# Patient Record
Sex: FEMALE | Race: WHITE | NOT HISPANIC OR LATINO | Employment: FULL TIME | URBAN - METROPOLITAN AREA
[De-identification: names, ages, dates, MRNs, and addresses within clinical notes are randomized per-mention and may not be internally consistent; named-entity substitution may affect disease eponyms.]

---

## 2018-09-25 ENCOUNTER — APPOINTMENT (EMERGENCY)
Dept: CT IMAGING | Facility: HOSPITAL | Age: 43
DRG: 439 | End: 2018-09-25
Payer: COMMERCIAL

## 2018-09-25 ENCOUNTER — HOSPITAL ENCOUNTER (INPATIENT)
Facility: HOSPITAL | Age: 43
LOS: 4 days | Discharge: HOME | DRG: 439 | End: 2018-09-29
Attending: EMERGENCY MEDICINE | Admitting: INTERNAL MEDICINE
Payer: COMMERCIAL

## 2018-09-25 DIAGNOSIS — R10.31 RLQ ABDOMINAL PAIN: ICD-10-CM

## 2018-09-25 DIAGNOSIS — K43.9 VENTRAL HERNIA: ICD-10-CM

## 2018-09-25 DIAGNOSIS — K80.20 CHOLELITHIASIS: ICD-10-CM

## 2018-09-25 DIAGNOSIS — K85.90 ACUTE PANCREATITIS: Primary | ICD-10-CM

## 2018-09-25 LAB
ALBUMIN SERPL BCP-MCNC: 3.4 G/DL (ref 3.5–5)
ALP SERPL-CCNC: 140 U/L (ref 46–116)
ALT SERPL W P-5'-P-CCNC: 71 U/L (ref 12–78)
ANION GAP SERPL CALCULATED.3IONS-SCNC: 9 MMOL/L (ref 4–13)
AST SERPL W P-5'-P-CCNC: 47 U/L (ref 5–45)
BASOPHILS # BLD AUTO: 0.06 THOUSANDS/ΜL (ref 0–0.1)
BASOPHILS NFR BLD AUTO: 1 % (ref 0–1)
BILIRUB SERPL-MCNC: 2.7 MG/DL (ref 0.2–1)
BUN SERPL-MCNC: 9 MG/DL (ref 5–25)
CALCIUM SERPL-MCNC: 11.2 MG/DL (ref 8.3–10.1)
CHLORIDE SERPL-SCNC: 95 MMOL/L (ref 100–108)
CLARITY, POC: CLEAR
CO2 SERPL-SCNC: 32 MMOL/L (ref 21–32)
COLOR, POC: NORMAL
CREAT SERPL-MCNC: 0.91 MG/DL (ref 0.6–1.3)
EOSINOPHIL # BLD AUTO: 0.04 THOUSAND/ΜL (ref 0–0.61)
EOSINOPHIL NFR BLD AUTO: 0 % (ref 0–6)
ERYTHROCYTE [DISTWIDTH] IN BLOOD BY AUTOMATED COUNT: 12.9 % (ref 11.6–15.1)
EXT BILIRUBIN, UA: NEGATIVE
EXT BLOOD URINE: NEGATIVE
EXT GLUCOSE, UA: NEGATIVE
EXT KETONES: NORMAL
EXT NITRITE, UA: NEGATIVE
EXT PH, UA: 7
EXT PREG TEST URINE: NEGATIVE
EXT PROTEIN, UA: 1
EXT SPECIFIC GRAVITY, UA: 1.01
EXT UROBILINOGEN: 1
GFR SERPL CREATININE-BSD FRML MDRD: 78 ML/MIN/1.73SQ M
GLUCOSE SERPL-MCNC: 117 MG/DL (ref 65–140)
HCT VFR BLD AUTO: 49.6 % (ref 34.8–46.1)
HGB BLD-MCNC: 16.9 G/DL (ref 11.5–15.4)
IMM GRANULOCYTES # BLD AUTO: 0.05 THOUSAND/UL (ref 0–0.2)
IMM GRANULOCYTES NFR BLD AUTO: 0 % (ref 0–2)
LIPASE SERPL-CCNC: 2856 U/L (ref 73–393)
LYMPHOCYTES # BLD AUTO: 1.18 THOUSANDS/ΜL (ref 0.6–4.47)
LYMPHOCYTES NFR BLD AUTO: 10 % (ref 14–44)
MCH RBC QN AUTO: 36 PG (ref 26.8–34.3)
MCHC RBC AUTO-ENTMCNC: 34.1 G/DL (ref 31.4–37.4)
MCV RBC AUTO: 106 FL (ref 82–98)
MONOCYTES # BLD AUTO: 0.74 THOUSAND/ΜL (ref 0.17–1.22)
MONOCYTES NFR BLD AUTO: 6 % (ref 4–12)
NEUTROPHILS # BLD AUTO: 10.35 THOUSANDS/ΜL (ref 1.85–7.62)
NEUTS SEG NFR BLD AUTO: 83 % (ref 43–75)
NRBC BLD AUTO-RTO: 0 /100 WBCS
PLATELET # BLD AUTO: 180 THOUSANDS/UL (ref 149–390)
PMV BLD AUTO: 10.3 FL (ref 8.9–12.7)
POTASSIUM SERPL-SCNC: 3.7 MMOL/L (ref 3.5–5.3)
PROT SERPL-MCNC: 8 G/DL (ref 6.4–8.2)
RBC # BLD AUTO: 4.7 MILLION/UL (ref 3.81–5.12)
SODIUM SERPL-SCNC: 136 MMOL/L (ref 136–145)
WBC # BLD AUTO: 12.42 THOUSAND/UL (ref 4.31–10.16)
WBC # BLD EST: NEGATIVE 10*3/UL

## 2018-09-25 PROCEDURE — 83690 ASSAY OF LIPASE: CPT | Performed by: PHYSICIAN ASSISTANT

## 2018-09-25 PROCEDURE — 80053 COMPREHEN METABOLIC PANEL: CPT | Performed by: PHYSICIAN ASSISTANT

## 2018-09-25 PROCEDURE — 96361 HYDRATE IV INFUSION ADD-ON: CPT

## 2018-09-25 PROCEDURE — 74176 CT ABD & PELVIS W/O CONTRAST: CPT

## 2018-09-25 PROCEDURE — 99285 EMERGENCY DEPT VISIT HI MDM: CPT

## 2018-09-25 PROCEDURE — 96376 TX/PRO/DX INJ SAME DRUG ADON: CPT

## 2018-09-25 PROCEDURE — 96374 THER/PROPH/DIAG INJ IV PUSH: CPT

## 2018-09-25 PROCEDURE — 81002 URINALYSIS NONAUTO W/O SCOPE: CPT | Performed by: PHYSICIAN ASSISTANT

## 2018-09-25 PROCEDURE — 85025 COMPLETE CBC W/AUTO DIFF WBC: CPT | Performed by: PHYSICIAN ASSISTANT

## 2018-09-25 PROCEDURE — 99223 1ST HOSP IP/OBS HIGH 75: CPT | Performed by: PHYSICIAN ASSISTANT

## 2018-09-25 PROCEDURE — 36415 COLL VENOUS BLD VENIPUNCTURE: CPT | Performed by: PHYSICIAN ASSISTANT

## 2018-09-25 PROCEDURE — 81025 URINE PREGNANCY TEST: CPT | Performed by: PHYSICIAN ASSISTANT

## 2018-09-25 RX ORDER — MORPHINE SULFATE 4 MG/ML
4 INJECTION, SOLUTION INTRAMUSCULAR; INTRAVENOUS ONCE
Status: COMPLETED | OUTPATIENT
Start: 2018-09-25 | End: 2018-09-25

## 2018-09-25 RX ORDER — MORPHINE SULFATE 2 MG/ML
2 INJECTION, SOLUTION INTRAMUSCULAR; INTRAVENOUS ONCE
Status: COMPLETED | OUTPATIENT
Start: 2018-09-25 | End: 2018-09-25

## 2018-09-25 RX ORDER — SODIUM CHLORIDE, SODIUM LACTATE, POTASSIUM CHLORIDE, CALCIUM CHLORIDE 600; 310; 30; 20 MG/100ML; MG/100ML; MG/100ML; MG/100ML
100 INJECTION, SOLUTION INTRAVENOUS CONTINUOUS
Status: DISCONTINUED | OUTPATIENT
Start: 2018-09-25 | End: 2018-09-25

## 2018-09-25 RX ORDER — SODIUM CHLORIDE, SODIUM LACTATE, POTASSIUM CHLORIDE, CALCIUM CHLORIDE 600; 310; 30; 20 MG/100ML; MG/100ML; MG/100ML; MG/100ML
100 INJECTION, SOLUTION INTRAVENOUS CONTINUOUS
Status: DISCONTINUED | OUTPATIENT
Start: 2018-09-26 | End: 2018-09-28

## 2018-09-25 RX ADMIN — SODIUM CHLORIDE, SODIUM LACTATE, POTASSIUM CHLORIDE, AND CALCIUM CHLORIDE 100 ML/HR: .6; .31; .03; .02 INJECTION, SOLUTION INTRAVENOUS at 23:37

## 2018-09-25 RX ADMIN — MORPHINE SULFATE 2 MG: 2 INJECTION, SOLUTION INTRAMUSCULAR; INTRAVENOUS at 22:22

## 2018-09-25 RX ADMIN — SODIUM CHLORIDE 1000 ML: 0.9 INJECTION, SOLUTION INTRAVENOUS at 19:07

## 2018-09-25 RX ADMIN — MORPHINE SULFATE 4 MG: 4 INJECTION, SOLUTION INTRAMUSCULAR; INTRAVENOUS at 20:29

## 2018-09-25 RX ADMIN — HYDROMORPHONE HYDROCHLORIDE 1 MG: 1 INJECTION, SOLUTION INTRAMUSCULAR; INTRAVENOUS; SUBCUTANEOUS at 23:12

## 2018-09-25 NOTE — ED PROVIDER NOTES
History  Chief Complaint   Patient presents with    Pelvic Pain     pt presents to ED c/o pelvic pain sincesunday  It started in her back and now is in her pelvic area  States it feels the same has when she had cysts rupture in the past  Rates pain 8/10     Zaira Oliveira is a 22-year-old F with history of ovarian cyst rupture in '11 and gastric bypass surgery in '02 presenting to ED with pelvic pain x2 days  Patient states that two nights ago she was awoken from sleep with sharp, stabbing lower abdominal pain  Patient states the pain has continued and worsened, describing the pain as 8/10  Patient states she feels nauseas during pain episodes, but denies vomiting  Patient states she had one episode of non-bloody diarrhea  She states she has decreased appetite and unable to tolerate po due to pain, but has been tolerating liquids  LMP was 2 weeks ago, however prior to that she had not bled in over 1 year  Patient drinks 2 drinks per week but denies tobacco or illicit drug use          History provided by:  Patient   used: No    Abdominal Pain   Pain location:  Suprapubic  Pain quality: aching, sharp and throbbing    Pain radiates to:  Suprapubic region  Pain severity:  Severe  Onset quality:  Sudden  Duration:  2 days  Timing:  Intermittent  Progression:  Worsening  Chronicity:  New  Context: awakening from sleep    Context: not alcohol use, not diet changes and not eating    Relieved by:  Nothing  Worsened by:  Nothing  Ineffective treatments:  None tried  Associated symptoms: chills, diarrhea (1 episode non-bloody) and nausea    Associated symptoms: no chest pain, no constipation, no cough, no dysuria, no hematemesis, no hematuria, no shortness of breath and no vomiting    Risk factors: no alcohol abuse, not elderly, not pregnant and no recent hospitalization        None       Past Medical History:   Diagnosis Date    Asthma        Past Surgical History:   Procedure Laterality Date    GASTRIC BYPASS         History reviewed  No pertinent family history  I have reviewed and agree with the history as documented  Social History   Substance Use Topics    Smoking status: Former Smoker    Smokeless tobacco: Never Used    Alcohol use Yes        Review of Systems   Constitutional: Positive for appetite change, chills and diaphoresis (night sweats with pain)  Respiratory: Negative for cough, chest tightness and shortness of breath  Cardiovascular: Negative for chest pain  Gastrointestinal: Positive for diarrhea (1 episode non-bloody) and nausea  Negative for constipation, hematemesis and vomiting  Genitourinary: Positive for pelvic pain  Negative for dysuria, flank pain and hematuria  Musculoskeletal: Negative for myalgias  Skin: Negative for pallor and rash  Neurological: Negative for dizziness, syncope, light-headedness and headaches  Psychiatric/Behavioral: Negative for confusion  All other systems reviewed and are negative  Physical Exam  Physical Exam   Constitutional: She appears well-developed and well-nourished  She is cooperative  She does not appear ill  No distress  HENT:   Right Ear: External ear normal    Left Ear: External ear normal    Nose: Nose normal    Mouth/Throat: Mucous membranes are normal    Eyes: Conjunctivae, EOM and lids are normal    Neck: Full passive range of motion without pain  Cardiovascular: Normal rate, regular rhythm, S1 normal, S2 normal and normal heart sounds  No murmur heard  Pulmonary/Chest: Effort normal and breath sounds normal  No respiratory distress  She has no wheezes  She has no rhonchi  She has no rales  Abdominal: Soft  Normal appearance and bowel sounds are normal  There is tenderness in the right upper quadrant and right lower quadrant  There is no rigidity, no rebound, no guarding and negative Cee's sign  Neurological: She is alert  She has normal strength  She is not disoriented   No cranial nerve deficit or sensory deficit  Skin: Skin is warm and dry  She is not diaphoretic  Nursing note and vitals reviewed  Vital Signs  ED Triage Vitals   Temperature Pulse Respirations Blood Pressure SpO2   09/25/18 1804 09/25/18 1741 09/25/18 1741 09/25/18 1900 09/25/18 1741   98 3 °F (36 8 °C) (!) 120 19 163/86 96 %      Temp Source Heart Rate Source Patient Position - Orthostatic VS BP Location FiO2 (%)   09/25/18 1804 09/25/18 1741 -- 09/25/18 1741 --   Temporal Monitor  Right arm       Pain Score       09/25/18 1741       8           Vitals:    09/25/18 1741 09/25/18 1900   BP:  163/86   Pulse: (!) 120 105       Visual Acuity      ED Medications  Medications   sodium chloride 0 9 % bolus 1,000 mL (1,000 mL Intravenous New Bag 9/25/18 1907)   morphine (PF) 4 mg/mL injection 4 mg (4 mg Intravenous Given 9/25/18 2029)       Diagnostic Studies  Results Reviewed     Procedure Component Value Units Date/Time    Lipase [[id number]]  (Abnormal) Collected:  09/25/18 1840    Lab Status:  Final result Specimen:  Blood from Arm, Left Updated:  09/25/18 1937     Lipase 2,856 (H) u/L     Comprehensive metabolic panel [[id number]]  (Abnormal) Collected:  09/25/18 1840    Lab Status:  Final result Specimen:  Blood from Arm, Left Updated:  09/25/18 1935     Sodium 136 mmol/L      Potassium 3 7 mmol/L      Chloride 95 (L) mmol/L      CO2 32 mmol/L      ANION GAP 9 mmol/L      BUN 9 mg/dL      Creatinine 0 91 mg/dL      Glucose 117 mg/dL      Calcium 11 2 (H) mg/dL      AST 47 (H) U/L      ALT 71 U/L      Alkaline Phosphatase 140 (H) U/L      Total Protein 8 0 g/dL      Albumin 3 4 (L) g/dL      Total Bilirubin 2 70 (H) mg/dL      eGFR 78 ml/min/1 73sq m     Narrative:         National Kidney Disease Education Program recommendations are as follows:  GFR calculation is accurate only with a steady state creatinine  Chronic Kidney disease less than 60 ml/min/1 73 sq  meters  Kidney failure less than 15 ml/min/1 73 sq  meters      CBC and differential [[id number]]  (Abnormal) Collected:  09/25/18 1840    Lab Status:  Final result Specimen:  Blood from Arm, Left Updated:  09/25/18 1855     WBC 12 42 (H) Thousand/uL      RBC 4 70 Million/uL      Hemoglobin 16 9 (H) g/dL      Hematocrit 49 6 (H) %       (H) fL      MCH 36 0 (H) pg      MCHC 34 1 g/dL      RDW 12 9 %      MPV 10 3 fL      Platelets 813 Thousands/uL      nRBC 0 /100 WBCs      Neutrophils Relative 83 (H) %      Immat GRANS % 0 %      Lymphocytes Relative 10 (L) %      Monocytes Relative 6 %      Eosinophils Relative 0 %      Basophils Relative 1 %      Neutrophils Absolute 10 35 (H) Thousands/µL      Immature Grans Absolute 0 05 Thousand/uL      Lymphocytes Absolute 1 18 Thousands/µL      Monocytes Absolute 0 74 Thousand/µL      Eosinophils Absolute 0 04 Thousand/µL      Basophils Absolute 0 06 Thousands/µL     POCT pregnancy, urine [[id number]]     Lab Status:  No result     POCT urinalysis dipstick [[id number]]     Lab Status:  No result Specimen:  Urine                  CT abdomen pelvis wo contrast    (Results Pending)              Procedures  Procedures       Phone Contacts  ED Phone Contact    ED Course  ED Course as of Sep 25 2146   Tue Sep 25, 2018   2034 Multiple unsuccessful attempts made to obtain IV access in [facility], will cancel IV contrast and order CT with oral contrast      2145 Care transferred to Dr Evelyn Avendaño  [facility]  CritCare Time    Disposition  Final diagnoses:   RLQ abdominal pain     Time reflects when diagnosis was documented in both MDM as applicable and the Disposition within this note     Time User Action Codes Description Comment    9/25/2018  9:04 PM Jeni Donis Add [R10 31] RLQ abdominal pain       ED Disposition     None      Follow-up Information    None         Patient's Medications    No medications on file     No discharge procedures on file      ED Provider  Electronically Signed by           Benton Youssef PA-C  09/25/18 0408

## 2018-09-26 ENCOUNTER — HOSPITAL ENCOUNTER (OUTPATIENT)
Dept: MRI IMAGING | Facility: HOSPITAL | Age: 43
Discharge: HOME | DRG: 439 | End: 2018-09-26
Payer: COMMERCIAL

## 2018-09-26 ENCOUNTER — APPOINTMENT (INPATIENT)
Dept: ULTRASOUND IMAGING | Facility: HOSPITAL | Age: 43
DRG: 439 | End: 2018-09-26
Payer: COMMERCIAL

## 2018-09-26 ENCOUNTER — APPOINTMENT (INPATIENT)
Dept: MRI IMAGING | Facility: HOSPITAL | Age: 43
DRG: 439 | End: 2018-09-26
Payer: COMMERCIAL

## 2018-09-26 PROBLEM — K43.9 VENTRAL HERNIA WITHOUT OBSTRUCTION OR GANGRENE: Status: ACTIVE | Noted: 2018-09-26

## 2018-09-26 LAB
ALBUMIN SERPL BCP-MCNC: 2.8 G/DL (ref 3.5–5)
ALP SERPL-CCNC: 121 U/L (ref 46–116)
ALT SERPL W P-5'-P-CCNC: 53 U/L (ref 12–78)
ANION GAP SERPL CALCULATED.3IONS-SCNC: 11 MMOL/L (ref 4–13)
AST SERPL W P-5'-P-CCNC: 55 U/L (ref 5–45)
BASOPHILS # BLD AUTO: 0.03 THOUSANDS/ΜL (ref 0–0.1)
BASOPHILS NFR BLD AUTO: 0 % (ref 0–1)
BILIRUB DIRECT SERPL-MCNC: 1.32 MG/DL (ref 0–0.2)
BILIRUB SERPL-MCNC: 2.6 MG/DL (ref 0.2–1)
BUN SERPL-MCNC: 10 MG/DL (ref 5–25)
CALCIUM SERPL-MCNC: 9.4 MG/DL (ref 8.3–10.1)
CHLORIDE SERPL-SCNC: 94 MMOL/L (ref 100–108)
CO2 SERPL-SCNC: 28 MMOL/L (ref 21–32)
CREAT SERPL-MCNC: 0.55 MG/DL (ref 0.6–1.3)
EOSINOPHIL # BLD AUTO: 0.05 THOUSAND/ΜL (ref 0–0.61)
EOSINOPHIL NFR BLD AUTO: 0 % (ref 0–6)
ERYTHROCYTE [DISTWIDTH] IN BLOOD BY AUTOMATED COUNT: 13.2 % (ref 11.6–15.1)
GFR SERPL CREATININE-BSD FRML MDRD: 115 ML/MIN/1.73SQ M
GLUCOSE SERPL-MCNC: 93 MG/DL (ref 65–140)
HCT VFR BLD AUTO: 44 % (ref 34.8–46.1)
HGB BLD-MCNC: 15 G/DL (ref 11.5–15.4)
IMM GRANULOCYTES # BLD AUTO: 0.08 THOUSAND/UL (ref 0–0.2)
IMM GRANULOCYTES NFR BLD AUTO: 1 % (ref 0–2)
LIPASE SERPL-CCNC: 1162 U/L (ref 73–393)
LYMPHOCYTES # BLD AUTO: 0.75 THOUSANDS/ΜL (ref 0.6–4.47)
LYMPHOCYTES NFR BLD AUTO: 7 % (ref 14–44)
MCH RBC QN AUTO: 36.6 PG (ref 26.8–34.3)
MCHC RBC AUTO-ENTMCNC: 34.1 G/DL (ref 31.4–37.4)
MCV RBC AUTO: 107 FL (ref 82–98)
MONOCYTES # BLD AUTO: 0.62 THOUSAND/ΜL (ref 0.17–1.22)
MONOCYTES NFR BLD AUTO: 5 % (ref 4–12)
NEUTROPHILS # BLD AUTO: 10 THOUSANDS/ΜL (ref 1.85–7.62)
NEUTS SEG NFR BLD AUTO: 87 % (ref 43–75)
NRBC BLD AUTO-RTO: 0 /100 WBCS
PLATELET # BLD AUTO: 163 THOUSANDS/UL (ref 149–390)
PMV BLD AUTO: 11.3 FL (ref 8.9–12.7)
POTASSIUM SERPL-SCNC: 4.7 MMOL/L (ref 3.5–5.3)
PROT SERPL-MCNC: 7.3 G/DL (ref 6.4–8.2)
RBC # BLD AUTO: 4.1 MILLION/UL (ref 3.81–5.12)
SODIUM SERPL-SCNC: 133 MMOL/L (ref 136–145)
WBC # BLD AUTO: 11.53 THOUSAND/UL (ref 4.31–10.16)

## 2018-09-26 PROCEDURE — 82248 BILIRUBIN DIRECT: CPT | Performed by: PHYSICIAN ASSISTANT

## 2018-09-26 PROCEDURE — 99252 IP/OBS CONSLTJ NEW/EST SF 35: CPT | Performed by: SURGERY

## 2018-09-26 PROCEDURE — 99233 SBSQ HOSP IP/OBS HIGH 50: CPT | Performed by: INTERNAL MEDICINE

## 2018-09-26 PROCEDURE — 80053 COMPREHEN METABOLIC PANEL: CPT | Performed by: PHYSICIAN ASSISTANT

## 2018-09-26 PROCEDURE — 85025 COMPLETE CBC W/AUTO DIFF WBC: CPT | Performed by: PHYSICIAN ASSISTANT

## 2018-09-26 PROCEDURE — 74181 MRI ABDOMEN W/O CONTRAST: CPT

## 2018-09-26 PROCEDURE — 83690 ASSAY OF LIPASE: CPT | Performed by: PHYSICIAN ASSISTANT

## 2018-09-26 PROCEDURE — 76705 ECHO EXAM OF ABDOMEN: CPT

## 2018-09-26 RX ORDER — ONDANSETRON 2 MG/ML
4 INJECTION INTRAMUSCULAR; INTRAVENOUS EVERY 6 HOURS PRN
Status: DISCONTINUED | OUTPATIENT
Start: 2018-09-26 | End: 2018-09-29 | Stop reason: HOSPADM

## 2018-09-26 RX ADMIN — HYDROMORPHONE HYDROCHLORIDE 1 MG: 1 INJECTION, SOLUTION INTRAMUSCULAR; INTRAVENOUS; SUBCUTANEOUS at 12:13

## 2018-09-26 RX ADMIN — HYDROMORPHONE HYDROCHLORIDE 1 MG: 1 INJECTION, SOLUTION INTRAMUSCULAR; INTRAVENOUS; SUBCUTANEOUS at 20:10

## 2018-09-26 RX ADMIN — ONDANSETRON 4 MG: 2 INJECTION, SOLUTION INTRAMUSCULAR; INTRAVENOUS at 15:58

## 2018-09-26 RX ADMIN — HYDROMORPHONE HYDROCHLORIDE 1 MG: 1 INJECTION, SOLUTION INTRAMUSCULAR; INTRAVENOUS; SUBCUTANEOUS at 06:17

## 2018-09-26 RX ADMIN — HYDROMORPHONE HYDROCHLORIDE 1 MG: 1 INJECTION, SOLUTION INTRAMUSCULAR; INTRAVENOUS; SUBCUTANEOUS at 15:59

## 2018-09-26 RX ADMIN — HYDROMORPHONE HYDROCHLORIDE 1 MG: 1 INJECTION, SOLUTION INTRAMUSCULAR; INTRAVENOUS; SUBCUTANEOUS at 02:17

## 2018-09-26 RX ADMIN — SODIUM CHLORIDE, SODIUM LACTATE, POTASSIUM CHLORIDE, AND CALCIUM CHLORIDE 200 ML/HR: .6; .31; .03; .02 INJECTION, SOLUTION INTRAVENOUS at 20:41

## 2018-09-26 RX ADMIN — SODIUM CHLORIDE, SODIUM LACTATE, POTASSIUM CHLORIDE, AND CALCIUM CHLORIDE 200 ML/HR: .6; .31; .03; .02 INJECTION, SOLUTION INTRAVENOUS at 00:03

## 2018-09-26 RX ADMIN — ONDANSETRON 4 MG: 2 INJECTION, SOLUTION INTRAMUSCULAR; INTRAVENOUS at 20:46

## 2018-09-26 RX ADMIN — SODIUM CHLORIDE, SODIUM LACTATE, POTASSIUM CHLORIDE, AND CALCIUM CHLORIDE 200 ML/HR: .6; .31; .03; .02 INJECTION, SOLUTION INTRAVENOUS at 12:15

## 2018-09-26 RX ADMIN — ENOXAPARIN SODIUM 40 MG: 40 INJECTION SUBCUTANEOUS at 08:19

## 2018-09-26 NOTE — PROGRESS NOTES
Progress Note - Lopez Bauer 37 y o  female MRN: [id number]    Unit/Bed#: [address] [place] [address] [id number] Encounter: [id number]      Assessment:    Principal Problem:    Acute pancreatitis  Active Problems:    Cholelithiasis    Ventral hernia without obstruction or gangrene  Resolved Problems:    * No resolved hospital problems  *      Plan:  Continue IV fluids will to allow some sips of water  Patient is for MRCP  Need to rule out common duct stone  Continue DVT prophylaxis  Surgical consult for ventral hernia    Subjective:   Patient has less abdominal pain  This began over the last few days  No alcohol intake  Objective:     Vitals: Blood pressure 132/96, pulse 92, temperature 98 5 °F (36 9 °C), temperature source Oral, resp  rate 18, height 5' 5" (1 651 m), weight 85 9 kg (189 lb 6 oz), last menstrual period 09/25/2018, SpO2 96 %  ,Body mass index is 31 51 kg/m²  Intake/Output Summary (Last 24 hours) at 09/26/18 0845  Last data filed at 09/26/18 0816   Gross per 24 hour   Intake             1845 ml   Output                0 ml   Net             1845 ml       Physical Exam:    Alert and awake in no acute distress  Lungs clear to auscultation bilaterally  Heart regular rate and rythm, normal heart sounds  Abdomen soft, active bowel sounds, slight periumbilical tenderness ventral hernia present  Extremities: no cyanosis, clubbing or edema        Invasive Devices     Peripheral Intravenous Line            Peripheral IV 09/25/18 Left Hand less than 1 day                            Lab, Imaging and other studies: I have personally reviewed pertinent reports         Results from last 7 days  Lab Units 09/26/18  0557 09/25/18  1840   WBC Thousand/uL 11 53* 12 42*   HEMOGLOBIN g/dL 15 0 16 9*   HEMATOCRIT % 44 0 49 6*   PLATELETS Thousands/uL 163 180   NEUTROS PCT % 87* 83*   LYMPHS PCT % 7* 10*   MONOS PCT % 5 6   EOS PCT % 0 0       Results from last 7 days  Lab Units 09/26/18  0557 09/25/18  1840   SODIUM mmol/L 133* 136   POTASSIUM mmol/L 4 7 3 7   CHLORIDE mmol/L 94* 95*   CO2 mmol/L 28 32   BUN mg/dL 10 9   CREATININE mg/dL 0 55* 0 91   CALCIUM mg/dL 9 4 11 2*   ALK PHOS U/L 121* 140*   ALT U/L 53 71   AST U/L 55* 47*     No results found for: TROPONINI, CKMB, CKTOTAL      No results found for: Kamilla Marley, SPUTUMCULTUR    Imaging:  No results found for this or any previous visit  No results found for this or any previous visit  PATIENT CENTERED ROUNDS: I have performed rounds with the nursing staff  This note has been constructed using a voice recognition system      Champ Castro MD

## 2018-09-26 NOTE — CASE MANAGEMENT
Initial Clinical Review  Admission: Date/Time/Statement: 9/25/18 @ 2305   Orders Placed This Encounter   Procedures    Inpatient Admission (expected length of stay for this patient is greater than two midnights)     Standing Status:   Standing     Number of Occurrences:   1     Order Specific Question:   Admitting Physician     Answer:   Jada Cruz [[id number]]     Order Specific Question:   Level of Care     Answer:   Med Surg [[id number]]     Order Specific Question:   Estimated length of stay     Answer:   More than 2 Midnights     Order Specific Question:   Certification     Answer:   I certify that inpatient services are medically necessary for this patient for a duration of greater than two midnights  See H&P and MD Progress Notes for additional information about the patient's course of treatment  ED: Date/Time/Mode of Arrival:   ED Arrival Information     Expected Arrival Acuity Means of Arrival Escorted By Service Admission Type    - 9/25/2018 17:32 Urgent Walk-In Family Member General Medicine Urgent    Arrival Complaint    ABDOMINAL PAIN      Chief Complaint:   Chief Complaint   Patient presents with    Pelvic Pain     pt presents to ED c/o pelvic pain sincesunday  It started in her back and now is in her pelvic area  States it feels the same has when she had cysts rupture in the past  Rates pain 8/10   History of Illness:   41-year-old F with history of ovarian cyst rupture in '11 and gastric bypass surgery in '02 presenting to ED with pelvic pain x2 days  Patient states that two nights ago she was awoken from sleep with sharp, stabbing lower abdominal pain  Patient states the pain has continued and worsened, describing the pain as 8/10  Patient states she feels nauseas during pain episodes, but denies vomiting  Patient states she had one episode of non-bloody diarrhea  She states she has decreased appetite and unable to tolerate po due to pain, but has been tolerating liquids   LMP was 2 weeks ago, however prior to that she had not bled in over 1 year  Patient drinks 2 drinks per week but denies tobacco or illicit drug use  ED Vital Signs:   ED Triage Vitals   Temperature Pulse Respirations Blood Pressure SpO2   09/25/18 1804 09/25/18 1741 09/25/18 1741 09/25/18 1900 09/25/18 1741   98 3 °F (36 8 °C) (!) 120 19 163/86 96 %      Temp Source Heart Rate Source Patient Position - Orthostatic VS BP Location FiO2 (%)   09/25/18 1804 09/25/18 1741 09/25/18 2359 09/25/18 1741 --   Temporal Monitor Lying Right arm       Pain Score       09/25/18 1741       8        Wt Readings from Last 1 Encounters:   09/26/18 85 9 kg (189 lb 6 oz)   Vital Signs (abnormal):   , 103  /92, 139/101, 172/84  Abnormal Labs/Diagnostic Test Results:   WBC 12 42 HGB 16 9 CL 95 CA 11 2 AST 47 ALK PHOS 140 ALB 3 4 TBILI 2 70 LIPASE 2856  CT A/P=1  Findings consistent with acute pancreatitis  No evidence of free intraperitoneal air or collection  2   Cholelithiasis without evidence of cholecystitis or biliary obstruction  3   Steatosis and hepatomegaly  4   Midline ventral abdominal hernia containing omental fat measuring 9 cm  No bowel obstruction, colitis or diverticulitis    Normal appendix  ED Treatment:   Medication Administration from 09/25/2018 1732 to 09/25/2018 2345       Date/Time Order Dose Route Action Action by Comments     09/25/2018 2337 sodium chloride 0 9 % bolus 1,000 mL 0 mL Intravenous Stopped Cody Hopper RN      09/25/2018 1907 sodium chloride 0 9 % bolus 1,000 mL 1,000 mL Intravenous Artemioet 37 Yoly Urrutia RN      09/25/2018 2029 morphine (PF) 4 mg/mL injection 4 mg 4 mg Intravenous Given Yoly Urrutia RN      09/25/2018 2222 morphine injection 2 mg 2 mg Intravenous Given Yoly Urrutia RN      09/25/2018 2337 lactated ringers infusion 100 mL/hr Intravenous New [address] Cody Hopper RN      09/25/2018 2312 HYDROmorphone (DILAUDID) injection 1 mg 1 mg Intravenous Given Cody Hopper RN       Past Medical/Surgical History: Active Ambulatory Problems     Diagnosis Date Noted    No Active Ambulatory Problems     Resolved Ambulatory Problems     Diagnosis Date Noted    No Resolved Ambulatory Problems     Past Medical History:   Diagnosis Date    Asthma    Admitting Diagnosis: Acute pancreatitis [K85 90]  Cholelithiasis [K80 20]  Abdominal pain [R10 9]  Ventral hernia [K43 9]  RLQ abdominal pain [R10 31]  Age/Sex: 37 y o  female  Assessment/Plan:   38 YO FEMALE TO ER FROM HOME C/O ABD PAIN WITH ASSOCIATED NAUSEA, LACK OF APPETITE  ADMISSION LABS FOUND LIPASE ELEVATED, CT +ACUTE PANCREATITIS  ADMITTED TO INPATIENT STATUS, NPO, STARTED ON IVF WITH GI & SURGERY CONSULTED  Admission Orders:  MED SIRG  NPO  CONSULT GI  CONSULT SURGERY  VENODYNES  Scheduled Meds:   Current Facility-Administered Medications:  enoxaparin 40 mg Subcutaneous Daily DEZ Rowan-GAB    HYDROmorphone 1 mg Intravenous Q3H PRN DEZ Rowan-GAB    iohexol 50 mL Oral Once in imaging Kamilla Garg MD    ondansetron 4 mg Intravenous Q6H PRN Mera Thomas PA-C    Continuous Infusions:   lactated ringers 200 mL/hr Last Rate: 200 mL/hr (09/26/18 0003)   PRN Meds: HYDROmorphone    iohexol    ondansetron  Thank you,  [address] [place] [address] Utilization Review Department  Phone: [phone]; Fax [phone]  ATTENTION: Please call with any questions or concerns to [phone]  and carefully follow the prompts so that you are directed to the right person  Send all requests for admission clinical reviews, approved or denied determinations and any other requests to fax [phone]   All voicemails are confidential

## 2018-09-26 NOTE — SOCIAL WORK
Met with Pt  Pt's  at bedside  Pt presents AA&Ox3  Discussed role of   Pt lives with  in [facility], no jo-ann  Pt is independent with adls and ambulation  No DME  Pt goes to [facility] [employer] in [facility]  Pt and Pt's  drives and goes grocery shopping  No needs anticipated  Will follow

## 2018-09-26 NOTE — CONSULTS
Consultation - General Surgery   Epi Alfaro 37 y o  female MRN: [id number]  Unit/Bed#: [address] [place] [address] 203-01 Encounter: [id number]    Assessment/Plan     Acute pancreatitis secondary to gallstones versus ETOH  -lipase trending down since admission  -continue bowel rest, pain control, IV fluids    Elevated bilirubin and mildly elevated transaminases POA  -repeat labs unchanged this morning  -patient will need MRCP  Apparently patient was unable to fit and our scanner  She will be transferred to [facility] for MRCP later today  Await results  -if direct bilirubin is not elevated and MRCP without choledocholithiasis or ductal dilatation then episode of pancreatitis likely related to ETOH  If MRCP is positive for choledocholithiasis patient will need ERCP  This will likely need to be done in the operating room due to history of gastric bypass  If duct appears dilated then patient likely passed a gallstone and will consider cholecystectomy at some point  -for now continue monitoring pain, pancreatic enzymes, bilirubin, LFTs  Await MRCP results  We will follow along  Morbid obesity status post gastric bypass 2002  -patient with initial weight loss of 200 lb but has gained most of her weight back   - patient should follow up with bariatrics after discharge for weight management program versus surgical revision    Alcohol abuse- patient admits to history of heavy alcohol use on weekends with intermittent drinking during the week  -monitor for withdrawal symptoms        History of Present Illness     HPI:  Epi Alfaro is a 37 y o  female with a history of gastric bypass surgery who presents with abdominal pain x 3 days  It began as diffuse back pain on Sunday which progressed to right sided abdominal pain on Monday  Pain progressed causing patient to leave work yesterday and present to the ED  Patient stated sitting up makes the pain a 10/10  She has not had an appetite or bowel movement since Sunday   Patient experiences nausea with the intermittent abdominal pain  She describes it as a strong cramping sensation that originally felt like the same pain she had when she had an ovarian cyst in the past  She does not have any history of pancreatic or gallbladder disease  She denies prior history of right-sided pain  She denies postprandial pain or pain after eating fatty meals  She drinks heavily on the weekends and reports drinking intermittently throughout the week as well  She reports having experienced chills Sunday through Tuesday but now reports being warm and sweaty, which she stated is her baseline  She has had no known fevers  She was found to have acute pancreatitis on CT scan on admission  She did have elevated bilirubin and mildly elevated transaminases on admission  We are consulted for opinion related to possible gallstone pancreatitis and need for cholecystectomy      Inpatient consult to Acute Care Surgery  Consult performed by: TIFFANY WOOD  Consult ordered by: Jake Almaguer          Review of Systems     Historical Information   Past Medical History:   Diagnosis Date    Asthma      Past Surgical History:   Procedure Laterality Date    GASTRIC BYPASS       Social History   History   Alcohol Use    Yes     History   Drug Use No     History   Smoking Status    Former Smoker   Smokeless Tobacco    Never Used     Family History: non-contributory    Meds/Allergies   current meds:   Current Facility-Administered Medications   Medication Dose Route Frequency    enoxaparin (LOVENOX) subcutaneous injection 40 mg  40 mg Subcutaneous Daily    HYDROmorphone (DILAUDID) injection 1 mg  1 mg Intravenous Q3H PRN    iohexol (OMNIPAQUE) 240 MG/ML solution 50 mL  50 mL Oral Once in imaging    lactated ringers infusion  200 mL/hr Intravenous Continuous    ondansetron (ZOFRAN) injection 4 mg  4 mg Intravenous Q6H PRN    and PTA meds:   None     Allergies   Allergen Reactions    Augmentin [Amoxicillin-Pot Clavulanate]     Ciprofloxacin     Penicillins        Objective   First Vitals:   Blood Pressure: 163/86 (09/25/18 1900)  Pulse: (!) 120 (09/25/18 1741)  Temperature: 98 3 °F (36 8 °C) (09/25/18 1804)  Temp Source: Temporal (09/25/18 1804)  Respirations: 19 (09/25/18 1741)  Height: 5' 5" (165 1 cm) (09/25/18 1741)  Weight - Scale: (!) 145 kg (320 lb) (09/25/18 1741)  SpO2: 96 % (09/25/18 1741)    Current Vitals:   Blood Pressure: 132/96 (09/26/18 0700)  Pulse: 92 (09/26/18 0700)  Temperature: 98 5 °F (36 9 °C) (09/26/18 0700)  Temp Source: Oral (09/26/18 0700)  Respirations: 18 (09/26/18 0700)  Height: 5' 5" (165 1 cm) (09/25/18 2359)  Weight - Scale: 85 9 kg (189 lb 6 oz) (09/26/18 0700)  SpO2: 96 % (09/26/18 0700)      Intake/Output Summary (Last 24 hours) at 09/26/18 1137  Last data filed at 09/26/18 0816   Gross per 24 hour   Intake             1845 ml   Output                0 ml   Net             1845 ml       Invasive Devices     Peripheral Intravenous Line            Peripheral IV 09/25/18 Left Hand less than 1 day                Physical Exam   General:   morbidly obese woman  no acute distress, non-toxic appearance   Eyes:   conjunctiva normal, anicteric  EOM's in tact  Neck: supple  trachea midline  No JVD  Respiratory:  Lungs clear to auscultation  No respiratory distress  Cardiovascular:  Regular rate and rhythm  No murmur  GI:   obese, soft, nondistended, normal bowel sounds, mild tenderness in right upper and lower quadrants, no guarding or rebound, positive midline incisional hernia without tenderness  Skin:  warm and dry  Large vertical scar over central abdomen  No jaundice  Neurologic:  Alert & oriented x 3   No focal deficits  Psychiatric:  Speech and behavior appropriate     Lab Results:   CBC:   Lab Results   Component Value Date    WBC 11 53 (H) 09/26/2018    HGB 15 0 09/26/2018    HCT 44 0 09/26/2018     (H) 09/26/2018     09/26/2018    MCH 36 6 (H) 09/26/2018    MCHC 34 1 09/26/2018    RDW 13 2 09/26/2018    MPV 11 3 09/26/2018    NRBC 0 09/26/2018   , CMP:   Lab Results   Component Value Date     (L) 09/26/2018    K 4 7 09/26/2018    CL 94 (L) 09/26/2018    CO2 28 09/26/2018    BUN 10 09/26/2018    CREATININE 0 55 (L) 09/26/2018    CALCIUM 9 4 09/26/2018    AST 55 (H) 09/26/2018    ALT 53 09/26/2018    ALKPHOS 121 (H) 09/26/2018    EGFR 115 09/26/2018   , Amylase: No results found for: AMYLASE, Lipase:   Lab Results   Component Value Date    LIPASE 1,162 (H) 09/26/2018     Imaging: I have personally reviewed pertinent reports  EKG, Pathology, and Other Studies: I have personally reviewed pertinent reports        Nahomi Chavez PA-C

## 2018-09-26 NOTE — ASSESSMENT & PLAN NOTE
· CT:  Cholelithiasis without evidence of cholecystitis or biliary obstruction  · GI consult  ·  mL/hour  · Dilaudid 1 mg IV q 4h p r n   Breakthrough pain  · U/S gallbladder in AM

## 2018-09-26 NOTE — CONSULTS
Rosita Leyva  [id number]    37 y o   female      ASSESSMENT  1  Gallstone pancreatitis, acute  Uncomplicated  This represents her 1st episode  Exclude choledocholithiasis as LFTs mildly elevated, mixed injury liver pattern  PLAN  1  Agree with aggressive IV fluid  2  IV analgesia  3  Bowel rest  4  MRCP to be performed today  ERCP indicated if choledocholithiasis noted on MRCP  If MRCP negative for common bile duct stone proceed with laparoscopic cholecystectomy when pancreatitis improves  5  Consult surgery    Chief Complaint   Patient presents with    Pelvic Pain     pt presents to ED c/o pelvic pain sincesunday  It started in her back and now is in her pelvic area  States it feels the same has when she had cysts rupture in the past  Rates pain 8/10       HPI  55-year-old female with obesity status post Adilson-en-Y gastric bypass in 2002, and a history of ovarian cysts that have ruptured in the past, presents with abdominal pain  The pain actually began in her lower back and awoke her from sleep early Monday morning  It then seem to radiate to her lower abdomen and the pain was reminiscent of a ruptured ovarian cyst she had a while back when she presented to [facility]   The pain then radiated to her upper abdomen and now remains in the right side of her abdomen  Associated with nausea, sweats and chills  Denies that food was actually precipitating but has been experiencing lack of appetite due to the pain  Urine darker  She has not had a bowel movement for few days  Denies heavy alcohol use or tobacco tobacco   Denies any chest pain, shortness of breath, melena or rectal bleeding  She did lose approximately 200 lb since undergoing a Adilson-en-Y gastric bypass surgery in 2002 but then regained almost all the weight back except about 50 lbs  She thinks she may have lost 10 lbs over the past few months      Past Medical History:   Diagnosis Date    Asthma        Past Surgical History: Procedure Laterality Date    GASTRIC BYPASS         No prescriptions prior to admission  Allergies   Allergen Reactions    Augmentin [Amoxicillin-Pot Clavulanate]     Ciprofloxacin     Penicillins        Social History   Substance Use Topics    Smoking status: Former Smoker    Smokeless tobacco: Never Used    Alcohol use Yes       History reviewed  No pertinent family history      Review of Systems  General ROS: +weight loss, fever, night sweats  Psychological ROS: negative for depression or anxiety  Ophthalmic ROS: negative for any eye issues  ENT ROS: no scleral icterus  Hematological and Lymphatic: no issues with bleeding or adenopathy  Respiratory ROS: no chest pain, shortness of breath or cough  Cardiovascular ROS: no issues with chest pain, heart palpitations  Gastrointestinal ROS: +nausea, vomiting, diarrhea +abdominal pain, positive constipation  Genito-Urinary ROS: no issues with urinary burning, urinary frequency or hematuria  Neurological ROS:  no asterixis  Dermatological ROS: no skin rashes or lesions        /96 (BP Location: Right arm)   Pulse 92   Temp 98 5 °F (36 9 °C) (Oral)   Resp 18   Ht 5' 5" (1 651 m)   Wt 85 9 kg (189 lb 6 oz)   LMP 09/25/2018   SpO2 96%   BMI 31 51 kg/m²       Physical Exam     Constitutional:  Well developed, well nourished, no acute distress, non-toxic appearance   Eyes:   conjunctiva normal   HENT:  Atraumatic  Respiratory:  No respiratory distress  Cardiovascular:  Normal rate  GI:  Soft, nondistended, normal bowel sounds, mild tenderness in right upper quadrant and right lower quadrant, positive ventral hernia, obese  Musculoskeletal:  Back- mild right CVA  tenderness  Integument:  Well hydrated, multiple tattoos  Neurologic:  Alert & oriented x 3   Psychiatric:  Speech and behavior appropriate               Lab Results   Component Value Date    CALCIUM 9 4 09/26/2018     (L) 09/26/2018    K 4 7 09/26/2018    CO2 28 09/26/2018    CL 94 (L) 09/26/2018    BUN 10 09/26/2018    CREATININE 0 55 (L) 09/26/2018     Lab Results   Component Value Date    WBC 11 53 (H) 09/26/2018    HGB 15 0 09/26/2018    HCT 44 0 09/26/2018     (H) 09/26/2018     09/26/2018     Lab Results   Component Value Date    ALT 53 09/26/2018    AST 55 (H) 09/26/2018    ALKPHOS 121 (H) 09/26/2018     No results found for: AMYLASE  Lab Results   Component Value Date    LIPASE 1,162 (H) 09/26/2018     No results found for: IRON, TIBC, FERRITIN  No results found for: INR        Ct Abdomen Pelvis Wo Contrast    Result Date: 9/25/2018  Narrative: CT ABDOMEN AND PELVIS WITHOUT IV CONTRAST INDICATION:   Right lower quadrant pain  Elevated lipase  COMPARISON:  None  TECHNIQUE:  CT examination of the abdomen and pelvis was performed without intravenous contrast   Axial, sagittal, and coronal 2D reformatted images were created from the source data and submitted for interpretation  Radiation dose length product (DLP) for this visit:  2882  45 mGy-cm   This examination, like all CT scans performed in the [facility], was performed utilizing techniques to minimize radiation dose exposure, including the use of iterative reconstruction and automated exposure control  Enteric contrast was administered  FINDINGS: ABDOMEN LOWER CHEST:  Minimal subsegmental atelectasis at the right lung base  LIVER/BILIARY TREE:  Decreased attenuation in the liver suggesting steatosis  Hepatomegaly  No biliary obstruction  GALLBLADDER:  Punctate gallstones in the dependent portion of the gallbladder fundus  No evidence of acute cholecystitis  SPLEEN:  Unremarkable  PANCREAS: Fast stranding surrounding the head and uncinate process of the pancreas  No pancreatic duct dilatation ADRENAL GLANDS:  Unremarkable  KIDNEYS/URETERS:  No nephrolithiasis or obstructive uropathy  STOMACH AND BOWEL:  Midline ventral abdominal hernia containing omental fat measuring 5 4 x 9 x 8 3 cm    No evidence of fat necrosis  Postsurgical change from gastric bypass  No evidence of bowel obstruction, colitis or diverticulitis  No evidence of gastritis or enteritis APPENDIX:  A normal appendix was visualized  ABDOMINOPELVIC CAVITY:  Minimal fat stranding in the dependent portion of the midabdomen  No fluid or collection  No free intraperitoneal air  No lymphadenopathy  VESSELS:  No abdominal aortic aneurysm  PELVIS REPRODUCTIVE ORGANS:  No adnexal mass or pathologic cyst  URINARY BLADDER:  Unremarkable  ABDOMINAL WALL/INGUINAL REGIONS:  Unremarkable  OSSEOUS STRUCTURES:  No acute fracture or destructive osseous lesion  Impression: 1  Findings consistent with acute pancreatitis  No evidence of free intraperitoneal air or collection  2   Cholelithiasis without evidence of cholecystitis or biliary obstruction  3   Steatosis and hepatomegaly  4   Midline ventral abdominal hernia containing omental fat measuring 9 cm  No bowel obstruction, colitis or diverticulitis  Normal appendix  The study was marked in EPIC for significant notification  Workstation performed: [id number]     Counseling / Coordination of Care  Total floor / unit time spent today 25 minutes  Greater than 50% of total time was spent with the patient and / or family counseling and / or coordination of care  A description of the counseling / coordination of care: 15      Precious [place]

## 2018-09-26 NOTE — H&P
[address] Internal Medicine    H&P- Rosita Leyva 1975, 37 y o  female MRN: [id number]    Unit/Bed#: [address] [place] [address] Encounter: [id number]    Primary Care Provider: Flora Delaney MD   Date and time admitted to hospital: 9/25/2018  5:36 PM        * Acute pancreatitis   Assessment & Plan    · CT: Findings consistent with acute pancreatitis  No evidence of free intraperitoneal air or collection  · GI consult  · NPO  · Dilaudid 1 mg IV q 4h p r n  Breakthrough pain  ·  mL/hour  · Lipase in AM        Cholelithiasis   Assessment & Plan    · CT:  Cholelithiasis without evidence of cholecystitis or biliary obstruction  · GI consult  ·  mL/hour  · Dilaudid 1 mg IV q 4h p r n  Breakthrough pain  · U/S gallbladder in AM          VTE Prophylaxis: Enoxaparin (Lovenox)  / sequential compression device   Code Status: Full  POLST: There is no POLST form on file for this patient (pre-hospital)  Discussion with family: Family at bedside    Anticipated Length of Stay:  Patient will be admitted on an Inpatient basis with an anticipated length of stay of  Greater than 2 midnights  Justification for Hospital Stay:  Acute pancreatitis    Chief Complaint:   Pelvic pain    History of Present Illness:    Rosita Leyva is a 37 y o  female with a PMH significant for gastric bypass who presents with pelvic pain times 2 days  Patient states that pain began about 2 days ago when a sharp pain woke her up from her sleep  Patient states the pain was located in the lower abdomen at that time  She says that over the past 2 days the pain has worsened rated 8/10  She also complains of nausea during these episodes  Patient also complains of lack of appetite due to the pain  Patient denies heavy alcohol use  CT in the ED showed acute pancreatitis as well as cholelithiasis  She denies chest pain, palpitations, shortness of breath, trouble breathing, numbness or tingling hands or feet      Review of Systems:    Review of Systems   Constitutional: Positive for appetite change  Negative for chills, diaphoresis, fatigue and fever  Eyes: Negative for photophobia and visual disturbance  Respiratory: Negative for choking, shortness of breath and wheezing  Cardiovascular: Negative for chest pain, palpitations and leg swelling  Gastrointestinal: Positive for abdominal pain and nausea  Negative for abdominal distention, constipation, diarrhea and vomiting  Endocrine: Negative for polydipsia, polyphagia and polyuria  Genitourinary: Negative for dysuria, frequency, hematuria and urgency  Musculoskeletal: Positive for back pain  Negative for neck pain and neck stiffness  Skin: Negative for pallor, rash and wound  Neurological: Negative for dizziness, tremors, syncope, weakness, light-headedness, numbness and headaches  All other systems reviewed and are negative  Past Medical and Surgical History:     Past Medical History:   Diagnosis Date    Asthma        Past Surgical History:   Procedure Laterality Date    GASTRIC BYPASS         Meds/Allergies:    Prior to Admission medications    Not on File     I have reviewed home medications with patient personally  Allergies: Allergies   Allergen Reactions    Augmentin [Amoxicillin-Pot Clavulanate]     Ciprofloxacin     Penicillins        Social History:     Marital Status: /Civil Union   Occupation:  Noncontributory  Patient Pre-hospital Living Situation:  Family  Patient Pre-hospital Level of Mobility:  Full  Patient Pre-hospital Diet Restrictions:  None  Substance Use History:   History   Alcohol Use    Yes     History   Smoking Status    Former Smoker   Smokeless Tobacco    Never Used     History   Drug Use No       Family History:    History reviewed  No pertinent family history      Physical Exam:     Vitals:   Blood Pressure: (!) 172/84 (09/25/18 2359)  Pulse: 103 (09/25/18 2359)  Temperature: 98 4 °F (36 9 °C) (09/25/18 2359)  Temp Source: Oral (09/25/18 2359)  Respirations: 20 (09/25/18 2359)  Height: 5' 5" (165 1 cm) (09/25/18 2359)  Weight - Scale: 85 7 kg (188 lb 15 oz) (09/25/18 2359)  SpO2: 91 % (09/25/18 2359)    Physical Exam   Constitutional: She is oriented to person, place, and time  Vital signs are normal  She appears well-developed and well-nourished  Non-toxic appearance  No distress  HENT:   Head: Normocephalic and atraumatic  Mouth/Throat: Oropharynx is clear and moist and mucous membranes are normal  Normal dentition  No oropharyngeal exudate  Eyes: Conjunctivae are normal  Pupils are equal, round, and reactive to light  Right eye exhibits no discharge  Left eye exhibits no discharge  No scleral icterus  Neck: No JVD present  No tracheal deviation and no erythema present  Cardiovascular: Normal rate, regular rhythm, normal heart sounds, intact distal pulses and normal pulses  Exam reveals no gallop and no friction rub  No murmur heard  Pulmonary/Chest: Effort normal and breath sounds normal  No accessory muscle usage or stridor  No respiratory distress  She has no decreased breath sounds  She has no wheezes  She has no rales  Abdominal: Soft  Bowel sounds are normal  She exhibits no distension and no mass  There is no hepatosplenomegaly  There is generalized tenderness  There is no rigidity, no rebound, no guarding, no CVA tenderness, no tenderness at McBurney's point and negative Cee's sign  Musculoskeletal: She exhibits no edema, tenderness or deformity  Neurological: She is alert and oriented to person, place, and time  GCS eye subscore is 4  GCS verbal subscore is 5  GCS motor subscore is 6  Skin: Skin is warm and dry  No rash noted  She is not diaphoretic  No erythema  No pallor  Psychiatric: She has a normal mood and affect  Her behavior is normal    Nursing note and vitals reviewed  Additional Data:     Lab Results: I have personally reviewed pertinent reports          Results from last 7 days  Lab Units 09/25/18  1840   WBC Thousand/uL 12 42*   HEMOGLOBIN g/dL 16 9*   HEMATOCRIT % 49 6*   PLATELETS Thousands/uL 180   NEUTROS PCT % 83*   LYMPHS PCT % 10*   MONOS PCT % 6   EOS PCT % 0       Results from last 7 days  Lab Units 09/25/18  1840   SODIUM mmol/L 136   POTASSIUM mmol/L 3 7   CHLORIDE mmol/L 95*   CO2 mmol/L 32   BUN mg/dL 9   CREATININE mg/dL 0 91   CALCIUM mg/dL 11 2*   ALK PHOS U/L 140*   ALT U/L 71   AST U/L 47*                   Imaging: I have personally reviewed pertinent reports  CT abdomen pelvis wo contrast   Final Result by Precious Thomson MD (09/25 2255)         1  Findings consistent with acute pancreatitis  No evidence of free intraperitoneal air or collection  2   Cholelithiasis without evidence of cholecystitis or biliary obstruction  3   Steatosis and hepatomegaly  4   Midline ventral abdominal hernia containing omental fat measuring 9 cm  No bowel obstruction, colitis or diverticulitis  Normal appendix  The study was marked in EPIC for significant notification  Workstation performed: [id number]         US gallbladder    (Results Pending)       EKG, Pathology, and Other Studies Reviewed on Admission: None    Allscripts / Epic Records Reviewed: Yes     ** Please Note: This note has been constructed using a voice recognition system   **

## 2018-09-26 NOTE — ASSESSMENT & PLAN NOTE
· CT: Findings consistent with acute pancreatitis  No evidence of free intraperitoneal air or collection  · GI consult  · NPO  · Dilaudid 1 mg IV q 4h p r n   Breakthrough pain  ·  mL/hour  · Lipase in AM

## 2018-09-27 PROBLEM — E66.01 MORBID OBESITY (HCC): Status: ACTIVE | Noted: 2018-09-27

## 2018-09-27 LAB
ALBUMIN SERPL BCP-MCNC: 2.5 G/DL (ref 3.5–5)
ALP SERPL-CCNC: 106 U/L (ref 46–116)
ALT SERPL W P-5'-P-CCNC: 40 U/L (ref 12–78)
AMYLASE SERPL-CCNC: 33 IU/L (ref 25–115)
ANION GAP SERPL CALCULATED.3IONS-SCNC: 7 MMOL/L (ref 4–13)
AST SERPL W P-5'-P-CCNC: 48 U/L (ref 5–45)
BACTERIA UR QL AUTO: ABNORMAL /HPF
BASOPHILS # BLD AUTO: 0.01 THOUSANDS/ΜL (ref 0–0.1)
BASOPHILS NFR BLD AUTO: 0 % (ref 0–1)
BILIRUB DIRECT SERPL-MCNC: 0.46 MG/DL (ref 0–0.2)
BILIRUB SERPL-MCNC: 2.9 MG/DL (ref 0.2–1)
BILIRUB UR QL STRIP: ABNORMAL
BUN SERPL-MCNC: 11 MG/DL (ref 5–25)
CALCIUM SERPL-MCNC: 8.9 MG/DL (ref 8.3–10.1)
CHLORIDE SERPL-SCNC: 97 MMOL/L (ref 100–108)
CHOLEST SERPL-MCNC: 141 MG/DL (ref 50–200)
CLARITY UR: ABNORMAL
CO2 SERPL-SCNC: 31 MMOL/L (ref 21–32)
COLOR UR: ABNORMAL
CREAT SERPL-MCNC: 0.57 MG/DL (ref 0.6–1.3)
EOSINOPHIL # BLD AUTO: 0.26 THOUSAND/ΜL (ref 0–0.61)
EOSINOPHIL NFR BLD AUTO: 2 % (ref 0–6)
ERYTHROCYTE [DISTWIDTH] IN BLOOD BY AUTOMATED COUNT: 13.4 % (ref 11.6–15.1)
GFR SERPL CREATININE-BSD FRML MDRD: 114 ML/MIN/1.73SQ M
GLUCOSE SERPL-MCNC: 81 MG/DL (ref 65–140)
GLUCOSE UR STRIP-MCNC: NEGATIVE MG/DL
HCT VFR BLD AUTO: 42.4 % (ref 34.8–46.1)
HDLC SERPL-MCNC: 21 MG/DL (ref 40–60)
HGB BLD-MCNC: 14.2 G/DL (ref 11.5–15.4)
HGB UR QL STRIP.AUTO: NEGATIVE
IMM GRANULOCYTES # BLD AUTO: 0.02 THOUSAND/UL (ref 0–0.2)
IMM GRANULOCYTES NFR BLD AUTO: 0 % (ref 0–2)
KETONES UR STRIP-MCNC: ABNORMAL MG/DL
LDLC SERPL CALC-MCNC: 100 MG/DL (ref 0–100)
LEUKOCYTE ESTERASE UR QL STRIP: ABNORMAL
LIPASE SERPL-CCNC: 270 U/L (ref 73–393)
LYMPHOCYTES # BLD AUTO: 1.1 THOUSANDS/ΜL (ref 0.6–4.47)
LYMPHOCYTES NFR BLD AUTO: 9 % (ref 14–44)
MCH RBC QN AUTO: 36.7 PG (ref 26.8–34.3)
MCHC RBC AUTO-ENTMCNC: 33.5 G/DL (ref 31.4–37.4)
MCV RBC AUTO: 110 FL (ref 82–98)
MONOCYTES # BLD AUTO: 0.76 THOUSAND/ΜL (ref 0.17–1.22)
MONOCYTES NFR BLD AUTO: 7 % (ref 4–12)
MUCOUS THREADS UR QL AUTO: ABNORMAL
NEUTROPHILS # BLD AUTO: 9.56 THOUSANDS/ΜL (ref 1.85–7.62)
NEUTS SEG NFR BLD AUTO: 82 % (ref 43–75)
NITRITE UR QL STRIP: POSITIVE
NON-SQ EPI CELLS URNS QL MICRO: ABNORMAL /HPF
NONHDLC SERPL-MCNC: 120 MG/DL
PH UR STRIP.AUTO: 6 [PH] (ref 4.5–8)
PLATELET # BLD AUTO: 156 THOUSANDS/UL (ref 149–390)
PMV BLD AUTO: 11.9 FL (ref 8.9–12.7)
POTASSIUM SERPL-SCNC: 4.5 MMOL/L (ref 3.5–5.3)
PROT SERPL-MCNC: 7.1 G/DL (ref 6.4–8.2)
PROT UR STRIP-MCNC: ABNORMAL MG/DL
RBC # BLD AUTO: 3.87 MILLION/UL (ref 3.81–5.12)
RBC #/AREA URNS AUTO: ABNORMAL /HPF
SODIUM SERPL-SCNC: 135 MMOL/L (ref 136–145)
SP GR UR STRIP.AUTO: 1.02 (ref 1–1.03)
TRIGL SERPL-MCNC: 100 MG/DL
UROBILINOGEN UR QL STRIP.AUTO: 4 E.U./DL
WBC # BLD AUTO: 11.71 THOUSAND/UL (ref 4.31–10.16)
WBC #/AREA URNS AUTO: ABNORMAL /HPF

## 2018-09-27 PROCEDURE — 82248 BILIRUBIN DIRECT: CPT | Performed by: SURGERY

## 2018-09-27 PROCEDURE — 83690 ASSAY OF LIPASE: CPT | Performed by: NURSE PRACTITIONER

## 2018-09-27 PROCEDURE — 82150 ASSAY OF AMYLASE: CPT | Performed by: NURSE PRACTITIONER

## 2018-09-27 PROCEDURE — 81001 URINALYSIS AUTO W/SCOPE: CPT | Performed by: INTERNAL MEDICINE

## 2018-09-27 PROCEDURE — 99232 SBSQ HOSP IP/OBS MODERATE 35: CPT | Performed by: SURGERY

## 2018-09-27 PROCEDURE — 80053 COMPREHEN METABOLIC PANEL: CPT | Performed by: NURSE PRACTITIONER

## 2018-09-27 PROCEDURE — 85025 COMPLETE CBC W/AUTO DIFF WBC: CPT | Performed by: NURSE PRACTITIONER

## 2018-09-27 PROCEDURE — 80061 LIPID PANEL: CPT | Performed by: NURSE PRACTITIONER

## 2018-09-27 PROCEDURE — 99232 SBSQ HOSP IP/OBS MODERATE 35: CPT | Performed by: INTERNAL MEDICINE

## 2018-09-27 RX ORDER — KETOROLAC TROMETHAMINE 30 MG/ML
15 INJECTION, SOLUTION INTRAMUSCULAR; INTRAVENOUS EVERY 6 HOURS PRN
Status: DISCONTINUED | OUTPATIENT
Start: 2018-09-27 | End: 2018-09-29 | Stop reason: HOSPADM

## 2018-09-27 RX ADMIN — ONDANSETRON 4 MG: 2 INJECTION, SOLUTION INTRAMUSCULAR; INTRAVENOUS at 14:47

## 2018-09-27 RX ADMIN — SODIUM CHLORIDE, SODIUM LACTATE, POTASSIUM CHLORIDE, AND CALCIUM CHLORIDE 100 ML/HR: .6; .31; .03; .02 INJECTION, SOLUTION INTRAVENOUS at 21:08

## 2018-09-27 RX ADMIN — ENOXAPARIN SODIUM 40 MG: 40 INJECTION SUBCUTANEOUS at 08:31

## 2018-09-27 RX ADMIN — HYDROMORPHONE HYDROCHLORIDE 1 MG: 1 INJECTION, SOLUTION INTRAMUSCULAR; INTRAVENOUS; SUBCUTANEOUS at 06:11

## 2018-09-27 RX ADMIN — ONDANSETRON 4 MG: 2 INJECTION, SOLUTION INTRAMUSCULAR; INTRAVENOUS at 06:11

## 2018-09-27 RX ADMIN — KETOROLAC TROMETHAMINE 15 MG: 30 INJECTION, SOLUTION INTRAMUSCULAR; INTRAVENOUS at 08:31

## 2018-09-27 RX ADMIN — HYDROMORPHONE HYDROCHLORIDE 1 MG: 1 INJECTION, SOLUTION INTRAMUSCULAR; INTRAVENOUS; SUBCUTANEOUS at 14:47

## 2018-09-27 RX ADMIN — HYDROMORPHONE HYDROCHLORIDE 1 MG: 1 INJECTION, SOLUTION INTRAMUSCULAR; INTRAVENOUS; SUBCUTANEOUS at 00:41

## 2018-09-27 RX ADMIN — SODIUM CHLORIDE, SODIUM LACTATE, POTASSIUM CHLORIDE, AND CALCIUM CHLORIDE 200 ML/HR: .6; .31; .03; .02 INJECTION, SOLUTION INTRAVENOUS at 08:59

## 2018-09-27 NOTE — ASSESSMENT & PLAN NOTE
Patient's abdominal pain is improving is intermittent  Lipase is normal this morning  MRCP showed no biliary duct dilatation but revealed cholelithiasis without cholecystitis  Surgery is on case  Will try to place patient on clear liquid diet  Will decrease IV fluid to 100 cc an hour    Will follow LFTs

## 2018-09-27 NOTE — PROGRESS NOTES
Progress Note - Cecily Randhawa 1975, 37 y o  female MRN: [id number]    Unit/Bed#: [address] [place] [address] Encounter: [id number]    Primary Care Provider: Gil Rosado MD   Date and time admitted to hospital: 2018  5:36 PM        * Acute pancreatitis   Assessment & Plan    Patient's abdominal pain is improving is intermittent  Lipase is normal this morning  MRCP showed no biliary duct dilatation but revealed cholelithiasis without cholecystitis  Surgery is on case  Will try to place patient on clear liquid diet  Will decrease IV fluid to 100 cc an hour  Will follow LFTs     Cholelithiasis   Assessment & Plan    MRCP showed no cholecystitis     Morbid obesity ([address] )   Assessment & Plan    BMI is 54 22, will need outpatient management         VTE Prophylaxis: in place    Patient Centered Rounds: I rounded with patient's nurse    Current Length of Stay: 2 day(s)    Current Patient Status: Inpatient    Certification Statement: Pt requires additional inpatient hospital stay due to: see assessment and plan        Subjective:   Patient complains of intermittent sharp epigastric abdominal pain which is relieved with IV narcotics  Denies any nausea, chest pain, pleurisy, shortness of breath, dysuria, frequency, urgency  Yesterday she had an episode of mohini looking urine  Urinalysis shows no hematuria and it has plenty of epithelial cells suggesting contamination  I doubt UTI  Patient denies having her period    All other ROS are negative    Objective:     Vitals:   Temp (24hrs), Av 9 °F (37 2 °C), Min:98 °F (36 7 °C), Max:100 5 °F (38 1 °C)    HR:  [] 94  Resp:  [18-20] 18  BP: (145-172)/(78-91) 145/78  SpO2:  [91 %-94 %] 94 %  Body mass index is 54 22 kg/m²  Input and Output Summary (last 24 hours):        Intake/Output Summary (Last 24 hours) at 18 1010  Last data filed at 18 2101   Gross per 24 hour   Intake          3456 67 ml   Output                0 ml   Net          3456 67 ml       Physical Exam:     Physical Exam   Constitutional: She is oriented to person, place, and time  She appears well-developed  No distress  HENT:   Head: Normocephalic  Mouth/Throat: Oropharynx is clear and moist    Eyes: Conjunctivae are normal    Neck: Neck supple  Cardiovascular: Normal rate and regular rhythm  Pulmonary/Chest: Effort normal  No respiratory distress  She has no wheezes  She has no rales  Abdominal: Soft  Bowel sounds are normal  She exhibits no distension  There is tenderness (Patient has mild epigastric tenderness)  Musculoskeletal: She exhibits no tenderness  Neurological: She is alert and oriented to person, place, and time  No cranial nerve deficit  Skin: Skin is warm and dry  Psychiatric: She has a normal mood and affect  Vitals reviewed  I personally reviewed labs and imaging reports for today  Last 24 Hours Medication List:     Current Facility-Administered Medications:  enoxaparin 40 mg Subcutaneous Daily Stefani Lane PA-C    HYDROmorphone 1 mg Intravenous Q3H PRN Stefani Lane PA-C    iohexol 50 mL Oral Once in imaging Jay Wilson MD    ketorolac 15 mg Intravenous Q6H PRN GyWILLY Zuniga    lactated ringers 100 mL/hr Intravenous Continuous Seth Conrad MD Last Rate: 200 mL/hr (09/27/18 0859)   ondansetron 4 mg Intravenous Q6H PRN Stefani Lane PA-C           Today, Patient Was Seen By: Seth Conrad MD    ** Please Note: Dictation voice to text software may have been used in the creation of this document   **

## 2018-09-27 NOTE — PROGRESS NOTES
Progress Note - General Surgery   Cele Frazier 37 y o  female MRN: [id number]  Unit/Bed#: [address] [place] [address] [id number] Encounter: [id number]    Assessment/Plan:  1  Gall stone pancreatitis  -likely passed stone prior to MRCP  -Possible combination of ETOH as cause  -Improving LFTS however increased Tbili today  -Repeat labs tomorrow  -aggressive IVF hydration per GI  -On clear liquid diet  -OP cholecystectomy    2  Ventral incisional hernia  -without incarceration  -she understands repair will be delayed until she loses weight    3  Morbid obesity (BMI>50)  -with hx open gastric sleeve      Subjective/Objective   Chief Complaint: improving abdominal pain    Subjective: Reports her abdominal pain is improving but continues to breakthrough the pain medications  Some nausea with a headache this am but it has since resolved  Tolerating clear liquids without further pain or nausea  Objective:     Blood pressure 145/78, pulse 94, temperature 98 5 °F (36 9 °C), temperature source Oral, resp  rate 18, height 5' 5" (1 651 m), weight (!) 148 kg (325 lb 13 4 oz), last menstrual period 09/25/2018, SpO2 94 %  ,Body mass index is 54 22 kg/m²        Intake/Output Summary (Last 24 hours) at 09/27/18 1152  Last data filed at 09/26/18 2101   Gross per 24 hour   Intake          3456 67 ml   Output                0 ml   Net          3456 67 ml       Invasive Devices     Peripheral Intravenous Line            Peripheral IV 09/27/18 Left Hand less than 1 day                Physical Exam: /78 (BP Location: Right arm)   Pulse 94   Temp 98 5 °F (36 9 °C) (Oral)   Resp 18   Ht 5' 5" (1 651 m)   Wt (!) 148 kg (325 lb 13 4 oz)   LMP 09/25/2018   SpO2 94%   BMI 54 22 kg/m²     General Appearance:    Alert, cooperative, no distress   Eyes:    PERRL, EOM's intact,   Lungs:     Clear to auscultation bilaterally, respirations unlabored    Heart:    Regular rate and rhythm, S1 and S2 normal, no murmur, rub   or gallop   Abdomen:     Soft, mild tenderness to palp at RUQ and epigastric, bowel sounds active all four quadrants,    Extremities:   Extremities normal, no cyanosis or edema   Skin:   Skin color, texture, turgor normal, no rashes or lesions       Lab, Imaging and other studies:  CBC:   Lab Results   Component Value Date    WBC 11 71 (H) 09/27/2018    HGB 14 2 09/27/2018    HCT 42 4 09/27/2018     (H) 09/27/2018     09/27/2018    MCH 36 7 (H) 09/27/2018    MCHC 33 5 09/27/2018    RDW 13 4 09/27/2018    MPV 11 9 09/27/2018   , CMP:   Lab Results   Component Value Date     (L) 09/27/2018    K 4 5 09/27/2018    CL 97 (L) 09/27/2018    CO2 31 09/27/2018    BUN 11 09/27/2018    CREATININE 0 57 (L) 09/27/2018    CALCIUM 8 9 09/27/2018    AST 48 (H) 09/27/2018    ALT 40 09/27/2018    ALKPHOS 106 09/27/2018    EGFR 114 09/27/2018   , Amylase:   Lab Results   Component Value Date    AMYLASE 33 09/27/2018   , Lipase:   Lab Results   Component Value Date    LIPASE 270 09/27/2018     VTE Pharmacologic Prophylaxis: Enoxaparin (Lovenox)  VTE Mechanical Prophylaxis: sequential compression device

## 2018-09-27 NOTE — PROGRESS NOTES
Cele Frazier  [id number]    37 y o   female      ASSESSMENT  1  Gallstone pancreatitis, acute  Abdominal pain slightly improved  Possibly related to alcohol or combination of both  Uncomplicated  This represents her 1st episode  Pancreatic enzymes normalized  LFTs improved with the exception of total bilirubin slightly elevated at 2 90  No evidence of choledocholithiasis noted on MRCP  2  Ventral hernia - per surgery, she will be followed as an as an outpatient and will need to lose weight prior to repair    PLAN  1  Continue aggressive IV fluids  2  Advance diet to clear liquid  3  Follow labs  4  Per surgery plan for outpatient laparoscopic cholecystectomy      Chief Complaint   Patient presents with    Pelvic Pain     pt presents to ED c/o pelvic pain sincesunday  It started in her back and now is in her pelvic area  States it feels the same has when she had cysts rupture in the past  Rates pain 8/10       SUBJECTIVE/HPI   Patient reports pain early this morning  Low-grade temp of a 100 5° last evening  Complain of headache  Denies nausea or vomiting      /78 (BP Location: Right arm)   Pulse 94   Temp 98 5 °F (36 9 °C) (Oral)   Resp 18   Ht 5' 5" (1 651 m)   Wt (!) 148 kg (325 lb 13 4 oz)   LMP 09/25/2018   SpO2 94%   BMI 54 22 kg/m²       PHYSICALEXAM  Constitutional:  Morbidly obese,  no acute distress, non-toxic appearance   Eyes:   conjunctiva normal   HENT:  Atraumatic  Respiratory:  No respiratory distress  Cardiovascular:  Normal rate  GI:  Soft, nondistended, normal bowel sounds, obese, mild tenderness right upper and right lower quadrant, no rebound or guarding  Musculoskeletal:  No edema  Integument:  Well hydrated, positive tattoos  Neurologic:  Alert & oriented x 3  Psychiatric:  Speech and behavior appropriate       Lab Results   Component Value Date    CALCIUM 8 9 09/27/2018     (L) 09/27/2018    K 4 5 09/27/2018    CO2 31 09/27/2018    CL 97 (L) 09/27/2018    BUN 11 09/27/2018    CREATININE 0 57 (L) 09/27/2018     Lab Results   Component Value Date    WBC 11 71 (H) 09/27/2018    HGB 14 2 09/27/2018    HCT 42 4 09/27/2018     (H) 09/27/2018     09/27/2018     Lab Results   Component Value Date    ALT 40 09/27/2018    AST 48 (H) 09/27/2018    ALKPHOS 106 09/27/2018     Lab Results   Component Value Date    AMYLASE 33 09/27/2018     Lab Results   Component Value Date    LIPASE 270 09/27/2018     No results found for: IRON, TIBC, FERRITIN  No results found for: INR    Counseling / Coordination of Care  Total floor / unit time spent today 25 minutes  Greater than 50% of total time was spent with the patient and / or family counseling and / or coordination of care  A description of the counseling / coordination of care: Rodrick Hayden

## 2018-09-28 LAB
ALBUMIN SERPL BCP-MCNC: 2.4 G/DL (ref 3.5–5)
ALP SERPL-CCNC: 124 U/L (ref 46–116)
ALT SERPL W P-5'-P-CCNC: 35 U/L (ref 12–78)
ANION GAP SERPL CALCULATED.3IONS-SCNC: 14 MMOL/L (ref 4–13)
AST SERPL W P-5'-P-CCNC: 29 U/L (ref 5–45)
ATRIAL RATE: 80 BPM
BILIRUB DIRECT SERPL-MCNC: 2 MG/DL (ref 0–0.2)
BILIRUB SERPL-MCNC: 2.9 MG/DL (ref 0.2–1)
BUN SERPL-MCNC: 11 MG/DL (ref 5–25)
CALCIUM SERPL-MCNC: 8.5 MG/DL (ref 8.3–10.1)
CHLORIDE SERPL-SCNC: 98 MMOL/L (ref 100–108)
CO2 SERPL-SCNC: 27 MMOL/L (ref 21–32)
CREAT SERPL-MCNC: 0.63 MG/DL (ref 0.6–1.3)
GFR SERPL CREATININE-BSD FRML MDRD: 110 ML/MIN/1.73SQ M
GLUCOSE SERPL-MCNC: 72 MG/DL (ref 65–140)
P AXIS: 47 DEGREES
POTASSIUM SERPL-SCNC: 2.9 MMOL/L (ref 3.5–5.3)
PR INTERVAL: 196 MS
PROT SERPL-MCNC: 6.5 G/DL (ref 6.4–8.2)
QRS AXIS: -2 DEGREES
QRSD INTERVAL: 102 MS
QT INTERVAL: 384 MS
QTC INTERVAL: 442 MS
SODIUM SERPL-SCNC: 139 MMOL/L (ref 136–145)
T WAVE AXIS: 9 DEGREES
VENTRICULAR RATE: 80 BPM

## 2018-09-28 PROCEDURE — 82248 BILIRUBIN DIRECT: CPT | Performed by: PHYSICIAN ASSISTANT

## 2018-09-28 PROCEDURE — 93010 ELECTROCARDIOGRAM REPORT: CPT | Performed by: INTERNAL MEDICINE

## 2018-09-28 PROCEDURE — 99232 SBSQ HOSP IP/OBS MODERATE 35: CPT | Performed by: INTERNAL MEDICINE

## 2018-09-28 PROCEDURE — 93005 ELECTROCARDIOGRAM TRACING: CPT

## 2018-09-28 PROCEDURE — 80053 COMPREHEN METABOLIC PANEL: CPT | Performed by: INTERNAL MEDICINE

## 2018-09-28 PROCEDURE — 99232 SBSQ HOSP IP/OBS MODERATE 35: CPT | Performed by: PHYSICIAN ASSISTANT

## 2018-09-28 RX ORDER — POTASSIUM CHLORIDE 20 MEQ/1
20 TABLET, EXTENDED RELEASE ORAL 2 TIMES DAILY
Status: COMPLETED | OUTPATIENT
Start: 2018-09-28 | End: 2018-09-28

## 2018-09-28 RX ORDER — HYDROCODONE BITARTRATE AND ACETAMINOPHEN 5; 325 MG/1; MG/1
1 TABLET ORAL EVERY 6 HOURS PRN
Status: DISCONTINUED | OUTPATIENT
Start: 2018-09-28 | End: 2018-09-29 | Stop reason: HOSPADM

## 2018-09-28 RX ORDER — CALCIUM CARBONATE 200(500)MG
500 TABLET,CHEWABLE ORAL 3 TIMES DAILY PRN
Status: DISCONTINUED | OUTPATIENT
Start: 2018-09-28 | End: 2018-09-29 | Stop reason: HOSPADM

## 2018-09-28 RX ADMIN — CALCIUM CARBONATE (ANTACID) CHEW TAB 500 MG 500 MG: 500 CHEW TAB at 18:47

## 2018-09-28 RX ADMIN — KETOROLAC TROMETHAMINE 15 MG: 30 INJECTION, SOLUTION INTRAMUSCULAR; INTRAVENOUS at 01:29

## 2018-09-28 RX ADMIN — HYDROCODONE BITARTRATE AND ACETAMINOPHEN 1 TABLET: 5; 325 TABLET ORAL at 23:40

## 2018-09-28 RX ADMIN — ENOXAPARIN SODIUM 40 MG: 40 INJECTION SUBCUTANEOUS at 09:09

## 2018-09-28 RX ADMIN — POTASSIUM CHLORIDE 20 MEQ: 1500 TABLET, EXTENDED RELEASE ORAL at 17:13

## 2018-09-28 RX ADMIN — SODIUM CHLORIDE, SODIUM LACTATE, POTASSIUM CHLORIDE, AND CALCIUM CHLORIDE 100 ML/HR: .6; .31; .03; .02 INJECTION, SOLUTION INTRAVENOUS at 06:55

## 2018-09-28 RX ADMIN — HYDROCODONE BITARTRATE AND ACETAMINOPHEN 1 TABLET: 5; 325 TABLET ORAL at 17:13

## 2018-09-28 RX ADMIN — HYDROCODONE BITARTRATE AND ACETAMINOPHEN 1 TABLET: 5; 325 TABLET ORAL at 10:23

## 2018-09-28 RX ADMIN — POTASSIUM CHLORIDE 20 MEQ: 1500 TABLET, EXTENDED RELEASE ORAL at 09:10

## 2018-09-28 NOTE — PROGRESS NOTES
Progress Note - Su Siddiqui 37 y o  female MRN: [id number]    Unit/Bed#: [address] [place] [address] Encounter: [id number]      Assessment:    Principal Problem:    Acute pancreatitis  Active Problems:    Cholelithiasis    Ventral hernia without obstruction or gangrene    Morbid obesity ([address] )  Resolved Problems:    * No resolved hospital problems  *    Hypokalemia  Plan:  DC IV fluids  Advance to surgical soft diet  If patient is stable discharge tomorrow with outpatient cholecystectomy  Subjective: Tolerating liquids  Minimal abdominal pain  Objective:     Vitals: Blood pressure 161/84, pulse 90, temperature 97 8 °F (36 6 °C), temperature source Oral, resp  rate 18, height 5' 5" (1 651 m), weight (!) 148 kg (325 lb 11 7 oz), last menstrual period 09/25/2018, SpO2 94 %  ,Body mass index is 54 2 kg/m²  Intake/Output Summary (Last 24 hours) at 09/28/18 0847  Last data filed at 09/28/18 0301   Gross per 24 hour   Intake             2860 ml   Output                0 ml   Net             2860 ml       Physical Exam:    Lungs clear to auscultation bilaterally  Heart regular rate and rythm, normal heart sounds  Abdomen soft, active bowel sounds, no tenderness presentExtremities: no cyanosis, clubbing or edema        Invasive Devices     Peripheral Intravenous Line            Peripheral IV 09/27/18 Left Hand less than 1 day                            Lab, Imaging and other studies: I have personally reviewed pertinent reports         Results from last 7 days  Lab Units 09/27/18  0523 09/26/18  0557 09/25/18  1840   WBC Thousand/uL 11 71* 11 53* 12 42*   HEMOGLOBIN g/dL 14 2 15 0 16 9*   HEMATOCRIT % 42 4 44 0 49 6*   PLATELETS Thousands/uL 156 163 180   NEUTROS PCT % 82* 87* 83*   LYMPHS PCT % 9* 7* 10*   MONOS PCT % 7 5 6   EOS PCT % 2 0 0       Results from last 7 days  Lab Units 09/28/18  0512 09/27/18  0522 09/26/18  0557   SODIUM mmol/L 139 135* 133*   POTASSIUM mmol/L 2 9* 4 5 4 7   CHLORIDE mmol/L 98* 97* 94* CO2 mmol/L 27 31 28   BUN mg/dL 11 11 10   CREATININE mg/dL 0 63 0 57* 0 55*   CALCIUM mg/dL 8 5 8 9 9 4   ALK PHOS U/L 124* 106 121*   ALT U/L 35 40 53   AST U/L 29 48* 55*     No results found for: TROPONINI, CKMB, CKTOTAL      No results found for: Ferriday Jaziel, SPUTUMCULTUR    Imaging:  No results found for this or any previous visit  No results found for this or any previous visit  PATIENT CENTERED ROUNDS: I have performed rounds with the nursing staff  This note has been constructed using a voice recognition system      Rosangela Landaverde MD

## 2018-09-28 NOTE — PROGRESS NOTES
Progress Note - General Surgery   Rand Pena 37 y o  female MRN: [id number]  Unit/Bed#: [address] [facility] 205-01 Encounter: [id number]  Assessment/Plan:  1  Gall stone pancreatitis-improving  -likely passed stone prior to MRCP done 9/26 which showed no findings of choledocholithiasis  -Possible combination of ETOH as cause  - abdominal pain improving  Pancreatic enzymes normalized  Improving LFTS,  however increased Tbili and Dbili  -Repeat labs tomorrow- if Tbili and Dbili continue to trend upward would consider ERCP in the operating room due to her history of gastric bypass surgery, can be done in combination with cholecystectomy  -okay for diet as tolerated  -encourage oral pain control    2  Hypokalemia- replace and monitor     3  Ventral incisional hernia  -without incarceration  -she understands repair will be delayed until she loses weight     4  Morbid obesity (BMI>50)  -with hx open gastric bypass in 2002  Initially with significant weight loss but admits to gaining almost all of her weight back  -patient should follow up with bariatric surgery after discharge     5  ETOH abuse  -patient admits to binge drinking on weekends and it intermittent alcohol use during the week     Subjective/Objective   Chief Complaint:   Abdominal pain    Subjective:   Admits to improving abdominal pain  Tolerating clear liquids  No nausea or vomiting  Denies any fevers or chills  Objective:     Blood pressure 161/84, pulse 90, temperature 97 8 °F (36 6 °C), temperature source Oral, resp  rate 18, height 5' 5" (1 651 m), weight (!) 148 kg (325 lb 11 7 oz), last menstrual period 09/25/2018, SpO2 94 %  ,Body mass index is 54 2 kg/m²        Intake/Output Summary (Last 24 hours) at 09/28/18 1222  Last data filed at 09/28/18 0301   Gross per 24 hour   Intake             2860 ml   Output                0 ml   Net             2860 ml       Invasive Devices     Peripheral Intravenous Line            Peripheral IV 09/27/18 Left Hand 1 day                Physical Exam   General:   morbidly obese woman  no acute distress, non-toxic appearance   Eyes:   conjunctiva normal, anicteric  EOM's in tact  Neck: supple  trachea midline  No JVD  Respiratory:  Lungs clear to auscultation  No respiratory distress  Cardiovascular:  Regular rate and rhythm  No murmur  GI:   obese, soft, nondistended, normal bowel sounds, tenderness in right upper and lower quadrants, no guarding or rebound, positive midline incisional hernia without tenderness  Skin:  warm and dry  Large vertical scar over central abdomen  No jaundice  Lab, Imaging and other studies:  I have personally reviewed pertinent lab results    , CBC: No results found for: WBC, HGB, HCT, MCV, PLT, ADJUSTEDWBC, MCH, MCHC, RDW, MPV, NRBC, CMP:   Lab Results   Component Value Date     09/28/2018    K 2 9 (L) 09/28/2018    CL 98 (L) 09/28/2018    CO2 27 09/28/2018    BUN 11 09/28/2018    CREATININE 0 63 09/28/2018    CALCIUM 8 5 09/28/2018    AST 29 09/28/2018    ALT 35 09/28/2018    ALKPHOS 124 (H) 09/28/2018    EGFR 110 09/28/2018     Bilirubin, Direct 0 00 - 0 20 mg/dL 2 00   H        VTE Pharmacologic Prophylaxis:   Lovenox  VTE Mechanical Prophylaxis: sequential compression device     Louise Johnny Arevalo PA-C

## 2018-09-28 NOTE — PROGRESS NOTES
Radha Borges  [id number]    37 y o   female      ASSESSMENT  1  Gallstone pancreatitis, acute  Abdominal pain slightly improved  Alcohol possibly contributing as she admits to binge drinking on the weekends, or combination of both   Uncomplicated   This represents her 1st episode  Pancreatic enzymes normalized  LFTs improved with the exception of total bilirubin slightly elevated at 2 90  No evidence of choledocholithiasis noted on MRCP      2  Ventral hernia - per surgery, she will be followed as an as an outpatient and will need to lose weight prior to repair       PLAN  1  Continue aggressive IV fluids  2  Clear liquids and advance diet to full liquids as tolerated  3  Follow labs and clinical course  4  Surgical plans for an outpatient laparoscopic cholecystectomy  5  Attempt to wean narcotics    Chief Complaint   Patient presents with    Pelvic Pain     pt presents to ED c/o pelvic pain sincesunday  It started in her back and now is in her pelvic area  States it feels the same has when she had cysts rupture in the past  Rates pain 8/10       SUBJECTIVE/HPI   Patient with ongoing abdominal pain but is slightly less than yesterday  Still receiving IV narcotics basically around the clock      /84 (BP Location: Right arm)   Pulse 90   Temp 97 8 °F (36 6 °C) (Oral)   Resp 18   Ht 5' 5" (1 651 m)   Wt (!) 148 kg (325 lb 11 7 oz)   LMP 09/25/2018   SpO2 94%   BMI 54 20 kg/m²       PHYSICALEXAM  Constitutional:  Well developed, well nourished, no acute distress, non-toxic appearance   Eyes:  conjunctiva normal   HENT:  Atraumatic  Respiratory:  No respiratory distress  Cardiovascular:  Normal rate  GI:  Soft, nondistended, normal bowel sounds, mild right upper and right lower quadrant abdominal tenderness, no rebound or guarding  Musculoskeletal:  No edema   Neurologic:  Alert & oriented x 3  Psychiatric:  Speech and behavior appropriate       Lab Results   Component Value Date    CALCIUM 8 5 09/28/2018     09/28/2018    K 2 9 (L) 09/28/2018    CO2 27 09/28/2018    CL 98 (L) 09/28/2018    BUN 11 09/28/2018    CREATININE 0 63 09/28/2018     Lab Results   Component Value Date    WBC 11 71 (H) 09/27/2018    HGB 14 2 09/27/2018    HCT 42 4 09/27/2018     (H) 09/27/2018     09/27/2018     Lab Results   Component Value Date    ALT 35 09/28/2018    AST 29 09/28/2018    ALKPHOS 124 (H) 09/28/2018     Lab Results   Component Value Date    AMYLASE 33 09/27/2018     Lab Results   Component Value Date    LIPASE 270 09/27/2018     No results found for: IRON, TIBC, FERRITIN  No results found for: INR    Counseling / Coordination of Care  Total floor / unit time spent today 25 minutes  Greater than 50% of total time was spent with the patient and / or family counseling and / or coordination of care  A description of the counseling / coordination of care: Rodrick Carranza

## 2018-09-29 VITALS
DIASTOLIC BLOOD PRESSURE: 91 MMHG | TEMPERATURE: 98.4 F | OXYGEN SATURATION: 95 % | HEART RATE: 84 BPM | SYSTOLIC BLOOD PRESSURE: 156 MMHG | RESPIRATION RATE: 18 BRPM | BODY MASS INDEX: 48.82 KG/M2 | HEIGHT: 65 IN | WEIGHT: 293 LBS

## 2018-09-29 LAB
ALBUMIN SERPL BCP-MCNC: 2.6 G/DL (ref 3.5–5)
ALP SERPL-CCNC: 157 U/L (ref 46–116)
ALT SERPL W P-5'-P-CCNC: 45 U/L (ref 12–78)
ANION GAP SERPL CALCULATED.3IONS-SCNC: 10 MMOL/L (ref 4–13)
AST SERPL W P-5'-P-CCNC: 45 U/L (ref 5–45)
BILIRUB DIRECT SERPL-MCNC: 1.75 MG/DL (ref 0–0.2)
BILIRUB SERPL-MCNC: 2.4 MG/DL (ref 0.2–1)
BUN SERPL-MCNC: 9 MG/DL (ref 5–25)
CALCIUM SERPL-MCNC: 8.5 MG/DL (ref 8.3–10.1)
CHLORIDE SERPL-SCNC: 98 MMOL/L (ref 100–108)
CO2 SERPL-SCNC: 29 MMOL/L (ref 21–32)
CREAT SERPL-MCNC: 0.64 MG/DL (ref 0.6–1.3)
GFR SERPL CREATININE-BSD FRML MDRD: 110 ML/MIN/1.73SQ M
GLUCOSE SERPL-MCNC: 89 MG/DL (ref 65–140)
LIPASE SERPL-CCNC: 588 U/L (ref 73–393)
POTASSIUM SERPL-SCNC: 3 MMOL/L (ref 3.5–5.3)
PROT SERPL-MCNC: 6.8 G/DL (ref 6.4–8.2)
SODIUM SERPL-SCNC: 137 MMOL/L (ref 136–145)

## 2018-09-29 PROCEDURE — 80048 BASIC METABOLIC PNL TOTAL CA: CPT | Performed by: INTERNAL MEDICINE

## 2018-09-29 PROCEDURE — 83690 ASSAY OF LIPASE: CPT | Performed by: INTERNAL MEDICINE

## 2018-09-29 PROCEDURE — 99232 SBSQ HOSP IP/OBS MODERATE 35: CPT | Performed by: SURGERY

## 2018-09-29 PROCEDURE — 80076 HEPATIC FUNCTION PANEL: CPT | Performed by: PHYSICIAN ASSISTANT

## 2018-09-29 PROCEDURE — 99238 HOSP IP/OBS DSCHRG MGMT 30/<: CPT | Performed by: INTERNAL MEDICINE

## 2018-09-29 RX ORDER — PANTOPRAZOLE SODIUM 40 MG/1
40 TABLET, DELAYED RELEASE ORAL DAILY
Qty: 14 TABLET | Refills: 0 | Status: SHIPPED | OUTPATIENT
Start: 2018-09-29 | End: 2019-01-30

## 2018-09-29 RX ORDER — LORAZEPAM 1 MG/1
1 TABLET ORAL ONCE
Status: COMPLETED | OUTPATIENT
Start: 2018-09-29 | End: 2018-09-29

## 2018-09-29 RX ORDER — POTASSIUM CHLORIDE 20 MEQ/1
40 TABLET, EXTENDED RELEASE ORAL 2 TIMES DAILY
Status: DISCONTINUED | OUTPATIENT
Start: 2018-09-29 | End: 2018-09-29 | Stop reason: HOSPADM

## 2018-09-29 RX ORDER — HYDROCODONE BITARTRATE AND ACETAMINOPHEN 5; 325 MG/1; MG/1
1 TABLET ORAL EVERY 6 HOURS PRN
Qty: 18 TABLET | Refills: 0 | Status: SHIPPED | OUTPATIENT
Start: 2018-09-29 | End: 2018-10-06

## 2018-09-29 RX ADMIN — ENOXAPARIN SODIUM 40 MG: 40 INJECTION SUBCUTANEOUS at 09:52

## 2018-09-29 RX ADMIN — LORAZEPAM 1 MG: 1 TABLET ORAL at 12:33

## 2018-09-29 RX ADMIN — POTASSIUM CHLORIDE 40 MEQ: 1500 TABLET, EXTENDED RELEASE ORAL at 11:46

## 2018-09-29 NOTE — PROGRESS NOTES
[facility]  [id number]    37 y o   female      ASSESSMENT  69-year-old female with gallstone pancreatitis, acute   Abdominal pain improved  Alcohol possibly contributing as she admits to binge drinking on the weekends, or combination of both   Uncomplicated   This represents her 1st episode  Pancreatic enzymes normalized except lipase still in the 500 range     LFTs improved  No evidence of choledocholithiasis noted on MRCP      PLAN  Ambulate  I think she can be discharged if she is tolerating p o  Without pain      SUBJECTIVE/HPI tolerating chicken noodle soup without pain    /91 (BP Location: Right arm)   Pulse 84   Temp 98 4 °F (36 9 °C) (Oral)   Resp 18   Ht 5' 5" (1 651 m)   Wt (!) 148 kg (326 lb 4 5 oz)   LMP 09/25/2018   SpO2 95%   BMI 54 30 kg/m²       Physical Exam:  Comfortable awake and alert abdomen soft nontender  Lab Results   Component Value Date    CALCIUM 8 5 09/29/2018     09/29/2018    K 3 0 (L) 09/29/2018    CO2 29 09/29/2018    CL 98 (L) 09/29/2018    BUN 9 09/29/2018    CREATININE 0 64 09/29/2018     Lab Results   Component Value Date    WBC 11 71 (H) 09/27/2018    HGB 14 2 09/27/2018    HCT 42 4 09/27/2018     (H) 09/27/2018     09/27/2018     Lab Results   Component Value Date    ALT 45 09/29/2018    AST 45 09/29/2018    ALKPHOS 157 (H) 09/29/2018     No components found for: [id number]  Lab Results   Component Value Date    LIPASE 588 (H) 09/29/2018     No results found for: IRON, TIBC, FERRITIN  No results found for: INR    Total floor / unit time spent today 20 minutes  Greater than 50% of total time was spent with the patient and / or family counseling and / or coordination of care   A description of the counseling / coordination of care:  20    Nathaniel Magaña MD

## 2018-09-29 NOTE — DISCHARGE SUMMARY
Discharge Summary  Benjaman Kanner 37 y o  female MRN: [id number]  Unit/Bed#: [address]01 Encounter: [id number]    Admission Date:   Admission Orders     Ordered        09/25/18 2305  Inpatient Admission (expected length of stay for this patient is greater than two midnights)  Once               Discharge Date: 09/29/18    Admitting Diagnosis: Acute pancreatitis [K85 90]  Cholelithiasis [K80 20]  Abdominal pain [R10 9]  Ventral hernia [K43 9]  RLQ abdominal pain [R10 31]    HPI:  See history and physical    Procedures Performed: No orders of the defined types were placed in this encounter  Hospital Course:  Patient admitted with acute pancreatitis  The patient has a history of previous bariatric surgery  Patient did admit that she has had some problems with binge drinking particularly on the weekends  It was felt in retrospect this is probably the etiology of her pancreatitis  The patient did have gallstones  An MRCP did not show any stones within the duct  Was not felt she needs urgent cholecystectomy    Patient was able to tolerate a surgical soft diet and was felt to be suitable for discharge  Should be followed by her primary care physician  Again admission regarding alcohol intake as being forever contraindicated in her situation was communicated both her and her  at her bedside  Significant Findings, Care, Treatment and Services Provided:  None    Discharge Diagnosis: Principal Problem:    Acute pancreatitis  Active Problems:    Cholelithiasis    Ventral hernia without obstruction or gangrene    Morbid obesity ([address] )  Resolved Problems:    * No resolved hospital problems  *        Condition at Discharge: stable     Discharge instructions/Information to patient and family:   See after visit summary for information provided to patient and family  Provisions for Follow-Up Care:  See after visit summary for information related to follow-up care and any pertinent home health orders  Disposition: Home    Discharge Medications:  See after visit summary for reconciled discharge medications provided to patient and family  This note has been constructed using a voice recognition system         Les Nick MD

## 2018-09-29 NOTE — NURSING NOTE
Pt telly both brkft and lunch no pain or nausea     BP elevated pt upset because of not sleeping well last night  Wei Brandt Upset over roommate  Crying    Dr Reddy Goods aware and gv one time dose of ativan with pt feeling bettr and bp down    Stable for discharge

## 2018-09-29 NOTE — PROGRESS NOTES
Progress Note - General Surgery   [facility] 37 y o  female MRN: [id number]  Unit/Bed#: [address] [place] [address]01 Encounter: [id number]    Assessment:  Hyperbilirubinemia   Pancreatitis    Plan:  Hyperbilirubinemia - still elevated, mgt per GI  Pancreatitis - lipase elevated, somewhat more discomfort, consider additional monitoring with repeat labs in AM  Still ? Etiology ETOH vs gallstone    Subjective/Objective   Chief Complaint: somewhat more discomfort    Subjective: states she had some more pain overnight after starting reg diet    Objective:     Blood pressure (!) 191/95, pulse 89, temperature 98 4 °F (36 9 °C), temperature source Oral, resp  rate 18, height 5' 5" (1 651 m), weight (!) 148 kg (326 lb 4 5 oz), last menstrual period 09/25/2018, SpO2 95 %  ,Body mass index is 54 3 kg/m²  No intake or output data in the 24 hours ending 09/29/18 1252    Invasive Devices     Peripheral Intravenous Line            Peripheral IV 09/27/18 Left Hand 2 days                Physical Exam: NAD AAOX3  Soft +BS NDmild tenderness in lower abd, non reducible hernia  Lab, Imaging and other studies:I have personally reviewed pertinent lab results      VTE Pharmacologic Prophylaxis: Heparin  VTE Mechanical Prophylaxis: sequential compression device

## 2019-01-30 ENCOUNTER — APPOINTMENT (EMERGENCY)
Dept: CT IMAGING | Facility: HOSPITAL | Age: 44
DRG: 411 | End: 2019-01-30
Payer: COMMERCIAL

## 2019-01-30 ENCOUNTER — HOSPITAL ENCOUNTER (INPATIENT)
Facility: HOSPITAL | Age: 44
LOS: 9 days | Discharge: HOME | DRG: 411 | End: 2019-02-08
Attending: EMERGENCY MEDICINE | Admitting: INTERNAL MEDICINE
Payer: COMMERCIAL

## 2019-01-30 DIAGNOSIS — F10.231 ALCOHOL WITHDRAWAL DELIRIUM, ACUTE, HYPERACTIVE (HCC): ICD-10-CM

## 2019-01-30 DIAGNOSIS — K85.90 ACUTE PANCREATITIS: Primary | ICD-10-CM

## 2019-01-30 DIAGNOSIS — K80.20 CHOLELITHIASIS: ICD-10-CM

## 2019-01-30 DIAGNOSIS — K85.20 ALCOHOL-INDUCED ACUTE PANCREATITIS WITHOUT INFECTION OR NECROSIS: ICD-10-CM

## 2019-01-30 DIAGNOSIS — K81.9 CHOLECYSTITIS: ICD-10-CM

## 2019-01-30 DIAGNOSIS — F10.10 ETOH ABUSE: ICD-10-CM

## 2019-01-30 DIAGNOSIS — F10.10 ALCOHOL ABUSE: ICD-10-CM

## 2019-01-30 DIAGNOSIS — Z87.19 H/O CHOLELITHIASIS: ICD-10-CM

## 2019-01-30 PROBLEM — N39.0 UTI (URINARY TRACT INFECTION): Status: ACTIVE | Noted: 2019-01-30

## 2019-01-30 LAB
ALBUMIN SERPL BCP-MCNC: 3.9 G/DL (ref 3.5–5)
ALP SERPL-CCNC: 106 U/L (ref 46–116)
ALT SERPL W P-5'-P-CCNC: 103 U/L (ref 12–78)
ANION GAP SERPL CALCULATED.3IONS-SCNC: 14 MMOL/L (ref 4–13)
AST SERPL W P-5'-P-CCNC: 117 U/L (ref 5–45)
BACTERIA UR QL AUTO: ABNORMAL /HPF
BASOPHILS # BLD AUTO: 0.04 THOUSANDS/ΜL (ref 0–0.1)
BASOPHILS NFR BLD AUTO: 0 % (ref 0–1)
BILIRUB SERPL-MCNC: 2.6 MG/DL (ref 0.2–1)
BILIRUB UR QL STRIP: ABNORMAL
BUN SERPL-MCNC: 10 MG/DL (ref 5–25)
CALCIUM SERPL-MCNC: 10.1 MG/DL (ref 8.3–10.1)
CHLORIDE SERPL-SCNC: 94 MMOL/L (ref 100–108)
CLARITY UR: CLEAR
CLARITY, POC: CLEAR
CO2 SERPL-SCNC: 29 MMOL/L (ref 21–32)
COLOR UR: ABNORMAL
COLOR, POC: NORMAL
CREAT SERPL-MCNC: 0.94 MG/DL (ref 0.6–1.3)
EOSINOPHIL # BLD AUTO: 0.04 THOUSAND/ΜL (ref 0–0.61)
EOSINOPHIL NFR BLD AUTO: 0 % (ref 0–6)
ERYTHROCYTE [DISTWIDTH] IN BLOOD BY AUTOMATED COUNT: 11.8 % (ref 11.6–15.1)
ETHANOL SERPL-MCNC: <3 MG/DL (ref 0–3)
EXT BILIRUBIN, UA: NORMAL
EXT BLOOD URINE: NORMAL
EXT GLUCOSE, UA: NORMAL
EXT KETONES: 15
EXT NITRITE, UA: NORMAL
EXT PH, UA: 6.5
EXT PREG TEST URINE: NORMAL
EXT PROTEIN, UA: 30
EXT SPECIFIC GRAVITY, UA: 1.02
EXT UROBILINOGEN: 0.2
GFR SERPL CREATININE-BSD FRML MDRD: 75 ML/MIN/1.73SQ M
GLUCOSE SERPL-MCNC: 139 MG/DL (ref 65–140)
GLUCOSE UR STRIP-MCNC: NEGATIVE MG/DL
HCT VFR BLD AUTO: 49.5 % (ref 34.8–46.1)
HGB BLD-MCNC: 17.1 G/DL (ref 11.5–15.4)
HGB UR QL STRIP.AUTO: ABNORMAL
HYALINE CASTS #/AREA URNS LPF: ABNORMAL /LPF
IMM GRANULOCYTES # BLD AUTO: 0.06 THOUSAND/UL (ref 0–0.2)
IMM GRANULOCYTES NFR BLD AUTO: 0 % (ref 0–2)
KETONES UR STRIP-MCNC: ABNORMAL MG/DL
LEUKOCYTE ESTERASE UR QL STRIP: ABNORMAL
LIPASE SERPL-CCNC: 2839 U/L (ref 73–393)
LYMPHOCYTES # BLD AUTO: 0.91 THOUSANDS/ΜL (ref 0.6–4.47)
LYMPHOCYTES NFR BLD AUTO: 6 % (ref 14–44)
MCH RBC QN AUTO: 35.3 PG (ref 26.8–34.3)
MCHC RBC AUTO-ENTMCNC: 34.5 G/DL (ref 31.4–37.4)
MCV RBC AUTO: 102 FL (ref 82–98)
MONOCYTES # BLD AUTO: 0.98 THOUSAND/ΜL (ref 0.17–1.22)
MONOCYTES NFR BLD AUTO: 7 % (ref 4–12)
NEUTROPHILS # BLD AUTO: 13.1 THOUSANDS/ΜL (ref 1.85–7.62)
NEUTS SEG NFR BLD AUTO: 87 % (ref 43–75)
NITRITE UR QL STRIP: POSITIVE
NON-SQ EPI CELLS URNS QL MICRO: ABNORMAL /HPF
NRBC BLD AUTO-RTO: 0 /100 WBCS
PH UR STRIP.AUTO: 6.5 [PH] (ref 4.5–8)
PLATELET # BLD AUTO: 204 THOUSANDS/UL (ref 149–390)
PMV BLD AUTO: 11.1 FL (ref 8.9–12.7)
POTASSIUM SERPL-SCNC: 4.7 MMOL/L (ref 3.5–5.3)
PROT SERPL-MCNC: 8.6 G/DL (ref 6.4–8.2)
PROT UR STRIP-MCNC: ABNORMAL MG/DL
RBC # BLD AUTO: 4.85 MILLION/UL (ref 3.81–5.12)
RBC #/AREA URNS AUTO: ABNORMAL /HPF
SODIUM SERPL-SCNC: 137 MMOL/L (ref 136–145)
SP GR UR STRIP.AUTO: 1.02 (ref 1–1.03)
UROBILINOGEN UR QL STRIP.AUTO: 1 E.U./DL
WBC # BLD AUTO: 15.13 THOUSAND/UL (ref 4.31–10.16)
WBC # BLD EST: NORMAL 10*3/UL
WBC #/AREA URNS AUTO: ABNORMAL /HPF

## 2019-01-30 PROCEDURE — 80053 COMPREHEN METABOLIC PANEL: CPT | Performed by: EMERGENCY MEDICINE

## 2019-01-30 PROCEDURE — 99223 1ST HOSP IP/OBS HIGH 75: CPT | Performed by: INTERNAL MEDICINE

## 2019-01-30 PROCEDURE — 94762 N-INVAS EAR/PLS OXIMTRY CONT: CPT

## 2019-01-30 PROCEDURE — 96361 HYDRATE IV INFUSION ADD-ON: CPT

## 2019-01-30 PROCEDURE — 81025 URINE PREGNANCY TEST: CPT | Performed by: EMERGENCY MEDICINE

## 2019-01-30 PROCEDURE — 99285 EMERGENCY DEPT VISIT HI MDM: CPT

## 2019-01-30 PROCEDURE — 36415 COLL VENOUS BLD VENIPUNCTURE: CPT | Performed by: EMERGENCY MEDICINE

## 2019-01-30 PROCEDURE — 85025 COMPLETE CBC W/AUTO DIFF WBC: CPT | Performed by: EMERGENCY MEDICINE

## 2019-01-30 PROCEDURE — 80320 DRUG SCREEN QUANTALCOHOLS: CPT | Performed by: EMERGENCY MEDICINE

## 2019-01-30 PROCEDURE — 83690 ASSAY OF LIPASE: CPT | Performed by: EMERGENCY MEDICINE

## 2019-01-30 PROCEDURE — 96374 THER/PROPH/DIAG INJ IV PUSH: CPT

## 2019-01-30 PROCEDURE — 81001 URINALYSIS AUTO W/SCOPE: CPT | Performed by: EMERGENCY MEDICINE

## 2019-01-30 PROCEDURE — 96375 TX/PRO/DX INJ NEW DRUG ADDON: CPT

## 2019-01-30 PROCEDURE — 74177 CT ABD & PELVIS W/CONTRAST: CPT

## 2019-01-30 RX ORDER — SODIUM CHLORIDE 9 MG/ML
1000 INJECTION, SOLUTION INTRAVENOUS ONCE
Status: COMPLETED | OUTPATIENT
Start: 2019-01-30 | End: 2019-01-30

## 2019-01-30 RX ORDER — ONDANSETRON 2 MG/ML
4 INJECTION INTRAMUSCULAR; INTRAVENOUS ONCE
Status: COMPLETED | OUTPATIENT
Start: 2019-01-30 | End: 2019-01-30

## 2019-01-30 RX ORDER — HYDROMORPHONE HCL/PF 1 MG/ML
1 SYRINGE (ML) INJECTION
Status: DISCONTINUED | OUTPATIENT
Start: 2019-01-30 | End: 2019-01-31

## 2019-01-30 RX ORDER — MORPHINE SULFATE 4 MG/ML
4 INJECTION, SOLUTION INTRAMUSCULAR; INTRAVENOUS ONCE
Status: COMPLETED | OUTPATIENT
Start: 2019-01-30 | End: 2019-01-30

## 2019-01-30 RX ORDER — KETOROLAC TROMETHAMINE 30 MG/ML
30 INJECTION, SOLUTION INTRAMUSCULAR; INTRAVENOUS ONCE
Status: COMPLETED | OUTPATIENT
Start: 2019-01-30 | End: 2019-01-30

## 2019-01-30 RX ORDER — HYDROMORPHONE HCL/PF 1 MG/ML
1 SYRINGE (ML) INJECTION ONCE
Status: COMPLETED | OUTPATIENT
Start: 2019-01-30 | End: 2019-01-30

## 2019-01-30 RX ORDER — AZTREONAM 1 G/1
1000 INJECTION, POWDER, LYOPHILIZED, FOR SOLUTION INTRAMUSCULAR; INTRAVENOUS EVERY 8 HOURS
Status: DISCONTINUED | OUTPATIENT
Start: 2019-01-30 | End: 2019-01-30

## 2019-01-30 RX ORDER — HEPARIN SODIUM 5000 [USP'U]/ML
5000 INJECTION, SOLUTION INTRAVENOUS; SUBCUTANEOUS ONCE
Status: COMPLETED | OUTPATIENT
Start: 2019-01-30 | End: 2019-01-30

## 2019-01-30 RX ORDER — ONDANSETRON 2 MG/ML
4 INJECTION INTRAMUSCULAR; INTRAVENOUS EVERY 4 HOURS PRN
Status: DISCONTINUED | OUTPATIENT
Start: 2019-01-30 | End: 2019-02-08 | Stop reason: HOSPADM

## 2019-01-30 RX ORDER — SODIUM CHLORIDE 9 MG/ML
75 INJECTION, SOLUTION INTRAVENOUS CONTINUOUS
Status: DISCONTINUED | OUTPATIENT
Start: 2019-01-30 | End: 2019-02-02

## 2019-01-30 RX ADMIN — IOHEXOL 100 ML: 350 INJECTION, SOLUTION INTRAVENOUS at 09:50

## 2019-01-30 RX ADMIN — SODIUM CHLORIDE 250 ML/HR: 0.9 INJECTION, SOLUTION INTRAVENOUS at 13:34

## 2019-01-30 RX ADMIN — Medication 1000 MG: at 21:31

## 2019-01-30 RX ADMIN — MORPHINE SULFATE 4 MG: 4 INJECTION INTRAVENOUS at 10:35

## 2019-01-30 RX ADMIN — HYDROMORPHONE HYDROCHLORIDE 1 MG: 1 INJECTION, SOLUTION INTRAMUSCULAR; INTRAVENOUS; SUBCUTANEOUS at 15:42

## 2019-01-30 RX ADMIN — SODIUM CHLORIDE 1000 ML/HR: 0.9 INJECTION, SOLUTION INTRAVENOUS at 09:16

## 2019-01-30 RX ADMIN — HYDROMORPHONE HYDROCHLORIDE 1 MG: 1 INJECTION, SOLUTION INTRAMUSCULAR; INTRAVENOUS; SUBCUTANEOUS at 21:30

## 2019-01-30 RX ADMIN — HYDROMORPHONE HYDROCHLORIDE 1 MG: 1 INJECTION, SOLUTION INTRAMUSCULAR; INTRAVENOUS; SUBCUTANEOUS at 18:51

## 2019-01-30 RX ADMIN — Medication 1000 MG: at 13:26

## 2019-01-30 RX ADMIN — ONDANSETRON 4 MG: 2 INJECTION INTRAMUSCULAR; INTRAVENOUS at 09:16

## 2019-01-30 RX ADMIN — HEPARIN SODIUM 5000 UNITS: 5000 INJECTION INTRAVENOUS; SUBCUTANEOUS at 13:30

## 2019-01-30 RX ADMIN — FOLIC ACID: 5 INJECTION, SOLUTION INTRAMUSCULAR; INTRAVENOUS; SUBCUTANEOUS at 15:42

## 2019-01-30 RX ADMIN — KETOROLAC TROMETHAMINE 30 MG: 30 INJECTION, SOLUTION INTRAMUSCULAR; INTRAVENOUS at 09:18

## 2019-01-30 RX ADMIN — HYDROMORPHONE HYDROCHLORIDE 1 MG: 1 INJECTION, SOLUTION INTRAMUSCULAR; INTRAVENOUS; SUBCUTANEOUS at 12:24

## 2019-01-30 NOTE — ED PROVIDER NOTES
History  Chief Complaint   Patient presents with    Abdominal Pain     Patient presents to the ER stating she started with RUQ abdominal pain yesterday, has history of gall stones     Patient complains of right upper abd sharp pain radiate to center and down with associated nausea, dry heaves, dysuria  Decreased appetite as well  Symptoms have been gradual worsening over last 2 days  Hx of gallstones and pancreatitis diagnosed September 2018  Admits to subjective fever chills  Drinks alcohol daily  None       Past Medical History:   Diagnosis Date    Asthma     Pancreatitis        Past Surgical History:   Procedure Laterality Date    GASTRIC BYPASS         History reviewed  No pertinent family history  I have reviewed and agree with the history as documented  Social History   Substance Use Topics    Smoking status: Never Smoker    Smokeless tobacco: Never Used    Alcohol use 3 0 oz/week     5 Shots of liquor per week        Review of Systems   All other systems reviewed and are negative  Physical Exam  Physical Exam   Constitutional: She appears well-developed and well-nourished  HENT:   Mouth/Throat: Oropharynx is clear and moist    Eyes: Pupils are equal, round, and reactive to light  Conjunctivae and EOM are normal    Neck: Normal range of motion  Neck supple  No spinous process tenderness present  Cardiovascular: Normal rate, regular rhythm, normal heart sounds and intact distal pulses  Pulmonary/Chest: Effort normal and breath sounds normal  No respiratory distress  She has no wheezes  Abdominal: Soft  Bowel sounds are normal  She exhibits no distension  There is tenderness in the right upper quadrant, epigastric area and left upper quadrant  There is no rebound and no guarding  Musculoskeletal: Normal range of motion  Neurological: She is alert  She has normal strength  No sensory deficit  GCS eye subscore is 4  GCS verbal subscore is 5  GCS motor subscore is 6  Skin: Skin is warm and dry  No rash noted  Psychiatric: She has a normal mood and affect  Nursing note and vitals reviewed        Vital Signs  ED Triage Vitals   Temperature Pulse Respirations Blood Pressure SpO2   01/30/19 1027 01/30/19 0930 01/30/19 0930 01/30/19 0845 01/30/19 0930   97 6 °F (36 4 °C) 72 20 (!) 143/102 92 %      Temp Source Heart Rate Source Patient Position - Orthostatic VS BP Location FiO2 (%)   01/30/19 1027 01/30/19 0930 01/30/19 0845 01/30/19 0845 --   Tympanic Monitor Lying Right arm       Pain Score       01/30/19 0832       9           Vitals:    01/30/19 1100 01/30/19 1135 01/30/19 1516 01/30/19 1900   BP: 162/89 145/81 141/83 131/74   Pulse: 79 88 (!) 109 101   Patient Position - Orthostatic VS: Lying Lying Lying Lying       Visual Acuity      ED Medications  Medications   HYDROmorphone (DILAUDID) injection 1 mg (1 mg Intravenous Given 1/30/19 1851)   sodium chloride 0 9 % infusion (250 mL/hr Intravenous New Bag 1/30/19 1334)   ondansetron (ZOFRAN) injection 4 mg (not administered)   aztreonam (AZACTAM) 1,000 mg in sodium chloride 0 9 % 50 mL IVPB (1,000 mg Intravenous New Bag 1/30/19 1326)   thiamine (VITAMIN B1) 562 mg, folic acid 1 mg in sodium chloride 0 9 % 50 mL IVPB ( Intravenous New Bag 1/30/19 1542)   ondansetron (ZOFRAN) injection 4 mg (4 mg Intravenous Given 1/30/19 0916)   sodium chloride 0 9 % infusion (0 mL/hr Intravenous Stopped 1/30/19 1117)   ketorolac (TORADOL) injection 30 mg (30 mg Intravenous Given 1/30/19 0918)   iohexol (OMNIPAQUE) 350 MG/ML injection (MULTI-DOSE) 100 mL (100 mL Intravenous Given 1/30/19 0950)   morphine (PF) 4 mg/mL injection 4 mg (4 mg Intravenous Given 1/30/19 1035)   HYDROmorphone (DILAUDID) injection 1 mg (1 mg Intravenous Given 1/30/19 1224)   heparin (porcine) subcutaneous injection 5,000 Units (5,000 Units Subcutaneous Given 1/30/19 1330)       Diagnostic Studies  Results Reviewed     Procedure Component Value Units Date/Time Ethanol [[id number]]  (Normal) Collected:  01/30/19 1059    Lab Status:  Final result Specimen:  Blood from Arm, Left Updated:  01/30/19 1132     Ethanol Lvl <3 mg/dL     Urine Microscopic [[id number]]  (Abnormal) Collected:  01/30/19 0940    Lab Status:  Final result Specimen:  Urine from Urine, Clean Catch Updated:  01/30/19 1010     RBC, UA 1-2 (A) /hpf      WBC, UA 2-4 (A) /hpf      Epithelial Cells Moderate (A) /hpf      Bacteria, UA Occasional /hpf      Hyaline Casts, UA 20-30 (A) /lpf     UA w Reflex to Microscopic w Reflex to Culture [[id number]]  (Abnormal) Collected:  01/30/19 0940    Lab Status:  Final result Specimen:  Urine from Urine, Clean Catch Updated:  01/30/19 0949     Color, UA Orange     Clarity, UA Clear     Specific Gravity, UA 1 025     pH, UA 6 5     Leukocytes, UA Trace (A)     Nitrite, UA Positive (A)     Protein, UA 30 (1+) (A) mg/dl      Glucose, UA Negative mg/dl      Ketones, UA 15 (1+) (A) mg/dl      Urobilinogen, UA 1 0 E U /dl      Bilirubin, UA Interference- unable to analyze (A)     Blood, UA Small (A)    Lipase [[id number]]  (Abnormal) Collected:  01/30/19 0908    Lab Status:  Final result Specimen:  Blood from Arm, Left Updated:  01/30/19 0939     Lipase 2,839 (H) u/L     Comprehensive metabolic panel [[id number]]  (Abnormal) Collected:  01/30/19 0908    Lab Status:  Final result Specimen:  Blood from Arm, Left Updated:  01/30/19 0934     Sodium 137 mmol/L      Potassium 4 7 mmol/L      Chloride 94 (L) mmol/L      CO2 29 mmol/L      ANION GAP 14 (H) mmol/L      BUN 10 mg/dL      Creatinine 0 94 mg/dL      Glucose 139 mg/dL      Calcium 10 1 mg/dL       (H) U/L       (H) U/L      Alkaline Phosphatase 106 U/L      Total Protein 8 6 (H) g/dL      Albumin 3 9 g/dL      Total Bilirubin 2 60 (H) mg/dL      eGFR 75 ml/min/1 73sq m     Narrative:         National Kidney Disease Education Program recommendations are as follows:  GFR calculation is accurate only with a steady state creatinine  Chronic Kidney disease less than 60 ml/min/1 73 sq  meters  Kidney failure less than 15 ml/min/1 73 sq  meters  POCT urinalysis dipstick [[id number]]  (Normal) Resulted:  01/30/19 0925    Lab Status:  Final result Specimen:  Urine from Urine, Other Updated:  01/30/19 0931     Color, UA FRANCIS     Clarity, UA CLEAR     Glucose, UA (Ref: Negative) NEG     Bilirubin, UA (Ref: Negative) SMALL     Ketones, UA (Ref: Negative) 15     Spec Grav, UA (Ref:1 003-1 030) 1 025     Blood, UA (Ref: Negative) MOD     pH, UA (Ref: 4 5-8 0) 6 5     Protein, UA (Ref: Negative) 30     Urobilinogen, UA (Ref: 0 2- 1 0) 0 2      Leukocytes, UA (Ref: Negative) TRACE     Nitrite, UA (Ref: Negative) NEG    POCT pregnancy, urine [[id number]]  (Normal) Resulted:  01/30/19 0930    Lab Status:  Final result Updated:  01/30/19 0930     EXT PREG TEST UR (Ref: Negative) NEG    CBC and differential [[id number]]  (Abnormal) Collected:  01/30/19 0908    Lab Status:  Final result Specimen:  Blood from Arm, Left Updated:  01/30/19 0915     WBC 15 13 (H) Thousand/uL      RBC 4 85 Million/uL      Hemoglobin 17 1 (H) g/dL      Hematocrit 49 5 (H) %       (H) fL      MCH 35 3 (H) pg      MCHC 34 5 g/dL      RDW 11 8 %      MPV 11 1 fL      Platelets 406 Thousands/uL      nRBC 0 /100 WBCs      Neutrophils Relative 87 (H) %      Immat GRANS % 0 %      Lymphocytes Relative 6 (L) %      Monocytes Relative 7 %      Eosinophils Relative 0 %      Basophils Relative 0 %      Neutrophils Absolute 13 10 (H) Thousands/µL      Immature Grans Absolute 0 06 Thousand/uL      Lymphocytes Absolute 0 91 Thousands/µL      Monocytes Absolute 0 98 Thousand/µL      Eosinophils Absolute 0 04 Thousand/µL      Basophils Absolute 0 04 Thousands/µL                  CT abdomen pelvis with contrast   Final Result by Sherrell Murdock MD (01/30 1022)      1  Acute pancreatitis  No organized pancreatic or peripancreatic fluid collection     2   Cholelithiasis without evidence of cholecystitis  3   Stable hepatomegaly with steatosis  The study was marked in [facility] for immediate notification  Workstation performed: [id number]                    Procedures  Procedures       Phone Contacts  ED Phone Contact    ED Course  ED Course as of Jan 30 2008 Wed Jan 30, 2019   1034 Texted Griffin for admission                          Initial Sepsis Screening     [address] Name 01/30/19 1241                Is the patient's history suggestive of a new or worsening infection? No  -CE        Suspected source of infection  --        Are two or more of the following signs & symptoms of infection both present and new to the patient? No  -CE        Indicate SIRS criteria  --        If the answer is yes to both questions, suspicion of sepsis is present  --        If severe sepsis is present AND tissue hypoperfusion perists in the hour after fluid resuscitation or lactate > 4, the patient meets criteria for SEPTIC SHOCK  --        Are any of the following organ dysfunction criteria present within 6 hours of suspected infection and SIRS criteria that are NOT considered to be chronic conditions?   --        Organ dysfunction  --        Date of presentation of severe sepsis  --        Time of presentation of severe sepsis  --        Tissue hypoperfusion persists in the hour after crystalloid fluid administration, evidenced, by either:  --        Was hypotension present within one hour of the conclusion of crystalloid fluid administration?  --        Date of presentation of septic shock  --        Time of presentation of septic shock  --          User Key  (r) = Recorded By, (t) = Taken By, (c) = Cosigned By    [address] Name Provider Yoav Padron MD Physician                  MDM  Number of Diagnoses or Management Options  Acute pancreatitis: new and requires workup     Amount and/or Complexity of Data Reviewed  Clinical lab tests: ordered and reviewed  Tests in the radiology section of CPT®: ordered and reviewed  Discuss the patient with other providers: yes    Patient Progress  Patient progress: improved      Disposition  Final diagnoses:   Acute pancreatitis     Time reflects when diagnosis was documented in both MDM as applicable and the Disposition within this note     Time User Action Codes Description Comment    1/30/2019 10:47 AM Martine Santos Add [K85 90] Acute pancreatitis     1/30/2019 12:36 PM Suzanne Blue Add [F10 10] ETOH abuse     1/30/2019 12:40 PM Suzanne Blue Add [K80 20] Cholelithiasis       ED Disposition     ED Disposition Condition Date/Time Comment    Admit  Wed Jan 30, 2019 10:47 AM Case was discussed with Juan Martin* and the patient's admission status was agreed to be Admission Status: inpatient status to the service of Dr Reilly Free   Follow-up Information    None         There are no discharge medications for this patient  No discharge procedures on file      ED Provider  Electronically Signed by           Georgiana Campos DO  01/30/19 2009

## 2019-01-30 NOTE — ASSESSMENT & PLAN NOTE
P r n  Pain control  IV resuscitation with normal saline 250 cc an hour  Supportive care including p r n   Antiemetics  Gastroenterology consult due to obstructive pattern LFTs  Trend lipase  Monitor clinically  NPO

## 2019-01-30 NOTE — ASSESSMENT & PLAN NOTE
Obtain urine culture  Monitor for leukocytosis with CBC in the a m   IV aztreonam to treat empirically based on multiple allergies

## 2019-01-30 NOTE — ASSESSMENT & PLAN NOTE
IV thiamine and folate supplementation daily  Once tolerating p o  Will change this to p o   Route  Cessation counseled  Discussion with the patient reveals that this may be dual diagnosis an attempt to self treat with alcohol as results psychiatry consult has been placed for evaluation   [facility] protocol

## 2019-01-30 NOTE — H&P
H&P- Magnus Antes 1975, 37 y o  female MRN: [id number]    Unit/Bed#: [address] [place] [address] Encounter: [id number]    Primary Care Provider: Abner Garcia MD   Date and time admitted to hospital: 1/30/2019  8:29 AM        * Acute pancreatitis   Assessment & Plan    P r n  Pain control  IV resuscitation with normal saline 250 cc an hour  Supportive care including p r n  Antiemetics  Gastroenterology consult due to obstructive pattern LFTs  Trend lipase  Monitor clinically  NPO     UTI (urinary tract infection)   Assessment & Plan    Obtain urine culture  Monitor for leukocytosis with CBC in the a m   IV aztreonam to treat empirically based on multiple allergies     Alcohol abuse   Assessment & Plan    IV thiamine and folate supplementation daily  Once tolerating p o  Will change this to p o  Route  Cessation counseled  Discussion with the patient reveals that this may be dual diagnosis an attempt to self treat with alcohol as results psychiatry consult has been placed for evaluation   CIWA protocol     Morbid obesity [facility])   Assessment & Plan    Lifestyle modifications recommended  Nutrition consult     Cholelithiasis   Assessment & Plan    Gastroenterology consulted  Will monitor LFTs  Should the patient require ERCP or MRCP [facility]   To make this determination with the assistance of Gastroenterology       VTE Prophylaxis: Heparin  / sequential compression device   Code Status: full code  POLST: There is no POLST form on file for this patient (pre-hospital)  Discussion with family:  Bedside    Anticipated Length of Stay:  Patient will be admitted on an Inpatient basis with an anticipated length of stay of  more than 2 midnights  Justification for Hospital Stay:  Pancreatitis    Total Time for Visit, including Counseling / Coordination of Care: 30 minutes    Greater than 50% of this total time spent on direct patient counseling and coordination of care     Chief Complaint:   Abdominal pain    History of Present Illness:    Kimber Zhong is a 37 y o  female past medical history in this case significant for alcohol abuse with binge pattern of drinking her drink of choice is vodka she drinks 1/5 of vodka every 2 days when she is on a binge  Patient started on a binge this Sunday developed abdominal pain yesterday right upper quadrant and epigastric consistent with a previous attack of gallstone pancreatitis  Over the past 24 hours the patient has had worsened abdominal pain with associated nausea but without vomiting  She has had minimal p  O  Intake and has not moved her bowels since yesterday before the pain began  She also has a 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 distant history of a Adilson-en-Y  Patient denies fevers chills she does note increased urinary frequency over the past 2 days however recently she has had minimal urine output  She denies chest pain shortness of breath and denies fevers chills  Review of Systems:    Review of Systems   Constitutional: Negative  Negative for activity change, appetite change, chills, diaphoresis, fatigue, fever and unexpected weight change  HENT: Negative  Negative for congestion, dental problem, facial swelling, hearing loss, rhinorrhea, sinus pain, sinus pressure, sore throat and tinnitus  Eyes: Negative  Negative for photophobia, pain, discharge and visual disturbance  Respiratory: Negative  Negative for cough, chest tightness, shortness of breath, wheezing and stridor  Cardiovascular: Negative  Negative for chest pain, palpitations and leg swelling  Gastrointestinal: Positive for abdominal pain and nausea  Negative for abdominal distention, blood in stool, constipation, diarrhea and vomiting  Endocrine: Negative  Negative for cold intolerance, heat intolerance and polyuria  Genitourinary: Positive for frequency  Negative for difficulty urinating, dysuria, flank pain, hematuria and urgency     Musculoskeletal: Negative  Negative for arthralgias, gait problem, joint swelling, myalgias and neck stiffness  Skin: Negative  Negative for color change, pallor, rash and wound  Allergic/Immunologic: Negative  Negative for immunocompromised state  Neurological: Negative  Negative for dizziness, seizures, syncope, weakness, light-headedness, numbness and headaches  Hematological: Negative  Negative for adenopathy  Does not bruise/bleed easily  Psychiatric/Behavioral: Negative  Negative for confusion and hallucinations  The patient is not nervous/anxious  Past Medical and Surgical History:     Past Medical History:   Diagnosis Date    Asthma     Pancreatitis        Past Surgical History:   Procedure Laterality Date    GASTRIC BYPASS         Meds/Allergies:    Prior to Admission medications    Medication Sig Start Date End Date Taking? Authorizing Provider   pantoprazole (PROTONIX) 40 mg tablet Take 1 tablet (40 mg total) by mouth daily 9/29/18 1/30/19  Ivy Mars MD     I have reviewed home medications using allscripts  Allergies:    Allergies   Allergen Reactions    Augmentin [Amoxicillin-Pot Clavulanate]     Ciprofloxacin     Doxycycline     Latex     Penicillins        Social History:     Marital Status: /Civil Union   Occupation:  Works in medical billing  Patient Pre-hospital Living Situation:  [address] with her   Patient Pre-hospital Level of Mobility:  Ambulatory without assist device  Patient Pre-hospital Diet Restrictions:  None  Substance Use History:   History   Alcohol Use    3 0 oz/week    5 Shots of liquor per week     History   Smoking Status    Never Smoker   Smokeless Tobacco    Never Used     History   Drug Use No       Family History:    non-contributory    Physical Exam:     Vitals:   Blood Pressure: 145/81 (01/30/19 1135)  Pulse: 88 (01/30/19 1135)  Temperature: 98 6 °F (37 °C) (01/30/19 1135)  Temp Source: Oral (01/30/19 1135)  Respirations: 18 (01/30/19 1135)  Height: 5' 6" (167 6 cm) (01/30/19 1135)  Weight - Scale: (!) 141 kg (310 lb 13 6 oz) (01/30/19 1135)  SpO2: 94 % (01/30/19 1141)    Physical Exam   Constitutional: She is oriented to person, place, and time  She appears well-developed and well-nourished  No distress  HENT:   Head: Normocephalic and atraumatic  Right Ear: External ear normal    Left Ear: External ear normal    Nose: Nose normal    Eyes: Conjunctivae are normal  Right eye exhibits no discharge  Left eye exhibits no discharge  No scleral icterus  Neck: Normal range of motion  Neck supple  No JVD present  No tracheal deviation present  No thyromegaly present  Cardiovascular: Normal rate, regular rhythm, normal heart sounds and intact distal pulses  Exam reveals no gallop and no friction rub  No murmur heard  Pulmonary/Chest: Effort normal and breath sounds normal  No stridor  No respiratory distress  She has no wheezes  She has no rales  Abdominal: Soft  She exhibits no distension  There is tenderness (Right upper quadrant epigastrium tender to light palpation)  There is no rebound and no guarding  Hypoactive bowel sounds   Musculoskeletal: Normal range of motion  She exhibits no edema, tenderness or deformity  Neurological: She is alert and oriented to person, place, and time  She exhibits normal muscle tone  Coordination normal    Skin: Skin is warm and dry  No rash noted  She is not diaphoretic  No erythema  No pallor  Psychiatric: She has a normal mood and affect  Her behavior is normal  Judgment and thought content normal    Nursing note and vitals reviewed  Additional Data:     Lab Results: I have personally reviewed pertinent reports          Results from last 7 days  Lab Units 01/30/19  0908   WBC Thousand/uL 15 13*   HEMOGLOBIN g/dL 17 1*   HEMATOCRIT % 49 5*   PLATELETS Thousands/uL 204   NEUTROS PCT % 87*   LYMPHS PCT % 6*   MONOS PCT % 7   EOS PCT % 0       Results from last 7 days  Lab Units 01/30/19  0908   SODIUM mmol/L 137   POTASSIUM mmol/L 4 7   CHLORIDE mmol/L 94*   CO2 mmol/L 29   BUN mg/dL 10   CREATININE mg/dL 0 94   ANION GAP mmol/L 14*   CALCIUM mg/dL 10 1   ALBUMIN g/dL 3 9   TOTAL BILIRUBIN mg/dL 2 60*   ALK PHOS U/L 106   ALT U/L 103*   AST U/L 117*   GLUCOSE RANDOM mg/dL 139                       Imaging: I have personally reviewed pertinent reports  CT abdomen pelvis with contrast   Final Result by Shankar Holloway MD (01/30 1022)      1  Acute pancreatitis  No organized pancreatic or peripancreatic fluid collection  2   Cholelithiasis without evidence of cholecystitis  3   Stable hepatomegaly with steatosis  The study was marked in [facility] for immediate notification  Workstation performed: [id number]             EKG, Pathology, and Other Studies Reviewed on Admission:   · EKG:      Allscripts / Epic Records Reviewed: Yes     ** Please Note: This note has been constructed using a voice recognition system   **

## 2019-01-30 NOTE — ASSESSMENT & PLAN NOTE
Gastroenterology consulted  Will monitor LFTs  Should the patient require ERCP or MRCP both of these would require transfer to [place]  Await LFTs in the a    To make this determination with the assistance of Gastroenterology

## 2019-01-30 NOTE — CONSULTS
Consultation - GI   Ann Marie Roger 37 y o  female MRN: [id number]  Unit/Bed#: [address] [place] [address] [id number] Encounter: [id number]    Consults  ASSESSMENT and PLAN patient is a very pleasant 49-year-old female admitted with abdominal pain and nausea  Marked elevation of her lipase with CT findings of acute pancreatitis  Her liver enzymes are also mildly elevated raising the possibility of a common bile duct stone versus alcoholic pancreatitis  I would recommend treating the pancreatitis with high rate IV fluids 300 cc an hour of saline to lactated Ringer's  Serial liver enzymes and pancreatic enzymes  She should have an MRCP to exclude a common duct stone  If this is negative I would say this is more likely acute alcoholic pancreatitis and she should not drink alcohol  If the MRCP shows a common duct stone she will require tertiary care referral for removal perhaps to Dr Lazaro duque and Srinivas, in light of her previous gastric bypass Adilson-en-Y  No new Assessment & Plan notes have been filed under this hospital service since the last note was generated  Service: Gastroenterology      Chief Complaint   Patient presents with    Abdominal Pain     Patient presents to the ER stating she started with RUQ abdominal pain yesterday, has history of gall stones       HPI Ann Marie Roger is a 37y o  year old female who presents with worsening abdominal pain for the last several days  Some nausea but no vomiting  The pain itself is reminiscent of her previous pancreatitis from late 2018  She does drink alcohol she says 2-3 drinks a day more on the weekends  She is not sure that makes it worse eating seems to make the pain worse  No significant weight loss or lower GI symptoms  Past Medical History:   Diagnosis Date    Asthma     Pancreatitis        Past Surgical History:   Procedure Laterality Date    GASTRIC BYPASS         No prescriptions prior to admission         Allergies   Allergen Reactions    Augmentin [Amoxicillin-Pot Clavulanate]     Ciprofloxacin     Doxycycline     Latex     Penicillins        Social History     History reviewed  No pertinent family history  10 Point Review of Systems - negative except as outlined in the HPI    /81 (BP Location: Right arm)   Pulse 88   Temp 98 6 °F (37 °C) (Oral)   Resp 18   Ht 5' 6" (1 676 m)   Wt (!) 141 kg (310 lb 13 6 oz)   LMP 01/16/2019   SpO2 94%   BMI 50 17 kg/m²     PHYSICALEXAM  General appearance: alert, appears stated age and cooperative  Head: Normocephalic, without obvious abnormality, atraumatic  Lungs: clear to auscultation bilaterally  Heart: regular rate and rhythm,  no murmur  Abdomen: soft, non-tender; bowel sounds normal; no masses,  no organomegaly  Extremities: extremities normal, atraumatic, no cyanosis or edema  Neurologic: Grossly normal    Lab Results   Component Value Date    CALCIUM 10 1 01/30/2019    K 4 7 01/30/2019    CO2 29 01/30/2019    CL 94 (L) 01/30/2019    BUN 10 01/30/2019    CREATININE 0 94 01/30/2019     Lab Results   Component Value Date    WBC 15 13 (H) 01/30/2019    HGB 17 1 (H) 01/30/2019    HCT 49 5 (H) 01/30/2019     (H) 01/30/2019     01/30/2019     Lab Results   Component Value Date     (H) 01/30/2019     (H) 01/30/2019    ALKPHOS 106 01/30/2019     Lab Results   Component Value Date    AMYLASE 33 09/27/2018     Lab Results   Component Value Date    LIPASE 2,839 (H) 01/30/2019     No results found for: IRON, TIBC, FERRITIN  No results found for: INR    Imaging Studies: I have personally reviewed pertinent reports  EKG, Pathology, and Other Studies: I have personally reviewed pertinent reports

## 2019-01-31 ENCOUNTER — APPOINTMENT (INPATIENT)
Dept: MRI IMAGING | Facility: HOSPITAL | Age: 44
DRG: 411 | End: 2019-01-31
Payer: COMMERCIAL

## 2019-01-31 LAB
ALBUMIN SERPL BCP-MCNC: 3.1 G/DL (ref 3.5–5)
ALP SERPL-CCNC: 86 U/L (ref 46–116)
ALT SERPL W P-5'-P-CCNC: 58 U/L (ref 12–78)
ANION GAP SERPL CALCULATED.3IONS-SCNC: 10 MMOL/L (ref 4–13)
AST SERPL W P-5'-P-CCNC: 41 U/L (ref 5–45)
BASOPHILS # BLD AUTO: 0.05 THOUSANDS/ΜL (ref 0–0.1)
BASOPHILS NFR BLD AUTO: 0 % (ref 0–1)
BILIRUB SERPL-MCNC: 1.6 MG/DL (ref 0.2–1)
BUN SERPL-MCNC: 10 MG/DL (ref 5–25)
CALCIUM SERPL-MCNC: 7.9 MG/DL (ref 8.3–10.1)
CHLORIDE SERPL-SCNC: 100 MMOL/L (ref 100–108)
CO2 SERPL-SCNC: 30 MMOL/L (ref 21–32)
CREAT SERPL-MCNC: 0.92 MG/DL (ref 0.6–1.3)
EOSINOPHIL # BLD AUTO: 0.06 THOUSAND/ΜL (ref 0–0.61)
EOSINOPHIL NFR BLD AUTO: 0 % (ref 0–6)
ERYTHROCYTE [DISTWIDTH] IN BLOOD BY AUTOMATED COUNT: 11.8 % (ref 11.6–15.1)
GFR SERPL CREATININE-BSD FRML MDRD: 77 ML/MIN/1.73SQ M
GLUCOSE SERPL-MCNC: 109 MG/DL (ref 65–140)
HCT VFR BLD AUTO: 43.4 % (ref 34.8–46.1)
HGB BLD-MCNC: 14.2 G/DL (ref 11.5–15.4)
IMM GRANULOCYTES # BLD AUTO: 0.1 THOUSAND/UL (ref 0–0.2)
IMM GRANULOCYTES NFR BLD AUTO: 1 % (ref 0–2)
LIPASE SERPL-CCNC: 801 U/L (ref 73–393)
LYMPHOCYTES # BLD AUTO: 0.71 THOUSANDS/ΜL (ref 0.6–4.47)
LYMPHOCYTES NFR BLD AUTO: 5 % (ref 14–44)
MCH RBC QN AUTO: 35 PG (ref 26.8–34.3)
MCHC RBC AUTO-ENTMCNC: 32.7 G/DL (ref 31.4–37.4)
MCV RBC AUTO: 107 FL (ref 82–98)
MONOCYTES # BLD AUTO: 0.8 THOUSAND/ΜL (ref 0.17–1.22)
MONOCYTES NFR BLD AUTO: 6 % (ref 4–12)
NEUTROPHILS # BLD AUTO: 12.71 THOUSANDS/ΜL (ref 1.85–7.62)
NEUTS SEG NFR BLD AUTO: 88 % (ref 43–75)
NRBC BLD AUTO-RTO: 0 /100 WBCS
PLATELET # BLD AUTO: 143 THOUSANDS/UL (ref 149–390)
PMV BLD AUTO: 11 FL (ref 8.9–12.7)
POTASSIUM SERPL-SCNC: 3.3 MMOL/L (ref 3.5–5.3)
PROT SERPL-MCNC: 7.3 G/DL (ref 6.4–8.2)
RBC # BLD AUTO: 4.06 MILLION/UL (ref 3.81–5.12)
SODIUM SERPL-SCNC: 140 MMOL/L (ref 136–145)
WBC # BLD AUTO: 14.43 THOUSAND/UL (ref 4.31–10.16)

## 2019-01-31 PROCEDURE — 83690 ASSAY OF LIPASE: CPT | Performed by: INTERNAL MEDICINE

## 2019-01-31 PROCEDURE — 99252 IP/OBS CONSLTJ NEW/EST SF 35: CPT | Performed by: PSYCHIATRY & NEUROLOGY

## 2019-01-31 PROCEDURE — 85025 COMPLETE CBC W/AUTO DIFF WBC: CPT | Performed by: INTERNAL MEDICINE

## 2019-01-31 PROCEDURE — 80053 COMPREHEN METABOLIC PANEL: CPT | Performed by: INTERNAL MEDICINE

## 2019-01-31 PROCEDURE — 99232 SBSQ HOSP IP/OBS MODERATE 35: CPT | Performed by: INTERNAL MEDICINE

## 2019-01-31 PROCEDURE — 94762 N-INVAS EAR/PLS OXIMTRY CONT: CPT

## 2019-01-31 RX ORDER — POTASSIUM CHLORIDE 14.9 MG/ML
20 INJECTION INTRAVENOUS
Status: COMPLETED | OUTPATIENT
Start: 2019-01-31 | End: 2019-02-01

## 2019-01-31 RX ORDER — HEPARIN SODIUM 5000 [USP'U]/ML
5000 INJECTION, SOLUTION INTRAVENOUS; SUBCUTANEOUS EVERY 8 HOURS SCHEDULED
Status: DISCONTINUED | OUTPATIENT
Start: 2019-01-31 | End: 2019-02-08 | Stop reason: HOSPADM

## 2019-01-31 RX ORDER — HYDROMORPHONE HCL/PF 1 MG/ML
1 SYRINGE (ML) INJECTION EVERY 2 HOUR PRN
Status: DISCONTINUED | OUTPATIENT
Start: 2019-01-31 | End: 2019-02-04

## 2019-01-31 RX ORDER — POTASSIUM CHLORIDE 29.8 MG/ML
40 INJECTION INTRAVENOUS ONCE
Status: DISCONTINUED | OUTPATIENT
Start: 2019-01-31 | End: 2019-01-31 | Stop reason: CLARIF

## 2019-01-31 RX ADMIN — SODIUM CHLORIDE 250 ML/HR: 0.9 INJECTION, SOLUTION INTRAVENOUS at 23:18

## 2019-01-31 RX ADMIN — HYDROMORPHONE HYDROCHLORIDE 1 MG: 1 INJECTION, SOLUTION INTRAMUSCULAR; INTRAVENOUS; SUBCUTANEOUS at 10:59

## 2019-01-31 RX ADMIN — SODIUM CHLORIDE 250 ML/HR: 0.9 INJECTION, SOLUTION INTRAVENOUS at 07:20

## 2019-01-31 RX ADMIN — HYDROMORPHONE HYDROCHLORIDE 1 MG: 1 INJECTION, SOLUTION INTRAMUSCULAR; INTRAVENOUS; SUBCUTANEOUS at 20:04

## 2019-01-31 RX ADMIN — SODIUM CHLORIDE 250 ML/HR: 0.9 INJECTION, SOLUTION INTRAVENOUS at 17:27

## 2019-01-31 RX ADMIN — SODIUM CHLORIDE 250 ML/HR: 0.9 INJECTION, SOLUTION INTRAVENOUS at 02:23

## 2019-01-31 RX ADMIN — HYDROMORPHONE HYDROCHLORIDE 1 MG: 1 INJECTION, SOLUTION INTRAMUSCULAR; INTRAVENOUS; SUBCUTANEOUS at 03:37

## 2019-01-31 RX ADMIN — HEPARIN SODIUM 5000 UNITS: 5000 INJECTION INTRAVENOUS; SUBCUTANEOUS at 15:23

## 2019-01-31 RX ADMIN — POTASSIUM CHLORIDE 20 MEQ: 200 INJECTION, SOLUTION INTRAVENOUS at 18:33

## 2019-01-31 RX ADMIN — HEPARIN SODIUM 5000 UNITS: 5000 INJECTION INTRAVENOUS; SUBCUTANEOUS at 21:34

## 2019-01-31 RX ADMIN — FOLIC ACID: 5 INJECTION, SOLUTION INTRAMUSCULAR; INTRAVENOUS; SUBCUTANEOUS at 17:46

## 2019-01-31 RX ADMIN — HYDROMORPHONE HYDROCHLORIDE 1 MG: 1 INJECTION, SOLUTION INTRAMUSCULAR; INTRAVENOUS; SUBCUTANEOUS at 16:44

## 2019-01-31 RX ADMIN — HYDROMORPHONE HYDROCHLORIDE 1 MG: 1 INJECTION, SOLUTION INTRAMUSCULAR; INTRAVENOUS; SUBCUTANEOUS at 07:31

## 2019-01-31 RX ADMIN — Medication 1000 MG: at 14:19

## 2019-01-31 RX ADMIN — HYDROMORPHONE HYDROCHLORIDE 1 MG: 1 INJECTION, SOLUTION INTRAMUSCULAR; INTRAVENOUS; SUBCUTANEOUS at 00:33

## 2019-01-31 RX ADMIN — HYDROMORPHONE HYDROCHLORIDE 1 MG: 1 INJECTION, SOLUTION INTRAMUSCULAR; INTRAVENOUS; SUBCUTANEOUS at 14:12

## 2019-01-31 RX ADMIN — Medication 1000 MG: at 05:34

## 2019-01-31 RX ADMIN — POTASSIUM CHLORIDE 20 MEQ: 200 INJECTION, SOLUTION INTRAVENOUS at 15:23

## 2019-01-31 RX ADMIN — Medication 1000 MG: at 21:34

## 2019-01-31 RX ADMIN — SODIUM CHLORIDE 250 ML/HR: 0.9 INJECTION, SOLUTION INTRAVENOUS at 12:08

## 2019-01-31 NOTE — PROGRESS NOTES
Progress Note - Akshat Wall 1975, 37 y o  female MRN: [id number]    Unit/Bed#: [address] [place] [address] Encounter: [id number]    Primary Care Provider: Higinio Higgins MD   Date and time admitted to hospital: 1/30/2019  8:29 AM        * Acute pancreatitis   Assessment & Plan    Change Q 3 Dilaudid to Q 2 p r n  Continue IV resuscitation with normal saline 250 cc an hour  Supportive care including p r n  Antiemetics  Appreciate GI assistance  Continue to Trend lipase  Monitor clinically  NPO     UTI (urinary tract infection)   Assessment & Plan    Follow-up urine culture  Monitor for leukocytosis with CBC in the a m  Continue IV aztreonam to treat empirically based on multiple allergies     Alcohol abuse   Assessment & Plan    Continue IV thiamine and folate supplementation daily  Once tolerating p o  Will change this to p o  Route  Cessation counseled  Discussion with the patient reveals that this may be dual diagnosis an attempt to self treat with alcohol as results psychiatry consult has been placed for evaluation   [facility] protocol     Morbid obesity ([facility] [address] )   Assessment & Plan    Lifestyle modifications recommended  Nutrition consult     Cholelithiasis   Assessment & Plan    GI following  Trend LFTs, improving  MRCP today         VTE Pharmacologic Prophylaxis:   Pharmacologic: Heparin  Mechanical VTE Prophylaxis in Place: Yes    Patient Centered Rounds: I have performed bedside rounds with nursing staff today  Discussions with Specialists or Other Care Team Provider:      Education and Discussions with Family / Patient:      Time Spent for Care: 30 minutes  More than 50% of total time spent on counseling and coordination of care as described above      Current Length of Stay: 1 day(s)    Current Patient Status: Inpatient   Certification Statement: The patient will continue to require additional inpatient hospital stay due to pancreatitis    Discharge Plan:      Code Status: Level 1 - Full Code      Subjective: No acute events overnight today patient has no new complaints still with abdominal pain ongoing treated by Dilaudid  Objective:     Vitals:   Temp (24hrs), Av 5 °F (36 9 °C), Min:98 2 °F (36 8 °C), Max:98 9 °F (37 2 °C)    Temp:  [98 2 °F (36 8 °C)-98 9 °F (37 2 °C)] 98 2 °F (36 8 °C)  HR:  [101-109] 106  Resp:  [18-20] 20  BP: (131-179)/(74-92) 143/77  SpO2:  [88 %-94 %] 91 %  Body mass index is 50 39 kg/m²  Input and Output Summary (last 24 hours): Intake/Output Summary (Last 24 hours) at 19 1425  Last data filed at 19 1401   Gross per 24 hour   Intake          3720 83 ml   Output                0 ml   Net          3720 83 ml       Physical Exam:     Physical Exam   Constitutional: She is oriented to person, place, and time  She appears well-developed and well-nourished  No distress  HENT:   Head: Normocephalic and atraumatic  Right Ear: External ear normal    Left Ear: External ear normal    Nose: Nose normal    Mouth/Throat: Oropharynx is clear and moist  No oropharyngeal exudate  Eyes: Conjunctivae are normal  Right eye exhibits no discharge  Left eye exhibits no discharge  No scleral icterus  Neck: Normal range of motion  Neck supple  No JVD present  No tracheal deviation present  No thyromegaly present  Cardiovascular: Normal rate, regular rhythm, normal heart sounds and intact distal pulses  Exam reveals no gallop and no friction rub  No murmur heard  Pulmonary/Chest: Breath sounds normal  No stridor  No respiratory distress  She has no wheezes  She has no rales  Abdominal: Soft  Bowel sounds are normal  She exhibits no distension  There is tenderness (Tender to light palpation right upper quadrant and epigastrium)  There is no rebound and no guarding  Musculoskeletal: Normal range of motion  She exhibits no edema, tenderness or deformity  Neurological: She is alert and oriented to person, place, and time  She exhibits normal muscle tone   Coordination normal  Skin: Skin is warm and dry  No rash noted  She is not diaphoretic  No erythema  No pallor  Nursing note and vitals reviewed  Additional Data:     Labs:      Results from last 7 days  Lab Units 01/31/19  0548   WBC Thousand/uL 14 43*   HEMOGLOBIN g/dL 14 2   HEMATOCRIT % 43 4   PLATELETS Thousands/uL 143*   NEUTROS PCT % 88*   LYMPHS PCT % 5*   MONOS PCT % 6   EOS PCT % 0       Results from last 7 days  Lab Units 01/31/19  0548   SODIUM mmol/L 140   POTASSIUM mmol/L 3 3*   CHLORIDE mmol/L 100   CO2 mmol/L 30   BUN mg/dL 10   CREATININE mg/dL 0 92   ANION GAP mmol/L 10   CALCIUM mg/dL 7 9*   ALBUMIN g/dL 3 1*   TOTAL BILIRUBIN mg/dL 1 60*   ALK PHOS U/L 86   ALT U/L 58   AST U/L 41   GLUCOSE RANDOM mg/dL 109                           * I Have Reviewed All Lab Data Listed Above  * Additional Pertinent Lab Tests Reviewed: [address] Admission Reviewed    Imaging:    Imaging Reports Reviewed Today Include:    Imaging Personally Reviewed by Myself Includes:       Recent Cultures (last 7 days):           Last 24 Hours Medication List:     Current Facility-Administered Medications:  aztreonam 1,000 mg Intravenous Yadi Koch MD Last Rate: 1,000 mg (01/31/19 1419)   HYDROmorphone 1 mg Intravenous Q2H PRN Jaiden Thomson MD    ondansetron 4 mg Intravenous Q4H PRN Jaiden Thomson MD    sodium chloride 250 mL/hr Intravenous Continuous Jaiden Thomson MD Last Rate: 250 mL/hr (01/31/19 1208)   IVPB builder  Intravenous Q24H Jaiden Thomson MD Last Rate: 100 mL/hr at 01/30/19 1542        Today, Patient Was Seen By: Jaiden Thomson MD    ** Please Note: Dictation voice to text software may have been used in the creation of this document   **

## 2019-01-31 NOTE — ASSESSMENT & PLAN NOTE
Continue IV thiamine and folate supplementation daily  Once tolerating p o  Will change this to p o   Route  Cessation counseled  Discussion with the patient reveals that this may be dual diagnosis an attempt to self treat with alcohol as results psychiatry consult has been placed for evaluation   [facility] protocol

## 2019-01-31 NOTE — PROGRESS NOTES
[facility]  [id number]    37 y o   female      ASSESSMENT  1  Acute pancreatitis  Lipase decreasing  Ongoing abdominal pain  Exclude choledocholithiasis versus alcohol  LFTs improving  Total bilirubin has decreased to 1 6 and aminotransferase levels have normalized  Possibly a passed common bile duct stone  PLAN  1  Check MRCP today  2  If MRCP positive for common bile duct stone advise transfer to [facility] for an ERCP as this will be technically difficult due to her history of a Adilson-en-Y gastric bypass surgery in the past,  3  Check lipid panel    Chief Complaint   Patient presents with    Abdominal Pain     Patient presents to the ER stating she started with RUQ abdominal pain yesterday, has history of gall stones       SUBJECTIVE/HPI   Ongoing abdominal pain  Occasional nausea without vomiting      /85 (BP Location: Right arm)   Pulse 101   Temp 98 3 °F (36 8 °C) (Oral)   Resp 18   Ht 5' 6" (1 676 m)   Wt (!) 142 kg (312 lb 2 7 oz)   LMP 01/16/2019   SpO2 92%   BMI 50 39 kg/m²       PHYSICALEXAM  Constitutional:  Well developed, well nourished, no acute distress, non-toxic appearance   Eyes:   conjunctiva normal   HENT:  Atraumatic  Respiratory:  No respiratory distress  Cardiovascular:  Tachycardic  GI:  Soft, nondistended, normal bowel sounds, tender in epigastric area, no rebound or guarding   Musculoskeletal:  No edema  Neurologic:  Alert & oriented x 3  Psychiatric:  Speech and behavior appropriate       Lab Results   Component Value Date    CALCIUM 7 9 (L) 01/31/2019    K 3 3 (L) 01/31/2019    CO2 30 01/31/2019     01/31/2019    BUN 10 01/31/2019    CREATININE 0 92 01/31/2019     Lab Results   Component Value Date    WBC 14 43 (H) 01/31/2019    HGB 14 2 01/31/2019    HCT 43 4 01/31/2019     (H) 01/31/2019     (L) 01/31/2019     Lab Results   Component Value Date    ALT 58 01/31/2019    AST 41 01/31/2019    ALKPHOS 86 01/31/2019     Lab Results Component Value Date    AMYLASE 33 09/27/2018     Lab Results   Component Value Date    LIPASE 801 (H) 01/31/2019     No results found for: IRON, TIBC, FERRITIN  No results found for: INR    Counseling / Coordination of Care  Total floor / unit time spent today 25 minutes  Greater than 50% of total time was spent with the patient and / or family counseling and / or coordination of care  A description of the counseling / coordination of care: 15    Octavio Gordon

## 2019-01-31 NOTE — ASSESSMENT & PLAN NOTE
Follow-up urine culture  Monitor for leukocytosis with CBC in the a m    Continue IV aztreonam to treat empirically based on multiple allergies

## 2019-01-31 NOTE — ASSESSMENT & PLAN NOTE
Change Q 3 Dilaudid to Q 2 p r n  Continue IV resuscitation with normal saline 250 cc an hour  Supportive care including p r n   Antiemetics  Appreciate GI assistance  Continue to Trend lipase  Monitor clinically  NPO

## 2019-01-31 NOTE — SOCIAL WORK
Met with Pt  Pt presents AA&Ox3  Discussed role of   Pt lives with  in 2s apartment, no steps to enter  Pt is independent with adls and ambulation  Pt drives and goes grocery shopping  Pt uses [facility] [employer] in [facility]  Pt drinks 1/2 vodka every 2 days  Pt declines alcohol resources or rehab  Pt plans to return home upon discharge  Will follow

## 2019-01-31 NOTE — UTILIZATION REVIEW
Initial Clinical Review    Admission: Date/Time/Statement: 1/30/19 @ 1048   Orders Placed This Encounter   Procedures    Inpatient Admission (expected length of stay for this patient Order details is greater than two midnights)     Standing Status:   Standing     Number of Occurrences:   1     Order Specific Question:   Admitting Physician     Answer:   Alexander Devries [[id number]]     Order Specific Question:   Level of Care     Answer:   Med Surg [[id number]]     Order Specific Question:   Estimated length of stay     Answer:   More than 2 Midnights     Order Specific Question:   Certification     Answer:   I certify that inpatient services are medically necessary for this patient for a duration of greater than two midnights  See H&P and MD Progress Notes for additional information about the patient's course of treatment  ED: Date/Time/Mode of Arrival:   ED Arrival Information     Expected Arrival Acuity Means of Arrival Escorted By Service Admission Type    - 1/30/2019 08:25 Urgent Walk-In Spouse General Medicine Urgent    Arrival Complaint    abd pain        Chief Complaint:   Chief Complaint   Patient presents with    Abdominal Pain     Patient presents to the ER stating she started with RUQ abdominal pain yesterday, has history of gall stones     History of Illness:   Patient complains of right upper abd sharp pain radiate to center and down with associated nausea, dry heaves, dysuria  Decreased appetite as well  Symptoms have been gradual worsening over last 2 days  Hx of gallstones and pancreatitis diagnosed September 2018  Admits to subjective fever chills  Drinks alcohol daily  Abdominal: There is tenderness in the right upper quadrant, epigastric area and left upper quadrant    ED Vital Signs:   ED Triage Vitals   Temperature Pulse Respirations Blood Pressure SpO2   01/30/19 1027 01/30/19 0930 01/30/19 0930 01/30/19 0845 01/30/19 0930   97 6 °F (36 4 °C) 72 20 (!) 143/102 92 %      Temp Source Heart Rate Source Patient Position - Orthostatic VS BP Location FiO2 (%)   01/30/19 1027 01/30/19 0930 01/30/19 0845 01/30/19 0845 --   Tympanic Monitor Lying Right arm       Pain Score       01/30/19 0832       9        Wt Readings from Last 1 Encounters:   01/31/19 (!) 142 kg (312 lb 2 7 oz)     Vital Signs (abnormal):   /105, 175/85, 174/75  O2 SAT  89% RA  Pertinent Labs/Diagnostic Test Results:   WBC 15 13 HGB 17 1 CL 94 ANION GAP 14   TPROT 8 6 TBILI 2 60 LIPASE 2839  U/A: ORANGE, +LEUK, NITRITE, PROT, KETONES, BILI, BLOOD  CT A/P=1  Acute pancreatitis  No organized pancreatic or peripancreatic fluid collection  2   Cholelithiasis without evidence of cholecystitis  3   Stable hepatomegaly with steatosis  ED Treatment:   Medication Administration from 01/30/2019 0825 to 01/30/2019 1123       Date/Time Order Dose Route Action Action by Comments     01/30/2019 0916 ondansetron (ZOFRAN) injection 4 mg 4 mg Intravenous Given Sully Frazier RN      01/30/2019 1117 sodium chloride 0 9 % infusion 0 mL/hr Intravenous Stopped Sully Frazier RN      01/30/2019 0916 sodium chloride 0 9 % infusion 1,000 mL/hr Intravenous [address] Sully Frazier RN      01/30/2019 6633 ketorolac (TORADOL) injection 30 mg 30 mg Intravenous Given Sully Frazier RN      01/30/2019 0950 iohexol (OMNIPAQUE) 350 MG/ML injection (MULTI-DOSE) 100 mL 100 mL Intravenous Given Nereida Amezcua      01/30/2019 1035 morphine (PF) 4 mg/mL injection 4 mg 4 mg Intravenous Given Sully Frazier RN         Past Medical/Surgical History:    Active Ambulatory Problems     Diagnosis Date Noted    Acute pancreatitis 09/25/2018    Cholelithiasis 09/25/2018    Ventral hernia without obstruction or gangrene 09/26/2018    Morbid obesity ([address] ) 09/27/2018     Resolved Ambulatory Problems     Diagnosis Date Noted    No Resolved Ambulatory Problems     Past Medical History:   Diagnosis Date    Asthma     Pancreatitis      Admitting Diagnosis: Acute pancreatitis [K85 90]  Abdominal pain [R10 9]  Age/Sex: 37 y o  female  Assessment/Plan:   38 YO FEMALE TO ER FROM HOME C/O ABD PAIN WITH NAUSEA  HX DAILY DRINKER, PANCREATITIS IN THE PAST  PRESENTS WITH  ABD PAIN & TENDERNESS WITH NAUSEA, URINARY FREQUENCY  CT SHOWING ACUTE PANCREATITIS, U/A+  ADMITTED TO INPATIENT STATUS & STARTED ON IVABT & IVF, GI & PSYCHE CONSULTED  Admission Orders:  MED SURG  CONSULT GI  CONSULT PSYCHE  CIWA PROTOCOL  VENODYNES  NPO  Scheduled Meds:   Current Facility-Administered Medications:  aztreonam 1,000 mg Intravenous Q8H   HYDROmorphone 1 mg Intravenous Q3H PRN   ondansetron 4 mg Intravenous Q4H PRN   IVPB builder  Intravenous Q24H     Continuous Infusions:   sodium chloride 250 mL/hr Last Rate: 250 mL/hr (01/31/19 0720)     PRN Meds: HYDROmorphone; USED X3    ondansetron  Network Utilization Review Department  Phone: [phone]; Fax [phone]  [email or web address]  ATTENTION: Please call with any questions or concerns to [phone]  and carefully listen to the prompts so that you are directed to the right person  Send all requests for admission clinical reviews, approved or denied determinations and any other requests to fax [phone]   All voicemails are confidential

## 2019-01-31 NOTE — CONSULTS
Consultation - [address] [facility] 37 y o  female MRN: [id number]  Unit/Bed#: 2 [place] 205-01 Encounter: [id number]    Assessment/Plan     Assessment:  Unspecified mood (f39)  Alcohol abuse    Plan:   Refer to outpatient psychiatry follow-up on discharge for further management and evaluation if patient is interested  Psychiatry will sign off    Chief Complaint: "I am ok"    History of Present Illness   Physician Requesting Consult: Jaiden Thomson MD  Reason for Consult / Principal Problem:  Psychiatric evaluation for underlying depression or anxiety    Patient is a 37 y o  female presents to hospital with symptoms of acute pancreatitis and alcohol abuse  Psychiatry is consulted for evaluation of underlying depression or anxiety that might be contributing to her drinking more alcohol  Patient remained cooperative during entire evaluation but right from the beginning of evaluation patient is not interested in speaking with psychiatrist   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 and will prefer to speak with therapist on discharge  Patient denies any symptoms of depression, donita, psychosis, suicidal or homicidal ideation  Patient has full affect and is not showing any behavior of dangerousness  Patient is currently denying alcohol withdrawal symptoms  Later part of the session was dedicated to educating patient on impact of alcohol on mood and physical symptoms including pancreatitis      Consults    Psychiatric Review Of Systems:  sleep: no  appetite changes: yes  weight changes: no  energy/anergy: yes  interest/pleasure/anhedonia: no  somatic symptoms: yes  anxiety/panic: yes  donita: no  guilty/hopeless: no  self injurious behavior/risky behavior: no    Historical Information   Past Psychiatric History:   Denies prior psychiatric history      Past Medical History:   Diagnosis Date    Asthma     Pancreatitis        Medical Review Of Systems:  Review of Systems    Meds/Allergies   all current active meds have been reviewed  Allergies   Allergen Reactions    Augmentin [Amoxicillin-Pot Clavulanate]     Ciprofloxacin     Doxycycline     Latex     Penicillins        Objective   Vital signs in last 24 hours:  Temp:  [97 6 °F (36 4 °C)-98 9 °F (37 2 °C)] 98 3 °F (36 8 °C)  HR:  [] 101  Resp:  [18-20] 18  BP: (131-179)/(74-92) 137/85      Intake/Output Summary (Last 24 hours) at 01/31/19 1010  Last data filed at 01/31/19 0501   Gross per 24 hour   Intake             2000 ml   Output                0 ml   Net             2000 ml       Mental Status Evaluation:  Appearance:  casually dressed   Behavior:  guarded   Speech:  soft   Mood:  anxious   Affect:  normal   Language: naming objects   Thought Process:  normal   Thought Content:  normal   Perceptual Disturbances: None   Risk Potential: Suicidal Ideations none, Homicidal Ideations none and Potential for Aggression No   Sensorium:  person and place   Cognition:  grossly intact   Consciousness:  awake    Attention: attention span and concentration were age appropriate   Intellect: normal   Fund of Knowledge: awareness of current events: fair   Insight:  fair   Judgment: fair   Muscle Strength and Tone: arm(s): bilateral   Gait/Station: in bed   Motor Activity: no abnormal movements     Lab Results: reviewed

## 2019-02-01 LAB
ALBUMIN SERPL BCP-MCNC: 2.8 G/DL (ref 3.5–5)
ALP SERPL-CCNC: 89 U/L (ref 46–116)
ALT SERPL W P-5'-P-CCNC: 39 U/L (ref 12–78)
ANION GAP SERPL CALCULATED.3IONS-SCNC: 11 MMOL/L (ref 4–13)
ARTERIAL PATENCY WRIST A: ABNORMAL
AST SERPL W P-5'-P-CCNC: 23 U/L (ref 5–45)
BACTERIA UR QL AUTO: ABNORMAL /HPF
BASE EXCESS BLDA CALC-SCNC: -5 MMOL/L (ref -2–3)
BASOPHILS # BLD AUTO: 0.05 THOUSANDS/ΜL (ref 0–0.1)
BASOPHILS NFR BLD AUTO: 0 % (ref 0–1)
BILIRUB SERPL-MCNC: 2.9 MG/DL (ref 0.2–1)
BILIRUB UR QL STRIP: ABNORMAL
BUN SERPL-MCNC: 9 MG/DL (ref 5–25)
CALCIUM SERPL-MCNC: 7.3 MG/DL (ref 8.3–10.1)
CHLORIDE SERPL-SCNC: 101 MMOL/L (ref 100–108)
CHOLEST SERPL-MCNC: 135 MG/DL (ref 50–200)
CLARITY UR: CLEAR
CO2 SERPL-SCNC: 26 MMOL/L (ref 21–32)
COLOR UR: ABNORMAL
CREAT SERPL-MCNC: 0.76 MG/DL (ref 0.6–1.3)
EOSINOPHIL # BLD AUTO: 0.11 THOUSAND/ΜL (ref 0–0.61)
EOSINOPHIL NFR BLD AUTO: 1 % (ref 0–6)
ERYTHROCYTE [DISTWIDTH] IN BLOOD BY AUTOMATED COUNT: 11.9 % (ref 11.6–15.1)
ETHANOL SERPL-MCNC: <3 MG/DL (ref 0–3)
FIO2 GAS DIL.REBREATH: 100 L
GFR SERPL CREATININE-BSD FRML MDRD: 96 ML/MIN/1.73SQ M
GLUCOSE SERPL-MCNC: 105 MG/DL (ref 65–140)
GLUCOSE SERPL-MCNC: 92 MG/DL (ref 65–140)
GLUCOSE SERPL-MCNC: 92 MG/DL (ref 65–140)
GLUCOSE UR STRIP-MCNC: NEGATIVE MG/DL
HCO3 BLDA-SCNC: 20.7 MMOL/L (ref 22–28)
HCT VFR BLD AUTO: 40.5 % (ref 34.8–46.1)
HDLC SERPL-MCNC: 9 MG/DL (ref 40–60)
HGB BLD-MCNC: 13.2 G/DL (ref 11.5–15.4)
HGB UR QL STRIP.AUTO: NEGATIVE
HYALINE CASTS #/AREA URNS LPF: ABNORMAL /LPF
IMM GRANULOCYTES # BLD AUTO: 0.29 THOUSAND/UL (ref 0–0.2)
IMM GRANULOCYTES NFR BLD AUTO: 2 % (ref 0–2)
KETONES UR STRIP-MCNC: ABNORMAL MG/DL
LDLC SERPL CALC-MCNC: 100 MG/DL (ref 0–100)
LEUKOCYTE ESTERASE UR QL STRIP: NEGATIVE
LIPASE SERPL-CCNC: 213 U/L (ref 73–393)
LYMPHOCYTES # BLD AUTO: 0.72 THOUSANDS/ΜL (ref 0.6–4.47)
LYMPHOCYTES NFR BLD AUTO: 5 % (ref 14–44)
MAGNESIUM SERPL-MCNC: 1 MG/DL (ref 1.6–2.6)
MCH RBC QN AUTO: 35.2 PG (ref 26.8–34.3)
MCHC RBC AUTO-ENTMCNC: 32.6 G/DL (ref 31.4–37.4)
MCV RBC AUTO: 108 FL (ref 82–98)
MONOCYTES # BLD AUTO: 0.94 THOUSAND/ΜL (ref 0.17–1.22)
MONOCYTES NFR BLD AUTO: 6 % (ref 4–12)
MUCOUS THREADS UR QL AUTO: ABNORMAL
NEUTROPHILS # BLD AUTO: 12.65 THOUSANDS/ΜL (ref 1.85–7.62)
NEUTS SEG NFR BLD AUTO: 86 % (ref 43–75)
NITRITE UR QL STRIP: NEGATIVE
NON-SQ EPI CELLS URNS QL MICRO: ABNORMAL /HPF
NONHDLC SERPL-MCNC: 126 MG/DL
NRBC BLD AUTO-RTO: 0 /100 WBCS
PCO2 BLD: 22 MMOL/L (ref 21–32)
PCO2 BLD: 39.8 MM HG (ref 36–44)
PH BLD: 7.32 [PH] (ref 7.35–7.45)
PH UR STRIP.AUTO: 6 [PH] (ref 4.5–8)
PLATELET # BLD AUTO: 123 THOUSANDS/UL (ref 149–390)
PMV BLD AUTO: 11 FL (ref 8.9–12.7)
PO2 BLD: 89 MM HG (ref 75–129)
POTASSIUM SERPL-SCNC: 3 MMOL/L (ref 3.5–5.3)
PROT SERPL-MCNC: 7.2 G/DL (ref 6.4–8.2)
PROT UR STRIP-MCNC: ABNORMAL MG/DL
RBC # BLD AUTO: 3.75 MILLION/UL (ref 3.81–5.12)
RBC #/AREA URNS AUTO: ABNORMAL /HPF
SAMPLE SITE: ABNORMAL
SAO2 % BLD FROM PO2: 96 % (ref 95–98)
SODIUM SERPL-SCNC: 138 MMOL/L (ref 136–145)
SP GR UR STRIP.AUTO: 1.02 (ref 1–1.03)
SPECIMEN SOURCE: ABNORMAL
TRIGL SERPL-MCNC: 132 MG/DL
UROBILINOGEN UR QL STRIP.AUTO: 0.2 E.U./DL
WBC # BLD AUTO: 14.76 THOUSAND/UL (ref 4.31–10.16)
WBC #/AREA URNS AUTO: ABNORMAL /HPF

## 2019-02-01 PROCEDURE — 80053 COMPREHEN METABOLIC PANEL: CPT | Performed by: INTERNAL MEDICINE

## 2019-02-01 PROCEDURE — 83735 ASSAY OF MAGNESIUM: CPT | Performed by: INTERNAL MEDICINE

## 2019-02-01 PROCEDURE — 83690 ASSAY OF LIPASE: CPT | Performed by: INTERNAL MEDICINE

## 2019-02-01 PROCEDURE — 82948 REAGENT STRIP/BLOOD GLUCOSE: CPT

## 2019-02-01 PROCEDURE — 82803 BLOOD GASES ANY COMBINATION: CPT

## 2019-02-01 PROCEDURE — 85025 COMPLETE CBC W/AUTO DIFF WBC: CPT | Performed by: INTERNAL MEDICINE

## 2019-02-01 PROCEDURE — 99233 SBSQ HOSP IP/OBS HIGH 50: CPT | Performed by: INTERNAL MEDICINE

## 2019-02-01 PROCEDURE — 80061 LIPID PANEL: CPT | Performed by: INTERNAL MEDICINE

## 2019-02-01 PROCEDURE — 81001 URINALYSIS AUTO W/SCOPE: CPT | Performed by: INTERNAL MEDICINE

## 2019-02-01 PROCEDURE — 80320 DRUG SCREEN QUANTALCOHOLS: CPT | Performed by: NURSE PRACTITIONER

## 2019-02-01 PROCEDURE — 93005 ELECTROCARDIOGRAM TRACING: CPT

## 2019-02-01 PROCEDURE — 99254 IP/OBS CNSLTJ NEW/EST MOD 60: CPT | Performed by: INTERNAL MEDICINE

## 2019-02-01 PROCEDURE — 99232 SBSQ HOSP IP/OBS MODERATE 35: CPT | Performed by: NURSE PRACTITIONER

## 2019-02-01 RX ORDER — POTASSIUM CHLORIDE 14.9 MG/ML
20 INJECTION INTRAVENOUS
Status: COMPLETED | OUTPATIENT
Start: 2019-02-01 | End: 2019-02-01

## 2019-02-01 RX ORDER — LORAZEPAM 2 MG/ML
2 INJECTION INTRAMUSCULAR ONCE
Status: COMPLETED | OUTPATIENT
Start: 2019-02-01 | End: 2019-02-01

## 2019-02-01 RX ORDER — LORAZEPAM 2 MG/ML
INJECTION INTRAMUSCULAR
Status: DISPENSED
Start: 2019-02-01 | End: 2019-02-01

## 2019-02-01 RX ORDER — LORAZEPAM 2 MG/ML
4 INJECTION INTRAMUSCULAR ONCE
Status: COMPLETED | OUTPATIENT
Start: 2019-02-01 | End: 2019-02-01

## 2019-02-01 RX ORDER — MAGNESIUM SULFATE HEPTAHYDRATE 40 MG/ML
4 INJECTION, SOLUTION INTRAVENOUS ONCE
Status: COMPLETED | OUTPATIENT
Start: 2019-02-01 | End: 2019-02-01

## 2019-02-01 RX ORDER — HALOPERIDOL 5 MG/ML
5 INJECTION INTRAMUSCULAR ONCE
Status: COMPLETED | OUTPATIENT
Start: 2019-02-01 | End: 2019-02-01

## 2019-02-01 RX ORDER — LORAZEPAM 2 MG/ML
2 INJECTION INTRAMUSCULAR ONCE
Status: DISCONTINUED | OUTPATIENT
Start: 2019-02-01 | End: 2019-02-02

## 2019-02-01 RX ORDER — HALOPERIDOL 5 MG/ML
5 INJECTION INTRAMUSCULAR EVERY 6 HOURS PRN
Status: DISCONTINUED | OUTPATIENT
Start: 2019-02-01 | End: 2019-02-01

## 2019-02-01 RX ORDER — LORAZEPAM 2 MG/ML
INJECTION INTRAMUSCULAR
Status: DISPENSED
Start: 2019-02-01 | End: 2019-02-02

## 2019-02-01 RX ORDER — ALBUTEROL SULFATE 2.5 MG/3ML
2.5 SOLUTION RESPIRATORY (INHALATION) ONCE
Status: DISCONTINUED | OUTPATIENT
Start: 2019-02-01 | End: 2019-02-01

## 2019-02-01 RX ORDER — LORAZEPAM 2 MG/ML
INJECTION INTRAMUSCULAR
Status: COMPLETED
Start: 2019-02-01 | End: 2019-02-01

## 2019-02-01 RX ORDER — LORAZEPAM 2 MG/ML
2 INJECTION INTRAMUSCULAR EVERY 6 HOURS
Status: DISCONTINUED | OUTPATIENT
Start: 2019-02-01 | End: 2019-02-01

## 2019-02-01 RX ORDER — LORAZEPAM 2 MG/ML
1 INJECTION INTRAMUSCULAR EVERY 6 HOURS
Status: DISCONTINUED | OUTPATIENT
Start: 2019-02-01 | End: 2019-02-04

## 2019-02-01 RX ORDER — HALOPERIDOL 5 MG/ML
5 INJECTION INTRAMUSCULAR EVERY 6 HOURS PRN
Status: DISCONTINUED | OUTPATIENT
Start: 2019-02-01 | End: 2019-02-05

## 2019-02-01 RX ADMIN — LORAZEPAM 1 MG: 2 INJECTION, SOLUTION INTRAMUSCULAR; INTRAVENOUS at 20:04

## 2019-02-01 RX ADMIN — HEPARIN SODIUM 5000 UNITS: 5000 INJECTION INTRAVENOUS; SUBCUTANEOUS at 21:01

## 2019-02-01 RX ADMIN — SODIUM CHLORIDE 250 ML/HR: 0.9 INJECTION, SOLUTION INTRAVENOUS at 03:30

## 2019-02-01 RX ADMIN — HALOPERIDOL LACTATE 5 MG: 5 INJECTION, SOLUTION INTRAMUSCULAR at 13:41

## 2019-02-01 RX ADMIN — MAGNESIUM SULFATE HEPTAHYDRATE 4 G: 40 INJECTION, SOLUTION INTRAVENOUS at 10:12

## 2019-02-01 RX ADMIN — DEXMEDETOMIDINE HYDROCHLORIDE 0.3 MCG/KG/HR: 100 INJECTION, SOLUTION INTRAVENOUS at 21:01

## 2019-02-01 RX ADMIN — HALOPERIDOL LACTATE 5 MG: 5 INJECTION, SOLUTION INTRAMUSCULAR at 15:45

## 2019-02-01 RX ADMIN — LORAZEPAM 4 MG: 2 INJECTION, SOLUTION INTRAMUSCULAR; INTRAVENOUS at 04:57

## 2019-02-01 RX ADMIN — LORAZEPAM 2 MG: 2 INJECTION, SOLUTION INTRAMUSCULAR; INTRAVENOUS at 14:09

## 2019-02-01 RX ADMIN — DEXMEDETOMIDINE HYDROCHLORIDE 0.1 MCG/KG/HR: 100 INJECTION, SOLUTION INTRAVENOUS at 17:47

## 2019-02-01 RX ADMIN — FOLIC ACID 1 MG: 5 INJECTION, SOLUTION INTRAMUSCULAR; INTRAVENOUS; SUBCUTANEOUS at 18:37

## 2019-02-01 RX ADMIN — HEPARIN SODIUM 5000 UNITS: 5000 INJECTION INTRAVENOUS; SUBCUTANEOUS at 05:23

## 2019-02-01 RX ADMIN — Medication 1000 MG: at 14:22

## 2019-02-01 RX ADMIN — POTASSIUM CHLORIDE 20 MEQ: 200 INJECTION, SOLUTION INTRAVENOUS at 11:20

## 2019-02-01 RX ADMIN — LORAZEPAM 4 MG: 2 INJECTION INTRAMUSCULAR at 06:03

## 2019-02-01 RX ADMIN — HALOPERIDOL LACTATE 5 MG: 5 INJECTION, SOLUTION INTRAMUSCULAR at 07:38

## 2019-02-01 RX ADMIN — POTASSIUM CHLORIDE 20 MEQ: 200 INJECTION, SOLUTION INTRAVENOUS at 09:38

## 2019-02-01 RX ADMIN — HYDROMORPHONE HYDROCHLORIDE 1 MG: 1 INJECTION, SOLUTION INTRAMUSCULAR; INTRAVENOUS; SUBCUTANEOUS at 00:07

## 2019-02-01 RX ADMIN — Medication 1000 MG: at 05:23

## 2019-02-01 RX ADMIN — LORAZEPAM 4 MG: 2 INJECTION, SOLUTION INTRAMUSCULAR; INTRAVENOUS at 06:03

## 2019-02-01 RX ADMIN — SODIUM CHLORIDE 150 ML/HR: 0.9 INJECTION, SOLUTION INTRAVENOUS at 08:34

## 2019-02-01 RX ADMIN — FOLIC ACID: 5 INJECTION, SOLUTION INTRAMUSCULAR; INTRAVENOUS; SUBCUTANEOUS at 16:28

## 2019-02-01 RX ADMIN — CALCIUM GLUCONATE 1 G: 98 INJECTION, SOLUTION INTRAVENOUS at 17:59

## 2019-02-01 RX ADMIN — HEPARIN SODIUM 5000 UNITS: 5000 INJECTION INTRAVENOUS; SUBCUTANEOUS at 14:22

## 2019-02-01 RX ADMIN — DEXMEDETOMIDINE HYDROCHLORIDE 60 MCG: 100 INJECTION, SOLUTION INTRAVENOUS at 17:18

## 2019-02-01 NOTE — PROGRESS NOTES
Patient with periodic apneic breathing  Sats in the 60s on 6L  Patient placed on simple mask as seen as mouth breather and sats immediately went back up into the mid 90s  Patient also noted to be asthmatic and bronchial airways do sound tight in comparison to AM assessment  Dr Sissy Connell made aware

## 2019-02-01 NOTE — PROGRESS NOTES
Antwan Coburn  [id number]    37 y o   female      ASSESSMENT  1  Acute pancreatitis likely on the basis of alcohol  She seems to be going through alcohol withdrawal at the present time  Cholelithiasis without evidence of cholecystitis  PLAN  1  Continue alcohol withdrawal protocol  2  Hold on MRCP at the present time due to acute alcohol withdrawal   Apparently she has to have a MRCP at an outside institution due to body habitus  If an ERCP is indicated she will also need to have an ERCP aoutside institution as this will be technically difficult due to her history of a Adilson-en-Y gastric bypass surgery    Chief Complaint   Patient presents with    Abdominal Pain     Patient presents to the ER stating she started with RUQ abdominal pain yesterday, has history of gall stones       SUBJECTIVE/HPI   Patient hallucinating and agitated appears to be going through acute alcohol withdrawal     BP (!) 150/106   Pulse (!) 112   Temp 98 4 °F (36 9 °C) (Oral)   Resp (!) 24   Ht 5' 6" (1 676 m)   Wt (!) 146 kg (321 lb 14 oz)   LMP 01/16/2019   SpO2 93%   BMI 51 95 kg/m²       PHYSICALEXAM  Constitutional:   non-toxic appearance   Eyes:  conjunctiva normal   HENT:  Atraumatic  Respiratory:  No respiratory distress   Cardiovascular:  Tachycardic  GI:  Soft, nondistended, normal bowel sounds, nontender  Musculoskeletal:  No edema   Neurologic:  Disoriented, restrained    Unable to follow commands  Psychiatric:  Unable to follow commands      Lab Results   Component Value Date    CALCIUM 7 3 (L) 02/01/2019    K 3 0 (L) 02/01/2019    CO2 26 02/01/2019     02/01/2019    BUN 9 02/01/2019    CREATININE 0 76 02/01/2019     Lab Results   Component Value Date    WBC 14 76 (H) 02/01/2019    HGB 13 2 02/01/2019    HCT 40 5 02/01/2019     (H) 02/01/2019     (L) 02/01/2019     Lab Results   Component Value Date    ALT 39 02/01/2019    AST 23 02/01/2019    ALKPHOS 89 02/01/2019     Lab Results   Component Value Date    AMYLASE 33 09/27/2018     Lab Results   Component Value Date    LIPASE 213 02/01/2019     No results found for: IRON, TIBC, FERRITIN  No results found for: INR    Counseling / Coordination of Care  Total floor / unit time spent today 25 minutes  Greater than 50% of total time was spent with the patient and / or family counseling and / or coordination of care  A description of the counseling / coordination of care: 15    Ferol Pap

## 2019-02-01 NOTE — ASSESSMENT & PLAN NOTE
Change Q 3 Dilaudid to Q 2 p r n  Continue IV resuscitation with normal saline 250 cc an hour  Supportive care including p r n   Antiemetics  Appreciate GI assistance  Continue to Trend lipase  Monitor clinically  Continue NPO

## 2019-02-01 NOTE — NURSING NOTE
Pt relayed to [address] that she overheard her giving report to this RN and was upset about what she overheard  Naye krystin and I re-visited what our report was and the only thing we could think that might upset the patient was that Naye Magaña stated that she had had gastric bypass surgery but gained the initial weight loss back  Nayerogerio Magaña apologized to the patient for any discomfort that statement gave her, and I later visited the patient and apologized if anything stated during bedside report was offensive  I asked the patient if she would like to talk about it and she declined  At 0330, patient put her pump on hold and said she wanted a new bag of fluids, she couldn't 'trust' what was running in the bag  I honored that request and showed her that it was normal saline solution as ordered and showed her on the computer this order for her  I asked if something else was bothering her and she declined to speak further  I believe patient is struggling emotionally after she shared with me earlier in the week that she had spent $98910 on fertility treatments and she was never able to concieve  I believe patient could benefit from another psych consult to help her with any emotional issues and alcohol abuse  I don't feel she is equipped with proper coping skills if her alcohol abuse is repetitive and untreated  WCTM

## 2019-02-01 NOTE — PLAN OF CARE
Problem: PAIN - ADULT  Goal: Verbalizes/displays adequate comfort level or baseline comfort level  Interventions:  - Encourage patient to monitor pain and request assistance  - Assess pain using appropriate pain scale  - Administer analgesics based on type and severity of pain and evaluate response  - Implement non-pharmacological measures as appropriate and evaluate response  - Consider cultural and social influences on pain and pain management  - Notify physician/advanced practitioner if interventions unsuccessful or patient reports new pain   Outcome: Progressing      Problem: INFECTION - ADULT  Goal: Absence or prevention of progression during hospitalization  INTERVENTIONS:  - Assess and monitor for signs and symptoms of infection  - Monitor lab/diagnostic results  - Monitor all insertion sites, i e  indwelling lines, tubes, and drains  - Monitor endotracheal (as able) and nasal secretions for changes in amount and color  - Ono appropriate cooling/warming therapies per order  - Administer medications as ordered  - Instruct and encourage patient and family to use good hand hygiene technique  - Identify and instruct in appropriate isolation precautions for identified infection/condition   Outcome: Progressing    Goal: Absence of fever/infection during neutropenic period  INTERVENTIONS:  - Monitor WBC  - Implement neutropenic guidelines   Outcome: Progressing      Problem: DISCHARGE PLANNING  Goal: Discharge to home or other facility with appropriate resources  INTERVENTIONS:  - Identify barriers to discharge w/patient and caregiver  - Arrange for needed discharge resources and transportation as appropriate  - Identify discharge learning needs (meds, wound care, etc )  - Arrange for interpretive services to assist at discharge as needed  - Refer to Case Management Department for coordinating discharge planning if the patient needs post-hospital services based on physician/advanced practitioner order or complex needs related to functional status, cognitive ability, or social support system   Outcome: Progressing      Problem: Prexisting or High Potential for Compromised Skin Integrity  Goal: Skin integrity is maintained or improved  INTERVENTIONS:  - Identify patients at risk for skin breakdown  - Assess and monitor skin integrity  - Assess and monitor nutrition and hydration status  - Monitor labs (i e  albumin)  - Assess for incontinence   - Turn and reposition patient  - Assist with mobility/ambulation  - Relieve pressure over bony prominences  - Avoid friction and shearing  - Provide appropriate hygiene as needed including keeping skin clean and dry  - Evaluate need for skin moisturizer/barrier cream  - Collaborate with interdisciplinary team (i e  Nutrition, Rehabilitation, etc )   - Patient/family teaching   Outcome: Progressing      Problem: Nutrition/Hydration-ADULT  Goal: Nutrient/Hydration intake appropriate for improving, restoring or maintaining nutritional needs  Monitor and assess patient's nutrition/hydration status for malnutrition (ex- brittle hair, bruises, dry skin, pale skin and conjunctiva, muscle wasting, smooth red tongue, and disorientation)  Collaborate with interdisciplinary team and initiate plan and interventions as ordered  Monitor patient's weight and dietary intake as ordered or per policy  Utilize nutrition screening tool and intervene per policy  Determine patient's food preferences and provide high-protein, high-caloric foods as appropriate       INTERVENTIONS:  - Monitor oral intake, urinary output, labs, and treatment plans  - Assess nutrition and hydration status and recommend course of action  - Evaluate amount of meals eaten  - Assist patient with eating if necessary   - Allow adequate time for meals  - Recommend/ encourage appropriate diets, oral nutritional supplements, and vitamin/mineral supplements  - Order, calculate, and assess calorie counts as needed  - Recommend, monitor, and adjust tube feedings and TPN/PPN based on assessed needs  - Assess need for intravenous fluids  - Provide specific nutrition/hydration education as appropriate  - Include patient/family/caregiver in decisions related to nutrition   Outcome: Progressing      Problem: DISCHARGE PLANNING - CARE MANAGEMENT  Goal: Discharge to post-acute care or home with appropriate resources  INTERVENTIONS:  - Conduct assessment to determine patient/family and health care team treatment goals, and need for post-acute services based on payer coverage, community resources, and patient preferences, and barriers to discharge  - Address psychosocial, clinical, and financial barriers to discharge as identified in assessment in conjunction with the patient/family and health care team  - Arrange appropriate level of post-acute services according to patient's   needs and preference and payer coverage in collaboration with the physician and health care team  - Communicate with and update the patient/family, physician, and health care team regarding progress on the discharge plan  - Arrange appropriate transportation to post-acute venues   Outcome: Progressing

## 2019-02-01 NOTE — NURSING NOTE
Prior note alludes to beginning of auditory and visual disturbances  CIWA at currently 26 after rapid decline over the past 3 hours  Hallucinating, talking to someone who isn't here, disoriented to place, time and situation  Headache, agitation, restlessness, and sweating are noted  Ativan administered at 0457  Belongings checked for alcohol and drugs and ethanol drawn  Continuous observation will most likely be needed as she is a fall risk and is attempting to leave room  I am staying in the room with her to supervise until then  Supervisor notified  WCTM

## 2019-02-01 NOTE — PROGRESS NOTES
Patient resting comfortably until PCA and author had to reposition patient for better oxygenation  Patient became extremely agitated, combative and visibly distressed  Dr Asa Yang to room, witnessed patient's activity  Will await further direction

## 2019-02-01 NOTE — ASSESSMENT & PLAN NOTE
Patient now in withdrawal has received 2 mg Ativan 5 mg Haldol for treatment in soft restraints for altered mental status interfering with medical treatment  Continue IV thiamine and folate supplementation daily  Once tolerating p o  Will change this to p o   Route  Cessation counseled  Discussion with the patient reveals that this may be dual diagnosis an attempt to self treat with alcohol as results psychiatry consult has been placed for evaluation   [facility] protocol

## 2019-02-01 NOTE — PROGRESS NOTES
Patient transferred to [phone] and reported given to author  Patient restless, attempting to pull at lines,  CIWA still high despite 4mg Ativan given x2  Dr Jimenez Back aware that patient pulled IVS out    Will await further direction

## 2019-02-01 NOTE — PROGRESS NOTES
Patient continues to be agitated and under distress  ABG completed and Dr Danilo Ochoa aware  2mg IV Ativan ordered  Confirmed with pharmacy that patient has met her threshold at this time  Dr Danilo Ochoa made aware and ordered 5mg IV haldol  After Dr Danilo Ochoa spoke with pharmacy regarding haldol order, author ordered to give IV haldol with Dr Danilo Ochoa present

## 2019-02-01 NOTE — NURSING NOTE
Pt is sweating and uncomfortable  I turned down thermostat, offered ice, offered a fan and patient denied ice and fan  WCTM

## 2019-02-01 NOTE — PROGRESS NOTES
Progress Note - Benjaman Kanner 1975, 37 y o  female MRN: [id number]    Unit/Bed#: -02 Encounter: [id number]    Primary Care Provider: Lucy Romero MD   Date and time admitted to hospital: 1/30/2019  8:29 AM        * Acute pancreatitis   Assessment & Plan    Change Q 3 Dilaudid to Q 2 p r n  Continue IV resuscitation with normal saline 250 cc an hour  Supportive care including p r n  Antiemetics  Appreciate GI assistance  Continue to Trend lipase  Monitor clinically  Continue NPO     UTI (urinary tract infection)   Assessment & Plan    Culture ordered but not done  Empiric antibiotics  Monitor for leukocytosis with CBC in the a m  Continue IV aztreonam to treat empirically based on multiple allergies     Alcohol abuse   Assessment & Plan    Patient now in withdrawal has received 2 mg Ativan 5 mg Haldol for treatment in soft restraints for altered mental status interfering with medical treatment  Continue IV thiamine and folate supplementation daily  Once tolerating p o  Will change this to p o  Route  Cessation counseled  Discussion with the patient reveals that this may be dual diagnosis an attempt to self treat with alcohol as results psychiatry consult has been placed for evaluation   [facility] protocol     Cholelithiasis   Assessment & Plan    GI following  Trend LFTs  Patient altered from withdrawal, will hold on mrcp         VTE Pharmacologic Prophylaxis:   Pharmacologic: Heparin  Mechanical VTE Prophylaxis in Place: Yes    Patient Centered Rounds: I have performed bedside rounds with nursing staff today  Discussions with Specialists or Other Care Team Provider:       Education and Discussions with Family / Patient:      Time Spent for Care: 20 minutes  More than 50% of total time spent on counseling and coordination of care as described above      Current Length of Stay: 2 day(s)    Current Patient Status: Inpatient   Certification Statement: The patient will continue to require additional inpatient hospital stay due to alcohol withdrawal    Discharge Plan:      Code Status: Level 1 - Full Code      Subjective:   Patient started to hallucinate visually overnight was treated with multiple doses of Ativan IV as per [facility] protocol this a m  Patient confused agitated review of systems unreliable and impossible to obtain at this point    Objective:     Vitals:   Temp (24hrs), Av 3 °F (36 8 °C), Min:97 6 °F (36 4 °C), Max:99 1 °F (37 3 °C)    Temp:  [97 6 °F (36 4 °C)-99 1 °F (37 3 °C)] 97 6 °F (36 4 °C)  HR:  [] 112  Resp:  [20-24] 24  BP: (129-168)/() 150/106  SpO2:  [91 %-94 %] 93 %  Body mass index is 51 95 kg/m²  Input and Output Summary (last 24 hours): Intake/Output Summary (Last 24 hours) at 19 2445  Last data filed at 19 0732   Gross per 24 hour   Intake           4902 5 ml   Output              400 ml   Net           4502 5 ml       Physical Exam:     Physical Exam   Constitutional: She appears well-developed and well-nourished  No distress  HENT:   Head: Normocephalic and atraumatic  Right Ear: External ear normal    Left Ear: External ear normal    Nose: Nose normal    Eyes: Conjunctivae are normal  Right eye exhibits no discharge  Left eye exhibits no discharge  No scleral icterus  Neck: No JVD present  No tracheal deviation present  No thyromegaly present  Cardiovascular: Normal rate, regular rhythm, normal heart sounds and intact distal pulses  Exam reveals no gallop and no friction rub  No murmur heard  Pulmonary/Chest: Breath sounds normal  No stridor  No respiratory distress  She has no wheezes  She has no rales  Abdominal: Soft  Bowel sounds are normal  She exhibits no distension  There is no rebound and no guarding  Musculoskeletal: Normal range of motion  She exhibits no edema, tenderness or deformity  Neurological: She is alert  She exhibits normal muscle tone  Coordination normal    Skin: Skin is warm and dry  No rash noted   She is not diaphoretic  No erythema  No pallor  Psychiatric: She has a normal mood and affect  Her behavior is normal  Judgment and thought content normal    Nursing note and vitals reviewed  Additional Data:     Labs:      Results from last 7 days  Lab Units 02/01/19  0454   WBC Thousand/uL 14 76*   HEMOGLOBIN g/dL 13 2   HEMATOCRIT % 40 5   PLATELETS Thousands/uL 123*   NEUTROS PCT % 86*   LYMPHS PCT % 5*   MONOS PCT % 6   EOS PCT % 1       Results from last 7 days  Lab Units 02/01/19  0454   SODIUM mmol/L 138   POTASSIUM mmol/L 3 0*   CHLORIDE mmol/L 101   CO2 mmol/L 26   BUN mg/dL 9   CREATININE mg/dL 0 76   ANION GAP mmol/L 11   CALCIUM mg/dL 7 3*   ALBUMIN g/dL 2 8*   TOTAL BILIRUBIN mg/dL 2 90*   ALK PHOS U/L 89   ALT U/L 39   AST U/L 23   GLUCOSE RANDOM mg/dL 92           Results from last 7 days  Lab Units 02/01/19  0536   POC GLUCOSE mg/dl 105                   * I Have Reviewed All Lab Data Listed Above  * Additional Pertinent Lab Tests Reviewed:  [facility] Admission Reviewed    Imaging:    Imaging Reports Reviewed Today Include:    Imaging Personally Reviewed by Myself Includes:       Recent Cultures (last 7 days):           Last 24 Hours Medication List:     Current Facility-Administered Medications:  aztreonam 1,000 mg Intravenous Q8H Yusra Felipe MD Last Rate: 1,000 mg (02/01/19 0523)   haloperidol lactate 5 mg Intramuscular Q6H PRN Jared Valdivia MD    heparin (porcine) 5,000 Units Subcutaneous [facility] Yusra Felipe MD    HYDROmorphone 1 mg Intravenous Q2H PRN Yusra Felipe MD    magnesium sulfate 4 g Intravenous Once Yusra Felipe MD    ondansetron 4 mg Intravenous Q4H PRN Yusra Felipe MD    potassium chloride 20 mEq Intravenous Q2H Yusra Felipe MD    sodium chloride 150 mL/hr Intravenous Continuous WILLY Nair Last Rate: 150 mL/hr (02/01/19 7512)   IVPB builder  Intravenous Q24H Yusra Felipe MD Last Rate: 100 mL/hr at 01/31/19 8214 Today, Patient Was Seen By: Buffy Kaur MD    ** Please Note: Dictation voice to text software may have been used in the creation of this document   **

## 2019-02-01 NOTE — ASSESSMENT & PLAN NOTE
Culture ordered but not done  Empiric antibiotics  Monitor for leukocytosis with CBC in the a m    Continue IV aztreonam to treat empirically based on multiple allergies

## 2019-02-01 NOTE — CONSULTS
Consult - Jerzy [address] 37 y o  female MRN: [id number]  Unit/Bed#: -02 Encounter: [id number]    Physician Requesting Consult: Renée Curry MD    Reason for Consultation / Chief Complaint:  Alcohol withdrawal    History of Present Illness:  Akshat Wall is a 37 y o  female who presents to the hospital 2 days ago with abdominal pain and concern for acute pancreatitis  On imaging she has cholelithiasis without evidence of cholecystitis  During her evaluation she became agitated and hallucinating consistent with alcohol withdrawal   She is unable to give me any history at this time as she is quite agitated  Her family tells me that she has an extensive alcohol use and last week but 5 large bottles of vodka just for that week  She has been drinking more now than she has previously  She was hospitalized in October and had mild withdrawal symptoms but was not drinking as much as she is now  The patient is currently unable to tell me if she has any pain or trouble breathing  She does ask me to remove her face mask  She is asking everyone in her family to stop touching her  She asks me to take off her restraints because she cannot open the door for her family members to come in  She is very difficult to reorient  History obtained from mother and friend      Past Medical History:  Past Medical History:   Diagnosis Date    Asthma     Pancreatitis        Past Surgical History:  Past Surgical History:   Procedure Laterality Date    GASTRIC BYPASS         Past Family History:  Family History   Problem Relation Age of Onset    Alcohol abuse Maternal Grandfather        Social History:  History   Smoking Status    Never Smoker   Smokeless Tobacco    Never Used     History   Alcohol Use    3 0 oz/week    5 Shots of liquor per week     History   Drug Use No     Marital Status: /Civil Union      Home Medications:   Prior to Admission medications    Not on File       Inpatient Medications:  Scheduled Meds:    Current Facility-Administered Medications:  aztreonam 1,000 mg Intravenous Q8H Brit Chery MD Last Rate: 1,000 mg (19 1422)   dexmedetomidine 0 1-0 7 mcg/kg/hr Intravenous Titrated Peggi Sands, DO    dexmedetomidine (PRECEDEX) bolus 1 mcg/kg (Ideal) Intravenous Once Peggi Sands, DO Last Rate: 60 mcg ( 0389)   folic acid IVPB 1 mg Intravenous Once Peggi Sands, DO    haloperidol lactate 5 mg Intravenous Q6H PRN Brit Chery MD    heparin (porcine) 5,000 Units Subcutaneous [facility] Brit Chery MD    HYDROmorphone 1 mg Intravenous Q2H PRN Brit Chery MD    LORazepam        LORazepam 1 mg Intravenous Q6H Peggi Sands, DO    LORazepam 2 mg Intravenous Once Brit Chery MD    ondansetron 4 mg Intravenous Q4H PRN Brit Chery MD    sodium chloride 150 mL/hr Intravenous Continuous WILLY Nair Last Rate: 150 mL/hr (19 1452)   IVPB builder  Intravenous Q24H Brit Chery MD Last Rate: 100 mL/hr at 19 1628     Continuous Infusions:    dexmedetomidine 0 1-0 7 mcg/kg/hr    sodium chloride 150 mL/hr Last Rate: 150 mL/hr (19 1452)     PRN Meds:    haloperidol lactate 5 mg Q6H PRN   HYDROmorphone 1 mg Q2H PRN   ondansetron 4 mg Q4H PRN       Allergies: Allergies   Allergen Reactions    Augmentin [Amoxicillin-Pot Clavulanate]     Ciprofloxacin     Doxycycline     Latex     Penicillins        ROS:   Review of Systems   Unable to perform ROS: Mental status change       Vitals:  Vitals:    19 1356 19 1400 19 1500 19 1600   BP:  148/81 152/77 147/99   Pulse:  105 104 (!) 116   Resp:     Temp:       TempSrc:       SpO2: 95% 96% 93% 100%   Weight:       Height:         Temperature:   Temp (24hrs), Av °F (36 7 °C), Min:97 6 °F (36 4 °C), Max:98 4 °F (36 9 °C)    Current Temperature: 97 6 °F (36 4 °C)    Weights:   IBW: 59 3 kg  Body mass index is 51 95 kg/m²      Hemodynamic Monitoring:  N/A     Non-Invasive/Invasive Ventilation Settings:  Respiratory    Lab Data (Last 4 hours)    None         O2/Vent Data (Last 4 hours)    None              No results found for: PHART, SGQ0XQZ, PO2ART, XAB1ILA, L3JZMUMY, BEART, SOURCE  SpO2: SpO2: 100 %     Physical Exam:  Physical Exam   Constitutional: She appears well-developed and well-nourished  No distress  HENT:   Head: Normocephalic  Nose: Nose normal    Eyes: Right eye exhibits no discharge  No scleral icterus  Neck: Normal range of motion  No JVD present  No tracheal deviation present  Cardiovascular: Regular rhythm and normal heart sounds  Exam reveals no gallop and no friction rub  No murmur heard  Tachycardic   Pulmonary/Chest: Effort normal and breath sounds normal  She has no wheezes  She has no rales  She exhibits no tenderness  Tachypneic    Abdominal: Soft  Bowel sounds are normal  She exhibits distension  There is no tenderness  Large abdominal wall hernia   Musculoskeletal: Normal range of motion  She exhibits no edema  Neurological: No cranial nerve deficit  She is awake, at times able to follow simple commands, she moves all 4 extremities spontaneously without focal signs of deficit, she is quite agitated and is not oriented   Skin: Skin is warm  No rash noted  She is diaphoretic  No erythema         Labs:    Results from last 7 days  Lab Units 02/01/19  0454 01/31/19  0548 01/30/19  0908   WBC Thousand/uL 14 76* 14 43* 15 13*   HEMOGLOBIN g/dL 13 2 14 2 17 1*   HEMATOCRIT % 40 5 43 4 49 5*   PLATELETS Thousands/uL 123* 143* 204   NEUTROS PCT % 86* 88* 87*   MONOS PCT % 6 6 7       Results from last 7 days  Lab Units 02/01/19  1537 02/01/19  0454 01/31/19  0548 01/30/19  0908   SODIUM mmol/L  --  138 140 137   POTASSIUM mmol/L  --  3 0* 3 3* 4 7   CHLORIDE mmol/L  --  101 100 94*   CO2 mmol/L  --  26 30 29   CO2, I-STAT mmol/L 22  --   --   --    ANION GAP mmol/L  --  11 10 14*   BUN mg/dL  --  9 10 10 CREATININE mg/dL  --  0 76 0 92 0 94   CALCIUM mg/dL  --  7 3* 7 9* 10 1   ALT U/L  --  39 58 103*   AST U/L  --  23 41 117*   ALK PHOS U/L  --  89 86 106   ALBUMIN g/dL  --  2 8* 3 1* 3 9   TOTAL BILIRUBIN mg/dL  --  2 90* 1 60* 2 60*       Results from last 7 days  Lab Units 02/01/19  0506   MAGNESIUM mg/dL 1 0*                  ABG:No results found for: PHART, QZG3HDB, PO2ART, XXL3PRG, V2OXSJSZ, BEART, SOURCE  VBG:          Imaging:  CT abdomen pelvis:  Minimal bibasilar atelectasis, inflammation of pancreas  I have personally reviewed pertinent reports  and I have personally reviewed pertinent films in PACS  EKG:  Sinus tachycardia, rate 115,  This was personally reviewed by myself  Micro:  None        ______________________________________________________________________    Assessment and Plan:     1  Acute alcohol withdrawal syndrome-persistent elevated CIWA score with ongoing agitation/delirium despite 10 mg of IV Ativan, 15 mg of IV Haldol over the past 12 hours  · Start Precedex with IV bolus/loading dose followed by infusion at 1 0 micrograms/kilogram per hour  · Continue Ativan scheduled at 1 mg q 6 hours  · Thiamine, folic acid  · IV fluids  · Continue CIWA score every 2 hours  · Transfer to ICU for closer     2  Acute pancreatitis - likely secondary to alcohol vs cholelithiasis  · IVF   · Closely monitor electrolytes  · MRCP/ERCP once more clinically stable from withdrawal    3  Hypocalcemia (corrected calcium 8 2)  · Give calcium gluconate now    4  Transaminitis with stable hepatomegaly/steatosis on imaging- liver enzymes have improved to baseline      5  Acute hypoxic respiratory failure - most likely related to sleep apnea as she seems to desaturate mostly while sleeping  She does have some bibasilar atelectasis  · Continue supplemental oxygen to keep sats greater than 90%  · Encourage patient to take deep breaths  · Would hold off on BiPAP secondary to agitation    6   Reported history of UTI - UA without significant WBCs  · Discontinue antibiotics    7  ICU Care  · NPO for now      Counseling / Coordination of Care  Total Critical Care time spent 45 minutes excluding procedures, teaching and family updates  ______________________________________________________________________    VTE Pharmacologic Prophylaxis: Heparin  VTE Mechanical Prophylaxis: sequential compression device    Invasive lines and devices: Invasive Devices     Peripheral Intravenous Line            Peripheral IV 02/01/19 Right;Upper Arm less than 1 day                Code Status: Level 1 - Full Code  POA:    POLST:      Given critical illness, patient length of stay will require greater than two midnights  Portions of the record may have been created with voice recognition software  Occasional wrong word or "sound a like" substitutions may have occurred due to the inherent limitations of voice recognition software  Read the chart carefully and recognize, using context, where substitutions have occurred        Kirt Villafuerte DO    Consults

## 2019-02-02 ENCOUNTER — APPOINTMENT (INPATIENT)
Dept: RADIOLOGY | Facility: HOSPITAL | Age: 44
DRG: 411 | End: 2019-02-02
Payer: COMMERCIAL

## 2019-02-02 PROBLEM — E87.6 HYPOKALEMIA: Status: ACTIVE | Noted: 2019-02-02

## 2019-02-02 PROBLEM — F10.931 ALCOHOL WITHDRAWAL DELIRIUM (HCC): Status: ACTIVE | Noted: 2019-02-02

## 2019-02-02 PROBLEM — J96.01 ACUTE RESPIRATORY FAILURE WITH HYPOXIA (HCC): Status: ACTIVE | Noted: 2019-02-02

## 2019-02-02 PROBLEM — F10.231 ALCOHOL WITHDRAWAL DELIRIUM (HCC): Status: ACTIVE | Noted: 2019-02-02

## 2019-02-02 LAB
ALBUMIN SERPL BCP-MCNC: 2.4 G/DL (ref 3.5–5)
ALP SERPL-CCNC: 98 U/L (ref 46–116)
ALT SERPL W P-5'-P-CCNC: 27 U/L (ref 12–78)
ANION GAP SERPL CALCULATED.3IONS-SCNC: 12 MMOL/L (ref 4–13)
AST SERPL W P-5'-P-CCNC: 22 U/L (ref 5–45)
BASOPHILS # BLD AUTO: 0.05 THOUSANDS/ΜL (ref 0–0.1)
BASOPHILS NFR BLD AUTO: 0 % (ref 0–1)
BILIRUB SERPL-MCNC: 2 MG/DL (ref 0.2–1)
BUN SERPL-MCNC: 10 MG/DL (ref 5–25)
CALCIUM SERPL-MCNC: 7.6 MG/DL (ref 8.3–10.1)
CHLORIDE SERPL-SCNC: 104 MMOL/L (ref 100–108)
CO2 SERPL-SCNC: 25 MMOL/L (ref 21–32)
CREAT SERPL-MCNC: 0.65 MG/DL (ref 0.6–1.3)
EOSINOPHIL # BLD AUTO: 0.21 THOUSAND/ΜL (ref 0–0.61)
EOSINOPHIL NFR BLD AUTO: 2 % (ref 0–6)
ERYTHROCYTE [DISTWIDTH] IN BLOOD BY AUTOMATED COUNT: 11.8 % (ref 11.6–15.1)
GFR SERPL CREATININE-BSD FRML MDRD: 109 ML/MIN/1.73SQ M
GLUCOSE SERPL-MCNC: 78 MG/DL (ref 65–140)
GLUCOSE SERPL-MCNC: 85 MG/DL (ref 65–140)
HCT VFR BLD AUTO: 40.9 % (ref 34.8–46.1)
HGB BLD-MCNC: 13 G/DL (ref 11.5–15.4)
IMM GRANULOCYTES # BLD AUTO: 0.11 THOUSAND/UL (ref 0–0.2)
IMM GRANULOCYTES NFR BLD AUTO: 1 % (ref 0–2)
LYMPHOCYTES # BLD AUTO: 1.17 THOUSANDS/ΜL (ref 0.6–4.47)
LYMPHOCYTES NFR BLD AUTO: 10 % (ref 14–44)
MAGNESIUM SERPL-MCNC: 1.9 MG/DL (ref 1.6–2.6)
MCH RBC QN AUTO: 34.7 PG (ref 26.8–34.3)
MCHC RBC AUTO-ENTMCNC: 31.8 G/DL (ref 31.4–37.4)
MCV RBC AUTO: 109 FL (ref 82–98)
MONOCYTES # BLD AUTO: 0.91 THOUSAND/ΜL (ref 0.17–1.22)
MONOCYTES NFR BLD AUTO: 8 % (ref 4–12)
NEUTROPHILS # BLD AUTO: 9.69 THOUSANDS/ΜL (ref 1.85–7.62)
NEUTS SEG NFR BLD AUTO: 79 % (ref 43–75)
NRBC BLD AUTO-RTO: 0 /100 WBCS
PLATELET # BLD AUTO: 125 THOUSANDS/UL (ref 149–390)
PMV BLD AUTO: 11.8 FL (ref 8.9–12.7)
POTASSIUM SERPL-SCNC: 3.3 MMOL/L (ref 3.5–5.3)
PROT SERPL-MCNC: 6.8 G/DL (ref 6.4–8.2)
RBC # BLD AUTO: 3.75 MILLION/UL (ref 3.81–5.12)
SODIUM SERPL-SCNC: 141 MMOL/L (ref 136–145)
WBC # BLD AUTO: 12.14 THOUSAND/UL (ref 4.31–10.16)

## 2019-02-02 PROCEDURE — 99232 SBSQ HOSP IP/OBS MODERATE 35: CPT | Performed by: INTERNAL MEDICINE

## 2019-02-02 PROCEDURE — 83735 ASSAY OF MAGNESIUM: CPT | Performed by: INTERNAL MEDICINE

## 2019-02-02 PROCEDURE — 99233 SBSQ HOSP IP/OBS HIGH 50: CPT | Performed by: INTERNAL MEDICINE

## 2019-02-02 PROCEDURE — 99291 CRITICAL CARE FIRST HOUR: CPT | Performed by: INTERNAL MEDICINE

## 2019-02-02 PROCEDURE — 82948 REAGENT STRIP/BLOOD GLUCOSE: CPT

## 2019-02-02 PROCEDURE — 71045 X-RAY EXAM CHEST 1 VIEW: CPT

## 2019-02-02 PROCEDURE — 80053 COMPREHEN METABOLIC PANEL: CPT | Performed by: INTERNAL MEDICINE

## 2019-02-02 PROCEDURE — 85025 COMPLETE CBC W/AUTO DIFF WBC: CPT | Performed by: INTERNAL MEDICINE

## 2019-02-02 RX ORDER — POTASSIUM CHLORIDE 14.9 MG/ML
20 INJECTION INTRAVENOUS
Status: COMPLETED | OUTPATIENT
Start: 2019-02-02 | End: 2019-02-02

## 2019-02-02 RX ORDER — FUROSEMIDE 10 MG/ML
40 INJECTION INTRAMUSCULAR; INTRAVENOUS ONCE
Status: COMPLETED | OUTPATIENT
Start: 2019-02-02 | End: 2019-02-02

## 2019-02-02 RX ORDER — FUROSEMIDE 10 MG/ML
80 INJECTION INTRAMUSCULAR; INTRAVENOUS ONCE
Status: COMPLETED | OUTPATIENT
Start: 2019-02-02 | End: 2019-02-02

## 2019-02-02 RX ORDER — POTASSIUM CHLORIDE 20 MEQ/1
80 TABLET, EXTENDED RELEASE ORAL 2 TIMES DAILY
Status: DISCONTINUED | OUTPATIENT
Start: 2019-02-02 | End: 2019-02-02

## 2019-02-02 RX ORDER — LORAZEPAM 2 MG/ML
2 INJECTION INTRAMUSCULAR ONCE
Status: COMPLETED | OUTPATIENT
Start: 2019-02-02 | End: 2019-02-02

## 2019-02-02 RX ORDER — LORAZEPAM 2 MG/ML
2 INJECTION INTRAMUSCULAR EVERY 4 HOURS PRN
Status: DISCONTINUED | OUTPATIENT
Start: 2019-02-02 | End: 2019-02-04

## 2019-02-02 RX ADMIN — DEXMEDETOMIDINE HYDROCHLORIDE 0.9 MCG/KG/HR: 100 INJECTION, SOLUTION INTRAVENOUS at 07:04

## 2019-02-02 RX ADMIN — DEXMEDETOMIDINE HYDROCHLORIDE 0.9 MCG/KG/HR: 100 INJECTION, SOLUTION INTRAVENOUS at 09:19

## 2019-02-02 RX ADMIN — LORAZEPAM 2 MG: 2 INJECTION, SOLUTION INTRAMUSCULAR; INTRAVENOUS at 22:16

## 2019-02-02 RX ADMIN — DEXMEDETOMIDINE HYDROCHLORIDE 0.9 MCG/KG/HR: 100 INJECTION, SOLUTION INTRAVENOUS at 18:52

## 2019-02-02 RX ADMIN — POTASSIUM CHLORIDE 20 MEQ: 200 INJECTION, SOLUTION INTRAVENOUS at 18:52

## 2019-02-02 RX ADMIN — LORAZEPAM 2 MG: 2 INJECTION, SOLUTION INTRAMUSCULAR; INTRAVENOUS at 10:23

## 2019-02-02 RX ADMIN — FUROSEMIDE 40 MG: 10 INJECTION, SOLUTION INTRAMUSCULAR; INTRAVENOUS at 18:52

## 2019-02-02 RX ADMIN — HEPARIN SODIUM 5000 UNITS: 5000 INJECTION INTRAVENOUS; SUBCUTANEOUS at 05:05

## 2019-02-02 RX ADMIN — DEXMEDETOMIDINE HYDROCHLORIDE 0.9 MCG/KG/HR: 100 INJECTION, SOLUTION INTRAVENOUS at 20:09

## 2019-02-02 RX ADMIN — DEXMEDETOMIDINE HYDROCHLORIDE 0.9 MCG/KG/HR: 100 INJECTION, SOLUTION INTRAVENOUS at 05:19

## 2019-02-02 RX ADMIN — LORAZEPAM 1 MG: 2 INJECTION, SOLUTION INTRAMUSCULAR; INTRAVENOUS at 07:46

## 2019-02-02 RX ADMIN — DEXMEDETOMIDINE HYDROCHLORIDE 0.5 MCG/KG/HR: 100 INJECTION, SOLUTION INTRAVENOUS at 00:14

## 2019-02-02 RX ADMIN — SODIUM CHLORIDE 75 ML/HR: 0.9 INJECTION, SOLUTION INTRAVENOUS at 09:18

## 2019-02-02 RX ADMIN — LORAZEPAM 2 MG: 2 INJECTION, SOLUTION INTRAMUSCULAR; INTRAVENOUS at 12:39

## 2019-02-02 RX ADMIN — LORAZEPAM 1 MG: 2 INJECTION, SOLUTION INTRAMUSCULAR; INTRAVENOUS at 15:10

## 2019-02-02 RX ADMIN — FOLIC ACID: 5 INJECTION, SOLUTION INTRAMUSCULAR; INTRAVENOUS; SUBCUTANEOUS at 15:09

## 2019-02-02 RX ADMIN — DEXMEDETOMIDINE HYDROCHLORIDE 0.7 MCG/KG/HR: 100 INJECTION, SOLUTION INTRAVENOUS at 17:01

## 2019-02-02 RX ADMIN — HYDROMORPHONE HYDROCHLORIDE 1 MG: 1 INJECTION, SOLUTION INTRAMUSCULAR; INTRAVENOUS; SUBCUTANEOUS at 21:24

## 2019-02-02 RX ADMIN — HALOPERIDOL LACTATE 5 MG: 5 INJECTION, SOLUTION INTRAMUSCULAR at 05:44

## 2019-02-02 RX ADMIN — FUROSEMIDE 40 MG: 10 INJECTION, SOLUTION INTRAVENOUS at 09:56

## 2019-02-02 RX ADMIN — HYDROMORPHONE HYDROCHLORIDE 1 MG: 1 INJECTION, SOLUTION INTRAMUSCULAR; INTRAVENOUS; SUBCUTANEOUS at 17:05

## 2019-02-02 RX ADMIN — DEXMEDETOMIDINE HYDROCHLORIDE 0.3 MCG/KG/HR: 100 INJECTION, SOLUTION INTRAVENOUS at 11:40

## 2019-02-02 RX ADMIN — DEXMEDETOMIDINE HYDROCHLORIDE 1.1 MCG/KG/HR: 100 INJECTION, SOLUTION INTRAVENOUS at 22:55

## 2019-02-02 RX ADMIN — FUROSEMIDE 80 MG: 10 INJECTION, SOLUTION INTRAMUSCULAR; INTRAVENOUS at 17:17

## 2019-02-02 RX ADMIN — HEPARIN SODIUM 5000 UNITS: 5000 INJECTION INTRAVENOUS; SUBCUTANEOUS at 15:09

## 2019-02-02 RX ADMIN — LORAZEPAM 1 MG: 2 INJECTION, SOLUTION INTRAMUSCULAR; INTRAVENOUS at 20:06

## 2019-02-02 RX ADMIN — POTASSIUM CHLORIDE 20 MEQ: 200 INJECTION, SOLUTION INTRAVENOUS at 21:28

## 2019-02-02 RX ADMIN — SODIUM CHLORIDE 150 ML/HR: 0.9 INJECTION, SOLUTION INTRAVENOUS at 01:31

## 2019-02-02 RX ADMIN — DEXMEDETOMIDINE HYDROCHLORIDE 0.5 MCG/KG/HR: 100 INJECTION, SOLUTION INTRAVENOUS at 03:00

## 2019-02-02 RX ADMIN — HEPARIN SODIUM 5000 UNITS: 5000 INJECTION INTRAVENOUS; SUBCUTANEOUS at 21:24

## 2019-02-02 RX ADMIN — LORAZEPAM 1 MG: 2 INJECTION, SOLUTION INTRAMUSCULAR; INTRAVENOUS at 01:59

## 2019-02-02 NOTE — ASSESSMENT & PLAN NOTE
Probably asymptomatic and not related to current pancreatitis although w/ intermittent RUQ pain still could be playing role in current symptoms and pancreatitis    Plan:  Follow symptoms  If persistent right upper quadrant pain would recheck abdominal ultrasound    May re-entertain MRCP being done as an inpatient

## 2019-02-02 NOTE — PROGRESS NOTES
[facility]  [id number]    37 y o   female      ASSESSMENT and PLAN    Alcohol withdrawal delirium ([address] )   Assessment & Plan    EtOH Withdraw protocol per ICU team     Cholelithiasis   Assessment & Plan    Probably asymptomatic and not related to current pancreatitis  Plan:  Follow symptoms  Can consider MRCP for completeness sake to completely exclude a small common bile duct stone at a later date     * Acute pancreatitis   Assessment & Plan    Pancreatitis probably secondary to EtOH  Although has gallstones the severity of her current EtOH withdraw points toward significance of her EtOH intake    Plan:  Advance diet once mental status improves so she can safely take PO's   IVF  Needs to stop drinking  Stressed this w/ family         Chief Complaint   Patient presents with    Abdominal Pain     Patient presents to the ER stating she started with RUQ abdominal pain yesterday, has history of gall stones       SUBJECTIVE/HPI less agitated    Nods head to questions    /89   Pulse 75   Temp 99 °F (37 2 °C) (Tympanic)   Resp (!) 30   Ht 5' 6" (1 676 m)   Wt (!) 146 kg (321 lb 14 oz)   LMP 01/16/2019   SpO2 90%   BMI 51 95 kg/m²     PHYSICALEXAM  General appearance:  Somnolent but less agitated  Head: Normocephalic, without obvious abnormality, atraumatic  Lungs: clear to auscultation bilaterally  Heart: regular rate and rhythm, S1, S2 normal, no murmur, click, rub or gallop  Abdomen: soft, non-tender; bowel sounds hypoactive; no masses,  no organomegaly  Extremities: extremities normal, atraumatic, no cyanosis or edema  Neurologic: Grossly normal    Lab Results   Component Value Date    CALCIUM 7 6 (L) 02/02/2019    K 3 3 (L) 02/02/2019    CO2 25 02/02/2019     02/02/2019    BUN 10 02/02/2019    CREATININE 0 65 02/02/2019     Lab Results   Component Value Date    WBC 12 14 (H) 02/02/2019    HGB 13 0 02/02/2019    HCT 40 9 02/02/2019     (H) 02/02/2019     (L) 02/02/2019 Lab Results   Component Value Date    ALT 27 02/02/2019    AST 22 02/02/2019    ALKPHOS 98 02/02/2019     Lab Results   Component Value Date    AMYLASE 33 09/27/2018     Lab Results   Component Value Date    LIPASE 213 02/01/2019     No results found for: IRON, TIBC, FERRITIN  No results found for: INR    Counseling / Coordination of Care  Total floor / unit time spent today 30 minutes

## 2019-02-02 NOTE — PROGRESS NOTES
Progress Note - Sanjana Rising 1975, 37 y o  female MRN: [id number]    Unit/Bed#: -02 Encounter: [id number]    Primary Care Provider: Padmaja Mercado MD   Date and time admitted to hospital: 1/30/2019  8:29 AM        * Acute pancreatitis   Assessment & Plan    Prn pain control  Change IV fluid rate to 75 cc/hr  Supportive care including p r n  Antiemetics  Lipase has normalized   Patient altered from EtOH withdrawal, difficult to assess clinical manifestations of pancreatitis   Continue NPO  Total bilirubin still elevated, recheck in AM  GI following but MRCP now on hold due to patient's withdrawal symptoms      Hypokalemia   Assessment & Plan    Supplement K+ IV  Recheck K+ in AM  Telemetry monitoring     UTI (urinary tract infection)   Assessment & Plan    Original sample not sent for culture, rechecked UA with reflex UC  Continue Empiric antibiotics   CBC in AM  Leukocytosis resolving       Alcohol abuse   Assessment & Plan    Patient now in withdrawal on Precedex drip  Critical care following, appreciate their assistance  Continue IV thiamine and folate supplementation daily  Once tolerating p o  Will change this to p o  Route  Cessation counseled  Discussion with the patient reveals that this may be dual diagnosis an attempt to self treat with alcohol as results psychiatry consult has been placed for evaluation   [facility] protocol         VTE Pharmacologic Prophylaxis:   Pharmacologic: Heparin  Mechanical VTE Prophylaxis in Place: Yes    Patient Centered Rounds: I have performed bedside rounds with nursing staff today  Discussions with Specialists or Other Care Team Provider:      Education and Discussions with Family / Patient:      Time Spent for Care: 20 minutes  More than 50% of total time spent on counseling and coordination of care as described above      Current Length of Stay: 3 day(s)    Current Patient Status: Inpatient   Certification Statement: The patient will continue to require additional inpatient hospital stay due to treatment of withdrawal    Discharge Plan:      Code Status: Level 1 - Full Code      Subjective:   Patient on precedex drip overnight     Objective:     Vitals:   Temp (24hrs), Av 1 °F (37 8 °C), Min:100 1 °F (37 8 °C), Max:100 1 °F (37 8 °C)    Temp:  [100 1 °F (37 8 °C)] 100 1 °F (37 8 °C)  HR:  [] 67  Resp:  [18-34] 29  BP: (120-170)/(63-99) 147/81  SpO2:  [69 %-100 %] 94 %  Body mass index is 51 95 kg/m²  Input and Output Summary (last 24 hours): Intake/Output Summary (Last 24 hours) at 19 08  Last data filed at 19 0815   Gross per 24 hour   Intake          3509 17 ml   Output             1508 ml   Net           17 ml       Physical Exam:     Physical Exam   Constitutional: She appears well-developed and well-nourished  No distress  HENT:   Head: Normocephalic and atraumatic  Right Ear: External ear normal    Left Ear: External ear normal    Nose: Nose normal    Neck: No JVD present  No tracheal deviation present  No thyromegaly present  Cardiovascular: Normal rate, regular rhythm, normal heart sounds and intact distal pulses  Exam reveals no gallop and no friction rub  No murmur heard  Pulmonary/Chest: Breath sounds normal  No stridor  No respiratory distress  She has no wheezes  She has no rales  +Tachypnea, shallow breaths   Abdominal: Soft  Bowel sounds are normal  She exhibits no distension  There is no tenderness  There is no rebound and no guarding  reducible Ventral hernia   Musculoskeletal: Normal range of motion  She exhibits no edema, tenderness or deformity  Neurological:   sedated   Skin: Skin is warm and dry  No rash noted  She is not diaphoretic  No erythema  No pallor  Nursing note and vitals reviewed          Additional Data:     Labs:      Results from last 7 days  Lab Units 19  0504   WBC Thousand/uL 12 14*   HEMOGLOBIN g/dL 13 0   HEMATOCRIT % 40 9   PLATELETS Thousands/uL 125*   NEUTROS PCT % 79*   LYMPHS PCT % 10*   MONOS PCT % 8   EOS PCT % 2       Results from last 7 days  Lab Units 02/02/19  0504   SODIUM mmol/L 141   POTASSIUM mmol/L 3 3*   CHLORIDE mmol/L 104   CO2 mmol/L 25   BUN mg/dL 10   CREATININE mg/dL 0 65   ANION GAP mmol/L 12   CALCIUM mg/dL 7 6*   ALBUMIN g/dL 2 4*   TOTAL BILIRUBIN mg/dL 2 00*   ALK PHOS U/L 98   ALT U/L 27   AST U/L 22   GLUCOSE RANDOM mg/dL 78           Results from last 7 days  Lab Units 02/02/19  0419 02/01/19  1245 02/01/19  0536   POC GLUCOSE mg/dl 85 92 105                   * I Have Reviewed All Lab Data Listed Above  * Additional Pertinent Lab Tests Reviewed: [address] Admission Reviewed    Imaging:    Imaging Reports Reviewed Today Include:    Imaging Personally Reviewed by Myself Includes:       Recent Cultures (last 7 days):           Last 24 Hours Medication List:     Current Facility-Administered Medications:  dexmedetomidine 0 1-0 7 mcg/kg/hr Intravenous Titrated Estrellita Laws DO Last Rate: 0 9 mcg/kg/hr (02/02/19 0704)   haloperidol lactate 5 mg Intravenous Q6H PRN Haleigh Garcia MD    heparin (porcine) 5,000 Units Subcutaneous On license of [facility] Haleigh Garcia MD    HYDROmorphone 1 mg Intravenous Q2H PRN Haleigh Garcia MD    LORazepam 1 mg Intravenous Q6H Estrellita Laws DO    LORazepam 2 mg Intravenous Once Haleigh Garcia MD    ondansetron 4 mg Intravenous Q4H PRN Haleigh Garcia MD    sodium chloride 75 mL/hr Intravenous Continuous Haleigh Garcia MD Last Rate: 75 mL/hr (02/02/19 0815)   IVPB builder  Intravenous Q24H Haleigh Garcia MD Last Rate: 100 mL/hr at 02/01/19 1628        Today, Patient Was Seen By: Haleigh Garcia MD    ** Please Note: Dictation voice to text software may have been used in the creation of this document   **

## 2019-02-02 NOTE — QUICK NOTE
Patient was weaned from Precedex drip this a m On awakening as a result of weaning the drip, patient is very confused disoriented and agitated  I am requesting the Precedex drip not to be down titrated again unless cleared by myself  The patient is in the middle of some significant alcohol withdrawal  Family in the room at bedside and upset, reassurance given  Also given 2 mg Ativan x1 now to allow for re-sedation using Precedex drip

## 2019-02-02 NOTE — PROGRESS NOTES
Progress Note - Critical Care   Elenita Malone 37 y o  female MRN: [id number]  Unit/Bed#: -02 Encounter: [id number]    Attending Physician: Kevin Camarillo MD    Pulmonary Consultant: Dominick Bishop MD      ______________________________________________________________________  Assessment and Plan:   Principal Problem:    Acute pancreatitis  Active Problems:    Cholelithiasis    Morbid obesity ([address] )    Alcohol abuse    UTI (urinary tract infection)    Hypokalemia  Resolved Problems:    * No resolved hospital problems  *    Pancreatitis: lipase has decreased as of 1/31  No abdominal tenderness    Alcohol withdrawal: Seems to answer questions appropriately  Still on Precedex infusion  Wean of infusion at least once daily, to see if it can be discontinued  Continue Ativan prn  Respiratory status OK      Neuro: Somnolent on Precedex drip, but responds to questions    CV: RR, no murmurs    Pulm: Clear anteriorly     GI: Non-tender    ID: No evidence of infection    Heme: Elevated WBC likely secondary to pancreatitis    Endo: Glucose normal     Msk/Skin: Unremarkable      Code Status: Level 1 - Full Code      Dominick Bishop MD    Addendum: Continue Precedex at current rate and use Ativan 2 mg Q4 hrs PRN

## 2019-02-02 NOTE — ASSESSMENT & PLAN NOTE
Prn pain control  Change IV fluid rate to 75 cc/hr  Supportive care including p r n   Antiemetics  Lipase has normalized   Patient altered from EtOH withdrawal, difficult to assess clinical manifestations of pancreatitis   Continue NPO  Total bilirubin still elevated, recheck in AM  GI following but MRCP now on hold due to patient's withdrawal symptoms

## 2019-02-02 NOTE — ASSESSMENT & PLAN NOTE
Patient now in withdrawal on Precedex drip  Critical care following, appreciate their assistance  Continue IV thiamine and folate supplementation daily  Once tolerating p o  Will change this to p o   Route  Cessation counseled  Discussion with the patient reveals that this may be dual diagnosis an attempt to self treat with alcohol as results psychiatry consult has been placed for evaluation   [facility] protocol

## 2019-02-02 NOTE — PROGRESS NOTES
Reevaluated pt: shortness of breath maybe slightly better, RR is in the high 20s, still on oxygen via NC at 15 L/min with O2 sat of 96%  Pt urinated 1000ml since last lasix dose  She's still [phone] in terms of I/O since admission  Pt's sleepy on precedex but easily arouses, in mild respiratory distress  Lungs not much change in the b/l inspiratory crackles, heart RRR, abdomen no epigastric tenderness  K+ was 3 3 this am     A/p  1  Acute hypoxic respiratory failure- continue O2 at 15 L/min  2  Acute volume overload - will give lasix 40mg ivx1 again  3  Acute pancreatitis improving,lipase is normal, pt has no tenderness  She requests diet to be started, will try clear liquids  4  Hypokalemia will replace it with 40 mequ of iv KCL stat  5  Alcohol withdrawal continue precedex  D/w mother at bedside    Critical care time 32 minutes today

## 2019-02-02 NOTE — PROGRESS NOTES
Called by RN to evaluate pt for dyspnea with RR in the 30s, hypoxia needing oxygen  Pt developed dyspnea last night with increasing oxygen demand  Has been NPO and on IVF for acute alcoholic pancreatitis, is on precedex for sedation in the setting of severe alcohol withdrawal, has had no seizures  Pt has been on sc heparin for dvt prevention  Pt's mother reports hx of asthma  Pt reports shortness of breath, no chest pain or pleurisy  Denies abdominal pain or nausea  SBP is in the 150s, RR in the 30s as high as 53, HR in the 70s sinus rhythm on the monitor  Tmax 100 3  Slightly sedated but answers appropriately and follows commands  Is in mild respiratory distress  Lungs reveal b/l inspiratory crackles and mild expiratory wheezing  Heart RRR, abdomen is non-tender at present  Skin dry with no LE edema  Neuro no resting tremor is present  A/p  1  Acute hypoxic respiratory failure due to #2  2  Acute volume overload due to ivf  - I personally reviewed cxr done today  3  HTN  4  Acute pancreatitis with normal lipase this am  5  Acute alcohol withdrawal on precedex with controlled symptoms    Plan:  Lasix 80mg IV x1 stat  Stop ivf  Will reasses pt: may need more lasix, start iv nitroglycerin?

## 2019-02-02 NOTE — ASSESSMENT & PLAN NOTE
Pancreatitis probably secondary to EtOH  Although she has gallstones, the severity of her current EtOH withdraw points toward significance of her EtOH intake    Plan:  Advance diet once mental status improves so she can safely take PO's  IVF stopped  Needs to stop drinking

## 2019-02-02 NOTE — ASSESSMENT & PLAN NOTE
Original sample not sent for culture, rechecked UA with reflex UC  Continue Empiric antibiotics   CBC in AM  Leukocytosis resolving

## 2019-02-03 LAB
ALBUMIN SERPL BCP-MCNC: 2.3 G/DL (ref 3.5–5)
ALP SERPL-CCNC: 121 U/L (ref 46–116)
ALT SERPL W P-5'-P-CCNC: 23 U/L (ref 12–78)
ANION GAP SERPL CALCULATED.3IONS-SCNC: 13 MMOL/L (ref 4–13)
ARTERIAL PATENCY WRIST A: ABNORMAL
AST SERPL W P-5'-P-CCNC: 21 U/L (ref 5–45)
BASE EXCESS BLDA CALC-SCNC: 1 MMOL/L (ref -2–3)
BASOPHILS # BLD AUTO: 0.06 THOUSANDS/ΜL (ref 0–0.1)
BASOPHILS NFR BLD AUTO: 1 % (ref 0–1)
BILIRUB SERPL-MCNC: 1.8 MG/DL (ref 0.2–1)
BUN SERPL-MCNC: 13 MG/DL (ref 5–25)
CALCIUM SERPL-MCNC: 7.7 MG/DL (ref 8.3–10.1)
CHLORIDE SERPL-SCNC: 104 MMOL/L (ref 100–108)
CO2 SERPL-SCNC: 27 MMOL/L (ref 21–32)
CREAT SERPL-MCNC: 0.69 MG/DL (ref 0.6–1.3)
DS:DELIVERY SYSTEM: ABNORMAL
EOSINOPHIL # BLD AUTO: 0.2 THOUSAND/ΜL (ref 0–0.61)
EOSINOPHIL NFR BLD AUTO: 2 % (ref 0–6)
ERYTHROCYTE [DISTWIDTH] IN BLOOD BY AUTOMATED COUNT: 11.6 % (ref 11.6–15.1)
GFR SERPL CREATININE-BSD FRML MDRD: 107 ML/MIN/1.73SQ M
GLUCOSE SERPL-MCNC: 96 MG/DL (ref 65–140)
HCO3 BLDA-SCNC: 25.9 MMOL/L (ref 22–28)
HCT VFR BLD AUTO: 38.3 % (ref 34.8–46.1)
HGB BLD-MCNC: 12.9 G/DL (ref 11.5–15.4)
IMM GRANULOCYTES # BLD AUTO: 0.14 THOUSAND/UL (ref 0–0.2)
IMM GRANULOCYTES NFR BLD AUTO: 2 % (ref 0–2)
LYMPHOCYTES # BLD AUTO: 1.23 THOUSANDS/ΜL (ref 0.6–4.47)
LYMPHOCYTES NFR BLD AUTO: 13 % (ref 14–44)
MAGNESIUM SERPL-MCNC: 1.7 MG/DL (ref 1.6–2.6)
MCH RBC QN AUTO: 35.3 PG (ref 26.8–34.3)
MCHC RBC AUTO-ENTMCNC: 33.7 G/DL (ref 31.4–37.4)
MCV RBC AUTO: 105 FL (ref 82–98)
MONOCYTES # BLD AUTO: 0.8 THOUSAND/ΜL (ref 0.17–1.22)
MONOCYTES NFR BLD AUTO: 9 % (ref 4–12)
NEUTROPHILS # BLD AUTO: 6.84 THOUSANDS/ΜL (ref 1.85–7.62)
NEUTS SEG NFR BLD AUTO: 73 % (ref 43–75)
NRBC BLD AUTO-RTO: 0 /100 WBCS
PCO2 BLD: 27 MMOL/L (ref 21–32)
PCO2 BLD: 40.1 MM HG (ref 36–44)
PH BLD: 7.42 [PH] (ref 7.35–7.45)
PLATELET # BLD AUTO: 158 THOUSANDS/UL (ref 149–390)
PMV BLD AUTO: 11.8 FL (ref 8.9–12.7)
PO2 BLD: 50 MM HG (ref 75–129)
POTASSIUM SERPL-SCNC: 3 MMOL/L (ref 3.5–5.3)
PROT SERPL-MCNC: 7.1 G/DL (ref 6.4–8.2)
RBC # BLD AUTO: 3.65 MILLION/UL (ref 3.81–5.12)
SAMPLE SITE: ABNORMAL
SAO2 % BLD FROM PO2: 86 % (ref 95–98)
SODIUM SERPL-SCNC: 144 MMOL/L (ref 136–145)
SPECIMEN SOURCE: ABNORMAL
WBC # BLD AUTO: 9.27 THOUSAND/UL (ref 4.31–10.16)

## 2019-02-03 PROCEDURE — 82803 BLOOD GASES ANY COMBINATION: CPT

## 2019-02-03 PROCEDURE — 99232 SBSQ HOSP IP/OBS MODERATE 35: CPT | Performed by: INTERNAL MEDICINE

## 2019-02-03 PROCEDURE — 94640 AIRWAY INHALATION TREATMENT: CPT

## 2019-02-03 PROCEDURE — 83735 ASSAY OF MAGNESIUM: CPT | Performed by: INTERNAL MEDICINE

## 2019-02-03 PROCEDURE — 85025 COMPLETE CBC W/AUTO DIFF WBC: CPT | Performed by: INTERNAL MEDICINE

## 2019-02-03 PROCEDURE — 99233 SBSQ HOSP IP/OBS HIGH 50: CPT | Performed by: INTERNAL MEDICINE

## 2019-02-03 PROCEDURE — 36600 WITHDRAWAL OF ARTERIAL BLOOD: CPT

## 2019-02-03 PROCEDURE — 94760 N-INVAS EAR/PLS OXIMETRY 1: CPT

## 2019-02-03 PROCEDURE — 80053 COMPREHEN METABOLIC PANEL: CPT | Performed by: INTERNAL MEDICINE

## 2019-02-03 RX ORDER — ECHINACEA PURPUREA EXTRACT 125 MG
1 TABLET ORAL
Status: DISCONTINUED | OUTPATIENT
Start: 2019-02-03 | End: 2019-02-08 | Stop reason: HOSPADM

## 2019-02-03 RX ORDER — HEPARIN SODIUM 5000 [USP'U]/ML
5000 INJECTION, SOLUTION INTRAVENOUS; SUBCUTANEOUS EVERY 8 HOURS SCHEDULED
Status: DISCONTINUED | OUTPATIENT
Start: 2019-02-03 | End: 2019-02-03

## 2019-02-03 RX ORDER — ALBUTEROL SULFATE 2.5 MG/3ML
2.5 SOLUTION RESPIRATORY (INHALATION) ONCE
Status: COMPLETED | OUTPATIENT
Start: 2019-02-03 | End: 2019-02-03

## 2019-02-03 RX ORDER — TRAMADOL HYDROCHLORIDE 50 MG/1
50 TABLET ORAL EVERY 6 HOURS PRN
Status: DISCONTINUED | OUTPATIENT
Start: 2019-02-03 | End: 2019-02-04

## 2019-02-03 RX ORDER — POTASSIUM CHLORIDE 14.9 MG/ML
20 INJECTION INTRAVENOUS
Status: COMPLETED | OUTPATIENT
Start: 2019-02-03 | End: 2019-02-04

## 2019-02-03 RX ORDER — CEFTRIAXONE 1 G/50ML
1000 INJECTION, SOLUTION INTRAVENOUS EVERY 24 HOURS
Status: DISCONTINUED | OUTPATIENT
Start: 2019-02-03 | End: 2019-02-04

## 2019-02-03 RX ORDER — FUROSEMIDE 10 MG/ML
40 INJECTION INTRAMUSCULAR; INTRAVENOUS ONCE
Status: COMPLETED | OUTPATIENT
Start: 2019-02-03 | End: 2019-02-03

## 2019-02-03 RX ADMIN — LORAZEPAM 1 MG: 2 INJECTION, SOLUTION INTRAMUSCULAR; INTRAVENOUS at 01:27

## 2019-02-03 RX ADMIN — FOLIC ACID: 5 INJECTION, SOLUTION INTRAMUSCULAR; INTRAVENOUS; SUBCUTANEOUS at 15:38

## 2019-02-03 RX ADMIN — FUROSEMIDE 40 MG: 10 INJECTION, SOLUTION INTRAMUSCULAR; INTRAVENOUS at 08:15

## 2019-02-03 RX ADMIN — LORAZEPAM 1 MG: 2 INJECTION, SOLUTION INTRAMUSCULAR; INTRAVENOUS at 14:13

## 2019-02-03 RX ADMIN — CEFTRIAXONE 1000 MG: 1 INJECTION, SOLUTION INTRAVENOUS at 14:12

## 2019-02-03 RX ADMIN — LORAZEPAM 1 MG: 2 INJECTION, SOLUTION INTRAMUSCULAR; INTRAVENOUS at 08:15

## 2019-02-03 RX ADMIN — ALBUTEROL SULFATE 2.5 MG: 2.5 SOLUTION RESPIRATORY (INHALATION) at 08:25

## 2019-02-03 RX ADMIN — HYDROMORPHONE HYDROCHLORIDE 1 MG: 1 INJECTION, SOLUTION INTRAMUSCULAR; INTRAVENOUS; SUBCUTANEOUS at 15:29

## 2019-02-03 RX ADMIN — METRONIDAZOLE 500 MG: 500 INJECTION, SOLUTION INTRAVENOUS at 19:20

## 2019-02-03 RX ADMIN — DEXMEDETOMIDINE HYDROCHLORIDE 0.9 MCG/KG/HR: 100 INJECTION, SOLUTION INTRAVENOUS at 03:10

## 2019-02-03 RX ADMIN — POTASSIUM CHLORIDE 20 MEQ: 200 INJECTION, SOLUTION INTRAVENOUS at 08:14

## 2019-02-03 RX ADMIN — LORAZEPAM 2 MG: 2 INJECTION, SOLUTION INTRAMUSCULAR; INTRAVENOUS at 10:25

## 2019-02-03 RX ADMIN — POTASSIUM CHLORIDE 20 MEQ: 200 INJECTION, SOLUTION INTRAVENOUS at 11:51

## 2019-02-03 RX ADMIN — ONDANSETRON 4 MG: 2 INJECTION INTRAMUSCULAR; INTRAVENOUS at 23:16

## 2019-02-03 RX ADMIN — DEXMEDETOMIDINE HYDROCHLORIDE 0.9 MCG/KG/HR: 100 INJECTION, SOLUTION INTRAVENOUS at 01:17

## 2019-02-03 RX ADMIN — METRONIDAZOLE 500 MG: 500 INJECTION, SOLUTION INTRAVENOUS at 12:06

## 2019-02-03 RX ADMIN — HEPARIN SODIUM 5000 UNITS: 5000 INJECTION INTRAVENOUS; SUBCUTANEOUS at 05:55

## 2019-02-03 RX ADMIN — LORAZEPAM 1 MG: 2 INJECTION, SOLUTION INTRAMUSCULAR; INTRAVENOUS at 19:20

## 2019-02-03 RX ADMIN — TRAMADOL HYDROCHLORIDE 50 MG: 50 TABLET, COATED ORAL at 19:19

## 2019-02-03 RX ADMIN — DEXMEDETOMIDINE HYDROCHLORIDE 0.7 MCG/KG/HR: 100 INJECTION, SOLUTION INTRAVENOUS at 05:52

## 2019-02-03 RX ADMIN — Medication 1 SPRAY: at 16:32

## 2019-02-03 RX ADMIN — HEPARIN SODIUM 5000 UNITS: 5000 INJECTION INTRAVENOUS; SUBCUTANEOUS at 14:12

## 2019-02-03 RX ADMIN — HYDROMORPHONE HYDROCHLORIDE 1 MG: 1 INJECTION, SOLUTION INTRAMUSCULAR; INTRAVENOUS; SUBCUTANEOUS at 09:17

## 2019-02-03 RX ADMIN — HEPARIN SODIUM 5000 UNITS: 5000 INJECTION INTRAVENOUS; SUBCUTANEOUS at 21:48

## 2019-02-03 NOTE — ASSESSMENT & PLAN NOTE
Supplement K+ IV  Recheck K+ in AM with CMP  Telemetry monitoring for life-threatening arrhythmias that may arise from hypokalemia

## 2019-02-03 NOTE — ASSESSMENT & PLAN NOTE
Chest x-ray showed pulmonary edema  Treated with IV Lasix yesterday  O2 sat monitoring with supplemental O2 as needed  Patient still with significant tachypnea  Will check an ABG

## 2019-02-03 NOTE — PROGRESS NOTES
Progress Note - Rand Pena 1975, 37 y o  female MRN: [id number]    Unit/Bed#: -02 Encounter: [id number]    Primary Care Provider: Donell Meckel, MD   Date and time admitted to hospital: 1/30/2019  8:29 AM        * Acute pancreatitis   Assessment & Plan    Prn pain control  D/c IV fluids  Supportive care including p r n  Antiemetics  Lipase has normalized   Patient altered from EtOH withdrawal, difficult to assess clinical manifestations of pancreatitis   Continue NPO  Total bilirubin still elevated, recheck in AM  GI following but MRCP now on hold due to patient's withdrawal symptoms      Acute respiratory failure with hypoxemia [facility])   Assessment & Plan    Chest x-ray showed pulmonary edema  Treated with IV Lasix yesterday  O2 sat monitoring with supplemental O2 as needed  Patient still with significant tachypnea  Will check an ABG     Hypokalemia   Assessment & Plan    Supplement K+ IV  Recheck K+ in AM with CMP  Telemetry monitoring for life-threatening arrhythmias that may arise from hypokalemia     UTI (urinary tract infection)   Assessment & Plan    Original sample not sent for culture, rechecked UA with reflex UC  Continue Empiric antibiotics   CBC in AM  Leukocytosis resolving       Alcohol abuse   Assessment & Plan    Patient now in withdrawal on Precedex drip  Critical care following, appreciate their assistance  Continue IV thiamine and folate supplementation daily  Once tolerating p o  Will change this to p o   Route  Cessation counseled  Discussion with the patient reveals that this may be dual diagnosis an attempt to self treat with alcohol as results psychiatry consult has been placed for evaluation   [facility] protocol     Cholelithiasis   Assessment & Plan    GI following  Trend LFTs  MRCP currently on hold due to patient's altered mentation         VTE Pharmacologic Prophylaxis:   Pharmacologic: Heparin  Mechanical VTE Prophylaxis in Place: Yes    Patient Centered Rounds: I have performed bedside rounds with nursing staff today  Discussions with Specialists or Other Care Team Provider:  Gastroenterology    Education and Discussions with Family / Patient:      Time Spent for Care: 20 minutes  More than 50% of total time spent on counseling and coordination of care as described above  Current Length of Stay: 4 day(s)    Current Patient Status: Inpatient   Certification Statement: The patient will continue to require additional inpatient hospital stay due to Treatment of alcohol withdrawal, acute pancreatitis    Discharge Plan:  Current plan is discharge home once medically stable    Code Status: Level 1 - Full Code      Subjective:   Patient tachypneic overnight given multiple rounds of Lasix  This a m  Patient more awake and alert but still confused review of systems unreliable, patient denying abdominal pain but clearly has been having at throughout the evening based on her request for pain medication for this purpose    Objective:     Vitals:   Temp (24hrs), Av 1 °F (37 8 °C), Min:99 °F (37 2 °C), Max:101 1 °F (38 4 °C)    Temp:  [99 °F (37 2 °C)-101 1 °F (38 4 °C)] 99 7 °F (37 6 °C)  HR:  [67-82] 70  Resp:  [29-53] 42  BP: (104-166)/(63-98) 166/87  SpO2:  [89 %-96 %] 91 %  Body mass index is 51 95 kg/m²  Input and Output Summary (last 24 hours): Intake/Output Summary (Last 24 hours) at 19 0748  Last data filed at 19 0735   Gross per 24 hour   Intake           1667 5 ml   Output             7476 ml   Net          -5808 5 ml       Physical Exam:     Physical Exam   Constitutional: She appears well-developed and well-nourished  No distress  HENT:   Head: Normocephalic and atraumatic  Right Ear: External ear normal    Left Ear: External ear normal    Nose: Nose normal    Eyes: Conjunctivae are normal  Right eye exhibits no discharge  Left eye exhibits no discharge  No scleral icterus  Neck: No JVD present  No tracheal deviation present   No thyromegaly present  Cardiovascular: Normal rate, regular rhythm, normal heart sounds and intact distal pulses  Exam reveals no gallop and no friction rub  No murmur heard  Pulmonary/Chest: No stridor  No respiratory distress  She has wheezes (Mild end-expiratory wheezing)  She has no rales  Patient ascultated anteriorly   Abdominal: Soft  Bowel sounds are normal  She exhibits no distension  There is no tenderness  There is no rebound and no guarding  Reducible ventral hernia    Musculoskeletal: Normal range of motion  She exhibits no edema, tenderness or deformity  Neurological: She is alert  She exhibits normal muscle tone  Coordination normal    A plus O x1 oriented to self only not reorientable   Skin: Skin is warm and dry  No rash noted  She is not diaphoretic  No erythema  No pallor  Nursing note and vitals reviewed  Additional Data:     Labs:      Results from last 7 days  Lab Units 02/03/19  0518   WBC Thousand/uL 9 27   HEMOGLOBIN g/dL 12 9   HEMATOCRIT % 38 3   PLATELETS Thousands/uL 158   NEUTROS PCT % 73   LYMPHS PCT % 13*   MONOS PCT % 9   EOS PCT % 2       Results from last 7 days  Lab Units 02/03/19  0518   SODIUM mmol/L 144   POTASSIUM mmol/L 3 0*   CHLORIDE mmol/L 104   CO2 mmol/L 27   BUN mg/dL 13   CREATININE mg/dL 0 69   ANION GAP mmol/L 13   CALCIUM mg/dL 7 7*   ALBUMIN g/dL 2 3*   TOTAL BILIRUBIN mg/dL 1 80*   ALK PHOS U/L 121*   ALT U/L 23   AST U/L 21   GLUCOSE RANDOM mg/dL 96           Results from last 7 days  Lab Units 02/02/19  0419 02/01/19  1245 02/01/19  0536   POC GLUCOSE mg/dl 85 92 105                   * I Have Reviewed All Lab Data Listed Above  * Additional Pertinent Lab Tests Reviewed:  [address] Admission Reviewed    Imaging:    Imaging Reports Reviewed Today Include:  Chest x-ray  Imaging Personally Reviewed by Myself Includes:       Recent Cultures (last 7 days):           Last 24 Hours Medication List:     Current Facility-Administered Medications:  albuterol 2 5 mg Nebulization Once Kerry Greco MD    dexmedetomidine 0 1-1 5 mcg/kg/hr Intravenous Titrated Kerry Greco MD Last Rate: 0 5 mcg/kg/hr (02/03/19 0735)   furosemide 40 mg Intravenous Once Kerry Greco MD    haloperidol lactate 5 mg Intravenous Q6H PRN Kerry Greco MD    heparin (porcine) 5,000 Units Subcutaneous [facility] Kerry Greco MD    HYDROmorphone 1 mg Intravenous Q2H PRN Kerry Greco MD    LORazepam 1 mg Intravenous Q6H Michelle Solis DO    LORazepam 2 mg Intravenous Q4H PRN Arian Perez MD    ondansetron 4 mg Intravenous Q4H PRN Kerry Greco MD    potassium chloride 40 mEq Intravenous Once Kerry Greco MD    IVPB builder  Intravenous Q24H Kerry Greco MD Last Rate: 100 mL/hr at 02/02/19 1509        Today, Patient Was Seen By: Kerry Greco MD    ** Please Note: Dictation voice to text software may have been used in the creation of this document   **

## 2019-02-03 NOTE — PROGRESS NOTES
Pt cont to have occasional confusion, easily reoriented  Dr Digna Phillips aware  Orders to keep continuous staff observation for safety

## 2019-02-03 NOTE — ASSESSMENT & PLAN NOTE
Patient now in withdrawal on Precedex drip  Critical care following, appreciate their assistance  Continue IV thiamine and folate supplementation daily  Once tolerating p o  Will change this to p o   Route  Cessation counseled  Discussion with the patient reveals that this may be dual diagnosis an attempt to self treat with alcohol as results psychiatry consult has been placed for evaluation   [facility] protocol

## 2019-02-03 NOTE — PROGRESS NOTES
Progress Note - Pulmonary   Emilia Churchill 37 y o  female MRN: [id number]  Unit/Bed#: -02 Encounter: [id number]      Assessment/Plan:    · The patient is doing well off of Precedex  Ativan 2 mg is sufficient, but does not appear to be causing respiratory compromise  · Large volume diuresis  Still has crackles and wheezing on exam  CXR from 2/2 looks like fluid overload as well as small lung volumes  ABG remarkable for hypoxia  Continue diuresis per IM  Increase activity as tolerated  · Recovering from alcohol intoxication and pancreatitis  Still has slight RUQ discomfort  GI following    Subjective:   Feels better (off Precedex)  Thirsty  Objective:     Vitals: Blood pressure 145/81, pulse 80, temperature 99 7 °F (37 6 °C), temperature source Axillary, resp  rate (!) 50, height 5' 6" (1 676 m), weight (!) 146 kg (321 lb 14 oz), last menstrual period 01/16/2019, SpO2 (!) 89 %  , Body mass index is 51 95 kg/m²        Intake/Output Summary (Last 24 hours) at 02/03/19 1023  Last data filed at 02/03/19 0680   Gross per 24 hour   Intake           578 75 ml   Output             8018 ml   Net         -7439 25 ml       Physical Exam:      General:  Awake, alert, no distress    Heart:  Regular rate and rhythm, no murmur   Lungs: Bilateral scant crackles and wheezing   Extremities: No clubbing, cyanosis or edema    Labs:   CBC:   Lab Results   Component Value Date    WBC 9 27 02/03/2019    HGB 12 9 02/03/2019    HCT 38 3 02/03/2019     (H) 02/03/2019     02/03/2019    MCH 35 3 (H) 02/03/2019    MCHC 33 7 02/03/2019    RDW 11 6 02/03/2019    MPV 11 8 02/03/2019    NRBC 0 02/03/2019         Results from last 7 days  Lab Units 02/03/19  0518 02/02/19  0504 02/01/19  0454   WBC Thousand/uL 9 27 12 14* 14 76*   HEMOGLOBIN g/dL 12 9 13 0 13 2   HEMATOCRIT % 38 3 40 9 40 5   PLATELETS Thousands/uL 158 125* 123*       Results from last 7 days  Lab Units 02/01/19  0454   TRIGLYCERIDES mg/dL 132   HDL mg/dL 9*       Results from last 7 days  Lab Units 02/03/19  0846 02/03/19  0518 02/02/19  0504  02/01/19  0454   POTASSIUM mmol/L  --  3 0* 3 3*  --  3 0*   CHLORIDE mmol/L  --  104 104  --  101   CO2 mmol/L  --  27 25  --  26   CO2, I-STAT mmol/L 27  --   --   < >  --    BUN mg/dL  --  13 10  --  9   CREATININE mg/dL  --  0 69 0 65  --  0 76   CALCIUM mg/dL  --  7 7* 7 6*  --  7 3*   ALK PHOS U/L  --  121* 98  --  89   ALT U/L  --  23 27  --  39   AST U/L  --  21 22  --  23   < > = values in this interval not displayed          Results from last 7 days  Lab Units 02/03/19  0518 02/02/19  0504 02/01/19  0506   MAGNESIUM mg/dL 1 7 1 9 1 0*       Imaging and other studies: I have personally reviewed pertinent films in PACS     Yasmany Appiah MD

## 2019-02-03 NOTE — ASSESSMENT & PLAN NOTE
Prn pain control  D/c IV fluids  Supportive care including p r n   Antiemetics  Lipase has normalized   Patient altered from EtOH withdrawal, difficult to assess clinical manifestations of pancreatitis   Continue NPO  Total bilirubin still elevated, recheck in AM  GI following but MRCP now on hold due to patient's withdrawal symptoms

## 2019-02-03 NOTE — PROGRESS NOTES
Lyndsay Barrera  [id number]    37 y o   female      ASSESSMENT and PLAN    Alcohol withdrawal delirium, acute, hyperactive ([facility] [address] )   Assessment & Plan    More awake  Plan:  EtOH Withdraw protocol per ICU team     Cholelithiasis   Assessment & Plan    Probably asymptomatic and not related to current pancreatitis although w/ intermittent RUQ pain still could be playing role in current symptoms and pancreatitis    Plan:  Follow symptoms  If persistent right upper quadrant pain would recheck abdominal ultrasound  May re-entertain MRCP being done as an inpatient     * Acute pancreatitis   Assessment & Plan    Pancreatitis probably secondary to EtOH  Although she has gallstones, the severity of her current EtOH withdraw points toward significance of her EtOH intake    Plan:  Advance diet once mental status improves so she can safely take PO's  IVF stopped  Needs to stop drinking  Chief Complaint   Patient presents with    Abdominal Pain     Patient presents to the ER stating she started with RUQ abdominal pain yesterday, has history of gall stones       SUBJECTIVE/HPI   More awake  Asking appropriate questions about what is happening to her medically  Shortness of breath overnight secondary to fluid overload treated with IV Lasix    Intermittent right upper quadrant abdominal pain which she is using Dilaudid    /87 (BP Location: Left arm)   Pulse 70   Temp 99 7 °F (37 6 °C) (Axillary)   Resp (!) 42   Ht 5' 6" (1 676 m)   Wt (!) 146 kg (321 lb 14 oz)   LMP 01/16/2019   SpO2 91%   BMI 51 95 kg/m²     PHYSICALEXAM  General appearance: alert, appears stated age and cooperative  Head: Normocephalic, without obvious abnormality, atraumatic  Lungs: clear to auscultation bilaterally  Heart: regular rate and rhythm, S1, S2 normal, no murmur, click, rub or gallop  Abdomen: soft, minimal upper abd tender; bowel sounds normal; no masses,  no organomegaly  Extremities: extremities normal, atraumatic, no cyanosis or edema  Neurologic: Grossly normal    Lab Results   Component Value Date    CALCIUM 7 7 (L) 02/03/2019    K 3 0 (L) 02/03/2019    CO2 27 02/03/2019     02/03/2019    BUN 13 02/03/2019    CREATININE 0 69 02/03/2019     Lab Results   Component Value Date    WBC 9 27 02/03/2019    HGB 12 9 02/03/2019    HCT 38 3 02/03/2019     (H) 02/03/2019     02/03/2019     Lab Results   Component Value Date    ALT 23 02/03/2019    AST 21 02/03/2019    ALKPHOS 121 (H) 02/03/2019     Lab Results   Component Value Date    AMYLASE 33 09/27/2018     Lab Results   Component Value Date    LIPASE 213 02/01/2019     No results found for: IRON, TIBC, FERRITIN  No results found for: INR    Counseling / Coordination of Care  Total floor / unit time spent today 30 minutes

## 2019-02-04 ENCOUNTER — APPOINTMENT (INPATIENT)
Dept: ULTRASOUND IMAGING | Facility: HOSPITAL | Age: 44
DRG: 411 | End: 2019-02-04
Payer: COMMERCIAL

## 2019-02-04 LAB
ALBUMIN SERPL BCP-MCNC: 2.5 G/DL (ref 3.5–5)
ALP SERPL-CCNC: 190 U/L (ref 46–116)
ALT SERPL W P-5'-P-CCNC: 31 U/L (ref 12–78)
ANION GAP SERPL CALCULATED.3IONS-SCNC: 9 MMOL/L (ref 4–13)
ANISOCYTOSIS BLD QL SMEAR: PRESENT
AST SERPL W P-5'-P-CCNC: 52 U/L (ref 5–45)
ATRIAL RATE: 115 BPM
BASOPHILS # BLD MANUAL: 0 THOUSAND/UL (ref 0–0.1)
BASOPHILS NFR MAR MANUAL: 0 % (ref 0–1)
BILIRUB SERPL-MCNC: 2.6 MG/DL (ref 0.2–1)
BUN SERPL-MCNC: 12 MG/DL (ref 5–25)
CALCIUM SERPL-MCNC: 7.5 MG/DL (ref 8.3–10.1)
CHLORIDE SERPL-SCNC: 100 MMOL/L (ref 100–108)
CO2 SERPL-SCNC: 30 MMOL/L (ref 21–32)
CREAT SERPL-MCNC: 0.62 MG/DL (ref 0.6–1.3)
EOSINOPHIL # BLD MANUAL: 0.21 THOUSAND/UL (ref 0–0.4)
EOSINOPHIL NFR BLD MANUAL: 2 % (ref 0–6)
ERYTHROCYTE [DISTWIDTH] IN BLOOD BY AUTOMATED COUNT: 11.8 % (ref 11.6–15.1)
GFR SERPL CREATININE-BSD FRML MDRD: 111 ML/MIN/1.73SQ M
GLUCOSE SERPL-MCNC: 91 MG/DL (ref 65–140)
HCT VFR BLD AUTO: 43.4 % (ref 34.8–46.1)
HGB BLD-MCNC: 14.5 G/DL (ref 11.5–15.4)
LYMPHOCYTES # BLD AUTO: 1.76 THOUSAND/UL (ref 0.6–4.47)
LYMPHOCYTES # BLD AUTO: 17 % (ref 14–44)
MAGNESIUM SERPL-MCNC: 1.6 MG/DL (ref 1.6–2.6)
MCH RBC QN AUTO: 34.9 PG (ref 26.8–34.3)
MCHC RBC AUTO-ENTMCNC: 33.4 G/DL (ref 31.4–37.4)
MCV RBC AUTO: 104 FL (ref 82–98)
MONOCYTES # BLD AUTO: 0.62 THOUSAND/UL (ref 0–1.22)
MONOCYTES NFR BLD: 6 % (ref 4–12)
NEUTROPHILS # BLD MANUAL: 7.76 THOUSAND/UL (ref 1.85–7.62)
NEUTS BAND NFR BLD MANUAL: 1 % (ref 0–8)
NEUTS SEG NFR BLD AUTO: 74 % (ref 43–75)
NRBC BLD AUTO-RTO: 0 /100 WBCS
P AXIS: 49 DEGREES
PLATELET # BLD AUTO: 199 THOUSANDS/UL (ref 149–390)
PLATELET BLD QL SMEAR: ADEQUATE
PMV BLD AUTO: 12.4 FL (ref 8.9–12.7)
POLYCHROMASIA BLD QL SMEAR: PRESENT
POTASSIUM SERPL-SCNC: 2.8 MMOL/L (ref 3.5–5.3)
PR INTERVAL: 180 MS
PROT SERPL-MCNC: 7.2 G/DL (ref 6.4–8.2)
QRS AXIS: 0 DEGREES
QRSD INTERVAL: 106 MS
QT INTERVAL: 318 MS
QTC INTERVAL: 439 MS
RBC # BLD AUTO: 4.16 MILLION/UL (ref 3.81–5.12)
RBC MORPH BLD: PRESENT
SODIUM SERPL-SCNC: 139 MMOL/L (ref 136–145)
T WAVE AXIS: 23 DEGREES
TOTAL CELLS COUNTED SPEC: 100
VENTRICULAR RATE: 115 BPM
WBC # BLD AUTO: 10.34 THOUSAND/UL (ref 4.31–10.16)

## 2019-02-04 PROCEDURE — 99232 SBSQ HOSP IP/OBS MODERATE 35: CPT | Performed by: INTERNAL MEDICINE

## 2019-02-04 PROCEDURE — 94760 N-INVAS EAR/PLS OXIMETRY 1: CPT

## 2019-02-04 PROCEDURE — 83735 ASSAY OF MAGNESIUM: CPT | Performed by: INTERNAL MEDICINE

## 2019-02-04 PROCEDURE — 99233 SBSQ HOSP IP/OBS HIGH 50: CPT | Performed by: INTERNAL MEDICINE

## 2019-02-04 PROCEDURE — 99232 SBSQ HOSP IP/OBS MODERATE 35: CPT | Performed by: NURSE PRACTITIONER

## 2019-02-04 PROCEDURE — 80053 COMPREHEN METABOLIC PANEL: CPT | Performed by: INTERNAL MEDICINE

## 2019-02-04 PROCEDURE — 76700 US EXAM ABDOM COMPLETE: CPT

## 2019-02-04 PROCEDURE — 93010 ELECTROCARDIOGRAM REPORT: CPT | Performed by: INTERNAL MEDICINE

## 2019-02-04 PROCEDURE — 85007 BL SMEAR W/DIFF WBC COUNT: CPT | Performed by: INTERNAL MEDICINE

## 2019-02-04 PROCEDURE — 85027 COMPLETE CBC AUTOMATED: CPT | Performed by: INTERNAL MEDICINE

## 2019-02-04 RX ORDER — POTASSIUM CHLORIDE 14.9 MG/ML
20 INJECTION INTRAVENOUS
Status: DISCONTINUED | OUTPATIENT
Start: 2019-02-04 | End: 2019-02-04

## 2019-02-04 RX ORDER — HYDROMORPHONE HCL/PF 1 MG/ML
0.5 SYRINGE (ML) INJECTION EVERY 4 HOURS PRN
Status: DISCONTINUED | OUTPATIENT
Start: 2019-02-04 | End: 2019-02-08 | Stop reason: HOSPADM

## 2019-02-04 RX ORDER — MAGNESIUM SULFATE HEPTAHYDRATE 40 MG/ML
2 INJECTION, SOLUTION INTRAVENOUS ONCE
Status: COMPLETED | OUTPATIENT
Start: 2019-02-04 | End: 2019-02-04

## 2019-02-04 RX ORDER — POTASSIUM CHLORIDE 20 MEQ/1
40 TABLET, EXTENDED RELEASE ORAL ONCE
Status: COMPLETED | OUTPATIENT
Start: 2019-02-04 | End: 2019-02-04

## 2019-02-04 RX ORDER — POTASSIUM CHLORIDE 29.8 MG/ML
40 INJECTION INTRAVENOUS ONCE
Status: DISCONTINUED | OUTPATIENT
Start: 2019-02-04 | End: 2019-02-04

## 2019-02-04 RX ORDER — ALBUTEROL SULFATE 90 UG/1
2 AEROSOL, METERED RESPIRATORY (INHALATION) EVERY 4 HOURS PRN
Status: DISCONTINUED | OUTPATIENT
Start: 2019-02-04 | End: 2019-02-08 | Stop reason: HOSPADM

## 2019-02-04 RX ORDER — CHLORDIAZEPOXIDE HYDROCHLORIDE 25 MG/1
25 CAPSULE, GELATIN COATED ORAL EVERY 8 HOURS SCHEDULED
Status: DISCONTINUED | OUTPATIENT
Start: 2019-02-04 | End: 2019-02-08 | Stop reason: HOSPADM

## 2019-02-04 RX ORDER — LORAZEPAM 2 MG/ML
1 INJECTION INTRAMUSCULAR EVERY 6 HOURS PRN
Status: DISCONTINUED | OUTPATIENT
Start: 2019-02-04 | End: 2019-02-05

## 2019-02-04 RX ADMIN — HEPARIN SODIUM 5000 UNITS: 5000 INJECTION INTRAVENOUS; SUBCUTANEOUS at 22:08

## 2019-02-04 RX ADMIN — LORAZEPAM 1 MG: 2 INJECTION, SOLUTION INTRAMUSCULAR; INTRAVENOUS at 02:30

## 2019-02-04 RX ADMIN — CHLORDIAZEPOXIDE HYDROCHLORIDE 25 MG: 25 CAPSULE ORAL at 22:09

## 2019-02-04 RX ADMIN — LORAZEPAM 1 MG: 2 INJECTION, SOLUTION INTRAMUSCULAR; INTRAVENOUS at 07:45

## 2019-02-04 RX ADMIN — HEPARIN SODIUM 5000 UNITS: 5000 INJECTION INTRAVENOUS; SUBCUTANEOUS at 13:06

## 2019-02-04 RX ADMIN — POTASSIUM CHLORIDE 40 MEQ: 1500 TABLET, EXTENDED RELEASE ORAL at 10:59

## 2019-02-04 RX ADMIN — POTASSIUM CHLORIDE 40 MEQ: 1500 TABLET, EXTENDED RELEASE ORAL at 13:06

## 2019-02-04 RX ADMIN — FOLIC ACID: 5 INJECTION, SOLUTION INTRAMUSCULAR; INTRAVENOUS; SUBCUTANEOUS at 16:35

## 2019-02-04 RX ADMIN — HYDROMORPHONE HYDROCHLORIDE 0.5 MG: 1 INJECTION, SOLUTION INTRAMUSCULAR; INTRAVENOUS; SUBCUTANEOUS at 23:58

## 2019-02-04 RX ADMIN — METRONIDAZOLE 500 MG: 500 INJECTION, SOLUTION INTRAVENOUS at 04:58

## 2019-02-04 RX ADMIN — HEPARIN SODIUM 5000 UNITS: 5000 INJECTION INTRAVENOUS; SUBCUTANEOUS at 05:36

## 2019-02-04 RX ADMIN — CHLORDIAZEPOXIDE HYDROCHLORIDE 25 MG: 25 CAPSULE ORAL at 13:06

## 2019-02-04 RX ADMIN — MAGNESIUM SULFATE IN WATER 2 G: 40 INJECTION, SOLUTION INTRAVENOUS at 10:59

## 2019-02-04 NOTE — PROGRESS NOTES
Cele Frazier  [id number]    37 y o   female      ASSESSMENT and PLAN    Alcohol withdrawal delirium, acute, hyperactive ([address] )   Assessment & Plan    Awake and oriented with occasional periods of confusion    Plan:  Continue EtOH Withdraw protocol per ICU team     Cholelithiasis   Assessment & Plan    Probably asymptomatic and not related to current pancreatitis although w/ intermittent RUQ pain still could be playing role in current symptoms and pancreatitis - pain improved although lft's slightly elevated  Plan:  Follow symptoms  If LFT's continue to climb or pain worsens  persistent right upper quadrant pain would recheck abdominal ultrasound  May re-entertain MRCP being done as an inpatient  * Acute pancreatitis   Assessment & Plan    Pancreatitis probably secondary to EtOH  Although she has gallstones, the severity of her current EtOH withdraw points toward significance of her EtOH intake  Plan:  Advance diet to full liquids  IVF stopped  DIscussed with pt the that it is imperative that she discontinue alcohol  Chief Complaint   Patient presents with    Abdominal Pain     Patient presents to the ER stating she started with RUQ abdominal pain yesterday, has history of gall stones       SUBJECTIVE/HPI   Abdominal pain improved at least 50%  "Hungry"  Some lower RLQ abdominal pain  No other complaints      /72   Pulse 92   Temp 98 4 °F (36 9 °C) (Oral)   Resp 18   Ht 5' 6" (1 676 m)   Wt (!) 146 kg (321 lb 14 oz)   LMP 01/16/2019   SpO2 97%   BMI 51 95 kg/m²     PHYSICALEXAM  General appearance: alert, appears stated age and cooperative  Head: Normocephalic  Lungs:  Decreased  Heart:  Tachycardic  Abdomen: soft, obese, slightly tender right lower quadrant no rebound or guarding  Extremities: extremities normal  Neurologic: Grossly normal, no asterixis    Lab Results   Component Value Date    CALCIUM 7 5 (L) 02/04/2019    K 2 8 (L) 02/04/2019    CO2 30 02/04/2019     02/04/2019    BUN 12 02/04/2019    CREATININE 0 62 02/04/2019     Lab Results   Component Value Date    WBC 10 34 (H) 02/04/2019    HGB 14 5 02/04/2019    HCT 43 4 02/04/2019     (H) 02/04/2019     02/04/2019     Lab Results   Component Value Date    ALT 31 02/04/2019    AST 52 (H) 02/04/2019    ALKPHOS 190 (H) 02/04/2019     Lab Results   Component Value Date    AMYLASE 33 09/27/2018     Lab Results   Component Value Date    LIPASE 213 02/01/2019     No results found for: IRON, TIBC, FERRITIN  No results found for: INR    Counseling / Coordination of Care  Total floor / unit time spent today 45 minutes

## 2019-02-04 NOTE — PROGRESS NOTES
Progress Note - Critical Care  Cele Frazier 37 y o  female MRN: [id number]  Unit/Bed#: -02 Encounter: [id number]      Assessment/Plan:    · Acute hypoxic respiratory failure secondary to atelectasis - improving  Encourage incentive spirometry  Titrate O2 for saturations 90%  · Acute alcohol withdrawal syndrome - improving  Now off Precedex  Continue CIWA protocol  Avoid over sedation  · Acute pancreatitis - improving  Tolerating clear liquid diet  · Mild intermittent asthma without acute exacerbation - patient states that she is on Symbicort as an outpatient  We have contacted her primary care physician's office and pharmacy and Symbicort has not been prescribed further records  We will make albuterol available as needed  Subjective:   Patient denies shortness of breath, cough or wheeze  Abdominal pain has improved  Objective:     Vitals: Blood pressure 140/72, pulse 92, temperature 98 4 °F (36 9 °C), temperature source Oral, resp  rate 18, height 5' 6" (1 676 m), weight (!) 146 kg (321 lb 14 oz), last menstrual period 01/16/2019, SpO2 97 %  , 4 L, Body mass index is 51 95 kg/m²  Intake/Output Summary (Last 24 hours) at 02/04/19 0948  Last data filed at 02/03/19 1801   Gross per 24 hour   Intake              660 ml   Output             1700 ml   Net            -1040 ml       Physical Exam:      General:  Awake, alert, no distress   HEENT: PERRL, EOMI, moist mucosa    Heart:  Regular rate and rhythm, no murmur   Lungs: Decreased at the bases but clear   Abdomen: Soft, nontender, normal bowel sounds   Extremities: No clubbing, cyanosis or edema    Labs: I have personally reviewed pertinent lab results        Results from last 7 days  Lab Units 02/04/19  0438 02/03/19  0518 02/02/19  0504   WBC Thousand/uL 10 34* 9 27 12 14*   HEMOGLOBIN g/dL 14 5 12 9 13 0   HEMATOCRIT % 43 4 38 3 40 9   PLATELETS Thousands/uL 199 158 125*       Results from last 7 days  Lab Units 02/01/19  0454 TRIGLYCERIDES mg/dL 132   HDL mg/dL 9*       Results from last 7 days  Lab Units 02/04/19 0438 02/03/19  0846 02/03/19  0518 02/02/19  0504   POTASSIUM mmol/L 2 8*  --  3 0* 3 3*   CHLORIDE mmol/L 100  --  104 104   CO2 mmol/L 30  --  27 25   CO2, I-STAT mmol/L  --  27  --   --    BUN mg/dL 12  --  13 10   CREATININE mg/dL 0 62  --  0 69 0 65   CALCIUM mg/dL 7 5*  --  7 7* 7 6*   ALK PHOS U/L 190*  --  121* 98   ALT U/L 31  --  23 27   AST U/L 52*  --  21 22           Results from last 7 days  Lab Units 02/04/19 0438 02/03/19 0518 02/02/19  0504   MAGNESIUM mg/dL 1 6 1 7 1 9

## 2019-02-04 NOTE — ASSESSMENT & PLAN NOTE
Improving  Advance diet as tolerated  GI input noted  Analgesics for pain relief  Alcohol cessation discussed

## 2019-02-04 NOTE — PROGRESS NOTES
Progress Note - Allison Leonardo 1975, 37 y o  female MRN: [id number]    Unit/Bed#: -02 Encounter: [id number]    Primary Care Provider: Delio Goins MD   Date and time admitted to hospital: 1/30/2019  8:29 AM        Acute respiratory failure with hypoxemia [facility])   Assessment & Plan    Improving  Wean supplemental oxygen as tolerated to keep O2 sats more than 90%  Incentive spirometry encouraged     Hypokalemia   Assessment & Plan    Replete aggressively  Patient reluctant to receive IV potassium chloride  Monitor closely       UTI (urinary tract infection)   Assessment & Plan    Monitor       Alcohol abuse   Assessment & Plan    Now off Precedex  On CIWA protocol  Will have her on Librium  Ativan p r n  Morbid obesity ([address] )   Assessment & Plan    Therapeutic lifestyle modification discussed     Cholelithiasis   Assessment & Plan    Monitor LFTs  GI following     * Acute pancreatitis   Assessment & Plan    Improving  Advance diet as tolerated  GI input noted  Analgesics for pain relief  Alcohol cessation discussed           VTE Pharmacologic Prophylaxis:   Pharmacologic: Heparin  Mechanical VTE Prophylaxis in Place: Yes    Patient Centered Rounds: I have performed bedside rounds with nursing staff today  Discussions with Specialists or Other Care Team Provider:     Education and Discussions with Family / Patient:  Discussed with the patient, called and left a message for spouse over phone    Time Spent for Care: 45 minutes  More than 50% of total time spent on counseling and coordination of care as described above      Current Length of Stay: 5 day(s)    Current Patient Status: Inpatient   Certification Statement: The patient will continue to require additional inpatient hospital stay due to as mentioned    Discharge Plan: awaiting clinical symptomatic improvement    Code Status: Level 1 - Full Code      Subjective:     Reports improving abdominal pain  Agreeable to advancing diet  Lethargic  Encouraged out of bed into a chair  Encourage incentive spirometry    Objective:     Vitals:   Temp (24hrs), Av 4 °F (37 4 °C), Min:97 8 °F (36 6 °C), Max:100 9 °F (38 3 °C)    Temp:  [97 8 °F (36 6 °C)-100 9 °F (38 3 °C)] 97 8 °F (36 6 °C)  HR:  [] 96  Resp:  [18-32] 18  BP: (109-145)/(56-91) 145/91  SpO2:  [94 %-98 %] 95 %  Body mass index is 51 95 kg/m²  Input and Output Summary (last 24 hours): Intake/Output Summary (Last 24 hours) at 19 1511  Last data filed at 19 1801   Gross per 24 hour   Intake              200 ml   Output                0 ml   Net              200 ml       Physical Exam:     Physical Exam    Morbidly obese  Short thick neck  Lungs diminished breath sounds bilaterally  Heart sounds S1-S2 noted  Abdomen soft tenderness mild diffuse  Drowsy but arousable, obeys simple commands  Pulses noted  No pedal edema  No rash    Additional Data:     Labs:      Results from last 7 days  Lab Units 19  0438 19  0518   WBC Thousand/uL 10 34* 9 27   HEMOGLOBIN g/dL 14 5 12 9   HEMATOCRIT % 43 4 38 3   PLATELETS Thousands/uL 199 158   BANDS PCT % 1  --    NEUTROS PCT %  --  73   LYMPHS PCT %  --  13*   LYMPHO PCT % 17  --    MONOS PCT %  --  9   MONO PCT % 6  --    EOS PCT % 2 2       Results from last 7 days  Lab Units 19  0438   SODIUM mmol/L 139   POTASSIUM mmol/L 2 8*   CHLORIDE mmol/L 100   CO2 mmol/L 30   BUN mg/dL 12   CREATININE mg/dL 0 62   ANION GAP mmol/L 9   CALCIUM mg/dL 7 5*   ALBUMIN g/dL 2 5*   TOTAL BILIRUBIN mg/dL 2 60*   ALK PHOS U/L 190*   ALT U/L 31   AST U/L 52*   GLUCOSE RANDOM mg/dL 91           Results from last 7 days  Lab Units 19  0419 19  1245 19  0536   POC GLUCOSE mg/dl 85 92 105                   * I Have Reviewed All Lab Data Listed Above  * Additional Pertinent Lab Tests Reviewed:  All Labs Within Last 24 Hours Reviewed    Imaging:    Imaging Reports Reviewed Today Include:  Imaging studies reviewed  Imaging Personally Reviewed by Myself Includes:     Recent Cultures (last 7 days):           Last 24 Hours Medication List:     Current Facility-Administered Medications:  albuterol 2 puff Inhalation Q4H PRN Kobe Wood DO    chlordiazePOXIDE 25 mg Oral Q8H Nathalie Lovelace MD    haloperidol lactate 5 mg Intravenous Q6H PRN Mima Headley MD    heparin (porcine) 5,000 Units Subcutaneous [facility]    HYDROmorphone 0 5 mg Intravenous Q4H PRN Collins Joseph MD    LORazepam 1 mg Intravenous Q6H PRN Collins Joseph MD    ondansetron 4 mg Intravenous Q4H PRN Mima Headley MD    sodium chloride 1 spray Each Nare Q1H PRN Mima Headley MD    IVPB builder  Intravenous Q24H Mima Headley MD Last Rate: 100 mL/hr at 02/03/19 1538        Today, Patient Was Seen By: Collins Joseph MD    ** Please Note: Dictation voice to text software may have been used in the creation of this document   **

## 2019-02-04 NOTE — ASSESSMENT & PLAN NOTE
Improving  Wean supplemental oxygen as tolerated to keep O2 sats more than 90%  Incentive spirometry encouraged

## 2019-02-05 ENCOUNTER — APPOINTMENT (INPATIENT)
Dept: NUCLEAR MEDICINE | Facility: HOSPITAL | Age: 44
DRG: 411 | End: 2019-02-05
Payer: COMMERCIAL

## 2019-02-05 LAB
ALBUMIN SERPL BCP-MCNC: 2 G/DL (ref 3.5–5)
ALP SERPL-CCNC: 149 U/L (ref 46–116)
ALT SERPL W P-5'-P-CCNC: 27 U/L (ref 12–78)
AMYLASE SERPL-CCNC: 69 IU/L (ref 25–115)
ANION GAP SERPL CALCULATED.3IONS-SCNC: 8 MMOL/L (ref 4–13)
AST SERPL W P-5'-P-CCNC: 37 U/L (ref 5–45)
BILIRUB SERPL-MCNC: 1.4 MG/DL (ref 0.2–1)
BUN SERPL-MCNC: 6 MG/DL (ref 5–25)
CALCIUM SERPL-MCNC: 7.3 MG/DL (ref 8.3–10.1)
CHLORIDE SERPL-SCNC: 102 MMOL/L (ref 100–108)
CO2 SERPL-SCNC: 31 MMOL/L (ref 21–32)
CREAT SERPL-MCNC: 0.52 MG/DL (ref 0.6–1.3)
EST. AVERAGE GLUCOSE BLD GHB EST-MCNC: 97 MG/DL
GFR SERPL CREATININE-BSD FRML MDRD: 117 ML/MIN/1.73SQ M
GLUCOSE SERPL-MCNC: 95 MG/DL (ref 65–140)
HBA1C MFR BLD: 5 % (ref 4.2–6.3)
LIPASE SERPL-CCNC: 753 U/L (ref 73–393)
MAGNESIUM SERPL-MCNC: 1.7 MG/DL (ref 1.6–2.6)
POTASSIUM SERPL-SCNC: 2.9 MMOL/L (ref 3.5–5.3)
PROT SERPL-MCNC: 5.9 G/DL (ref 6.4–8.2)
SODIUM SERPL-SCNC: 141 MMOL/L (ref 136–145)

## 2019-02-05 PROCEDURE — 78227 HEPATOBIL SYST IMAGE W/DRUG: CPT

## 2019-02-05 PROCEDURE — 99231 SBSQ HOSP IP/OBS SF/LOW 25: CPT | Performed by: INTERNAL MEDICINE

## 2019-02-05 PROCEDURE — 80053 COMPREHEN METABOLIC PANEL: CPT | Performed by: INTERNAL MEDICINE

## 2019-02-05 PROCEDURE — 83690 ASSAY OF LIPASE: CPT | Performed by: NURSE PRACTITIONER

## 2019-02-05 PROCEDURE — 94760 N-INVAS EAR/PLS OXIMETRY 1: CPT

## 2019-02-05 PROCEDURE — 83735 ASSAY OF MAGNESIUM: CPT | Performed by: INTERNAL MEDICINE

## 2019-02-05 PROCEDURE — 83036 HEMOGLOBIN GLYCOSYLATED A1C: CPT | Performed by: INTERNAL MEDICINE

## 2019-02-05 PROCEDURE — A9537 TC99M MEBROFENIN: HCPCS

## 2019-02-05 PROCEDURE — 99232 SBSQ HOSP IP/OBS MODERATE 35: CPT | Performed by: INTERNAL MEDICINE

## 2019-02-05 PROCEDURE — 82150 ASSAY OF AMYLASE: CPT | Performed by: NURSE PRACTITIONER

## 2019-02-05 PROCEDURE — 99254 IP/OBS CNSLTJ NEW/EST MOD 60: CPT | Performed by: SURGERY

## 2019-02-05 RX ORDER — MAGNESIUM SULFATE 1 G/100ML
1 INJECTION INTRAVENOUS ONCE
Status: COMPLETED | OUTPATIENT
Start: 2019-02-05 | End: 2019-02-05

## 2019-02-05 RX ORDER — SODIUM CHLORIDE 9 MG/ML
125 INJECTION, SOLUTION INTRAVENOUS CONTINUOUS
Status: DISCONTINUED | OUTPATIENT
Start: 2019-02-05 | End: 2019-02-07

## 2019-02-05 RX ORDER — POTASSIUM CHLORIDE 20 MEQ/1
40 TABLET, EXTENDED RELEASE ORAL EVERY 4 HOURS
Status: COMPLETED | OUTPATIENT
Start: 2019-02-05 | End: 2019-02-05

## 2019-02-05 RX ADMIN — CHLORDIAZEPOXIDE HYDROCHLORIDE 25 MG: 25 CAPSULE ORAL at 21:00

## 2019-02-05 RX ADMIN — SODIUM CHLORIDE 125 ML/HR: 0.9 INJECTION, SOLUTION INTRAVENOUS at 10:31

## 2019-02-05 RX ADMIN — FOLIC ACID: 5 INJECTION, SOLUTION INTRAMUSCULAR; INTRAVENOUS; SUBCUTANEOUS at 15:22

## 2019-02-05 RX ADMIN — HEPARIN SODIUM 5000 UNITS: 5000 INJECTION INTRAVENOUS; SUBCUTANEOUS at 06:27

## 2019-02-05 RX ADMIN — SODIUM CHLORIDE 125 ML/HR: 0.9 INJECTION, SOLUTION INTRAVENOUS at 23:43

## 2019-02-05 RX ADMIN — POTASSIUM CHLORIDE 40 MEQ: 1500 TABLET, EXTENDED RELEASE ORAL at 15:20

## 2019-02-05 RX ADMIN — MAGNESIUM SULFATE HEPTAHYDRATE 1 G: 1 INJECTION, SOLUTION INTRAVENOUS at 10:31

## 2019-02-05 RX ADMIN — HYDROMORPHONE HYDROCHLORIDE 0.5 MG: 1 INJECTION, SOLUTION INTRAMUSCULAR; INTRAVENOUS; SUBCUTANEOUS at 21:09

## 2019-02-05 RX ADMIN — CHLORDIAZEPOXIDE HYDROCHLORIDE 25 MG: 25 CAPSULE ORAL at 15:20

## 2019-02-05 RX ADMIN — HEPARIN SODIUM 5000 UNITS: 5000 INJECTION INTRAVENOUS; SUBCUTANEOUS at 21:00

## 2019-02-05 RX ADMIN — CHLORDIAZEPOXIDE HYDROCHLORIDE 25 MG: 25 CAPSULE ORAL at 06:27

## 2019-02-05 RX ADMIN — POTASSIUM CHLORIDE 40 MEQ: 1500 TABLET, EXTENDED RELEASE ORAL at 10:31

## 2019-02-05 RX ADMIN — SINCALIDE 2.9 MCG: 5 INJECTION, POWDER, LYOPHILIZED, FOR SOLUTION INTRAVENOUS at 10:55

## 2019-02-05 RX ADMIN — HEPARIN SODIUM 5000 UNITS: 5000 INJECTION INTRAVENOUS; SUBCUTANEOUS at 15:21

## 2019-02-05 NOTE — PROGRESS NOTES
Zeny Ibanez  [id number]    37 y o   female      ASSESSMENT and PLAN    Alcohol withdrawal delirium, acute, hyperactive ([address] )   Assessment & Plan    Awake and oriented x3  CIWA down    Plan:  EtOH Withdraw protocol per ICU team     Cholelithiasis   Assessment & Plan    Unclear if pancreatitis related to gallstones    Plan:  LFTs increased but now her decreasing  Gallstones are noted on ultrasound  Surgery consult  Will check a HIDA scan today  If an MRCP needed she will need to be transferred to an outside hospital due to large body habitus  * Acute pancreatitis   Assessment & Plan    Pancreatitis probably secondary to EtOH  Although she has gallstones, the severity of her current EtOH withdraw points toward significance of her EtOH intake    Plan:  IV fluids, full liquid diet  Chief Complaint   Patient presents with    Abdominal Pain     Patient presents to the ER stating she started with RUQ abdominal pain yesterday, has history of gall stones       SUBJECTIVE/HPI continues with right upper quadrant abdominal pain although has less than  Tolerating clears      /85   Pulse 83   Temp 99 °F (37 2 °C)   Resp 20   Ht 5' 6" (1 676 m)   Wt (!) 145 kg (319 lb 0 1 oz)   LMP 01/16/2019   SpO2 95%   BMI 51 49 kg/m²     PHYSICALEXAM  General appearance: alert, appears stated age and cooperative  Head: Normocephalic  Lungs: clear to auscultation bilaterally  Heart: regular rate and rhythm  Abdomen: soft, tender in right upper quadrant, no rebound or guarding, obese  Extremities: extremities normal, atraumatic, no cyanosis or edema  Neurologic: Grossly normal    Lab Results   Component Value Date    CALCIUM 7 3 (L) 02/05/2019    K 2 9 (L) 02/05/2019    CO2 31 02/05/2019     02/05/2019    BUN 6 02/05/2019    CREATININE 0 52 (L) 02/05/2019     Lab Results   Component Value Date    WBC 10 34 (H) 02/04/2019    HGB 14 5 02/04/2019    HCT 43 4 02/04/2019     (H) 02/04/2019     02/04/2019     Lab Results   Component Value Date    ALT 27 02/05/2019    AST 37 02/05/2019    ALKPHOS 149 (H) 02/05/2019     Lab Results   Component Value Date    AMYLASE 69 02/05/2019     Lab Results   Component Value Date    LIPASE 753 (H) 02/05/2019     No results found for: IRON, TIBC, FERRITIN  No results found for: INR    Counseling / Coordination of Care  Total floor / unit time spent today 30 minutes

## 2019-02-05 NOTE — PROGRESS NOTES
Progress Note - Pulmonary   Lyndsay Hinesr 37 y o  female MRN: [id number]  Unit/Bed#: -02 Encounter: [id number]      Assessment/Plan:    · Acute hypoxic respiratory failure due to atelectasis - resolved    · Acute alcohol withdrawal syndrome - improved    · Mild intermittent asthma without acute exacerbation - albuterol as needed  Outpatient pulmonary follow-up  I will sign off  Please call if needed  Subjective:   Patient feels better  Complains of nasal congestion  Denies shortness of breath or cough  Objective:     Vitals: Blood pressure 128/85, pulse 83, temperature 99 °F (37 2 °C), resp  rate 20, height 5' 6" (1 676 m), weight (!) 145 kg (319 lb 0 1 oz), last menstrual period 01/16/2019, SpO2 95 % , room air, Body mass index is 51 49 kg/m²  No intake or output data in the 24 hours ending 02/05/19 0947    Physical Exam:      General:  Awake, alert, no distress   HEENT: PERRL, EOMI, moist mucosa    Heart:  Regular rate and rhythm, no murmur   Lungs: Clear   Abdomen: Soft, nontender, normal bowel sounds   Extremities: No clubbing, cyanosis or edema    Labs: I have personally reviewed pertinent lab results        Results from last 7 days  Lab Units 02/04/19  0438 02/03/19  0518 02/02/19  0504   WBC Thousand/uL 10 34* 9 27 12 14*   HEMOGLOBIN g/dL 14 5 12 9 13 0   HEMATOCRIT % 43 4 38 3 40 9   PLATELETS Thousands/uL 199 158 125*       Results from last 7 days  Lab Units 02/01/19  0454   TRIGLYCERIDES mg/dL 132   HDL mg/dL 9*       Results from last 7 days  Lab Units 02/05/19  0511 02/04/19  0438 02/03/19  0846 02/03/19  0518   POTASSIUM mmol/L 2 9* 2 8*  --  3 0*   CHLORIDE mmol/L 102 100  --  104   CO2 mmol/L 31 30  --  27   CO2, I-STAT mmol/L  --   --  27  --    BUN mg/dL 6 12  --  13   CREATININE mg/dL 0 52* 0 62  --  0 69   CALCIUM mg/dL 7 3* 7 5*  --  7 7*   ALK PHOS U/L 149* 190*  --  121*   ALT U/L 27 31  --  23   AST U/L 37 52*  --  21           Results from last 7 days  Lab Units 02/05/19  0511 02/04/19  0438 02/03/19  0518   MAGNESIUM mg/dL 1 7 1 6 1 7

## 2019-02-05 NOTE — CONSULTS
Consultation - General Surgery   Benjaman Kanner 37 y o  female MRN: [id number]  Unit/Bed#: -02 Encounter: [id number]    Assessment/Plan     Acute recurrent pancreatitis in patient with history of alcohol abuse and known cholelithiasis with elevated bilirubin and transaminases and one week of RUQ pain  -previous admission in September of 2018 with similar symptoms  -lipase, bilirubin and transaminases trending downward  -possibly related to passed common bile duct stone and/or combination of ETOH  -patient continues to complain of RUQ abdominal pain  No fevers or chills or significant leukocytosis  -HIDA scan pending, if gallbladder not visualized consider laparoscopic cholecystectomy  Will discuss results with Dr John Duval  -consider MRCP if transaminitis and elevated bilirubin continue, not done on this admission  Patient would need to be transferred due to her size  She does not fit in the MRI machine   -continue to trend lab values  -diet based on HIDA results    ETOH abuse with withdrawal symptoms  -on CIWA protocol  -continue to monitor and treat withdrawal symptoms    Hypokalemia-replace and monitor    History of Adilson-en-Y gastric bypass in 2002  -initially with significant weight loss but admits to gaining most of her weight back  -would make ERCP more difficult if indicated in this patient    History of Present Illness     HPI:  Benjaman Kanner is a 37 y o  female admitted with recurrent pancreatitis  Patient has a pertinent past medical history for adilson-en-y bypass surgery (approximately 20 years ago), alcoholism, and admissions for similar symptoms in the past  Patient now with RUQ pain x 6 days  Patient states that the pain has increased from a dull ache to a sharp pain located in her upper abdomen  Denies consumption of  high fat diet prior to this  Current laboratory values show increased lipase, total bilirubin and transaminases that are currently decreasing   Mild wall thickening on ultrasound with gallstones present within the gallbladder but no distension or pericholecystic fluid  She has not undergone an MRCP on this admission  She is currently scheduled for a HIDA scan  She denies any associated symptoms of nausea, vomiting, fevers, chills, change in bowel habits or discolored stools  Does admit to some chronic diarrhea but this is unchanged  She has been able to tolerate clear liquids  Inpatient consult to Acute Care Surgery  Consult performed by: TIFFANY WOOD  Consult ordered by: Steven Martin          Review of Systems   Constitutional: Positive for appetite change  Negative for fatigue, fever and unexpected weight change  HENT: Negative  Respiratory: Positive for chest tightness and shortness of breath  Negative for apnea, cough, choking, wheezing and stridor  Cardiovascular: Negative  Negative for chest pain, palpitations and leg swelling  Gastrointestinal: Positive for abdominal pain (RUQ) and diarrhea  Negative for abdominal distention, constipation, nausea and vomiting  Endocrine: Negative  Genitourinary: Negative  Negative for difficulty urinating, dysuria and hematuria  Musculoskeletal: Positive for arthralgias  Skin: Negative  Negative for rash and wound  Neurological: Negative  Hematological: Negative  Psychiatric/Behavioral: Positive for agitation         Historical Information   Past Medical History:   Diagnosis Date    Asthma     Pancreatitis      Past Surgical History:   Procedure Laterality Date    GASTRIC BYPASS       Social History   History   Alcohol Use    3 0 oz/week    5 Shots of liquor per week     History   Drug Use No     History   Smoking Status    Never Smoker   Smokeless Tobacco    Never Used     Family History: non-contributory    Meds/Allergies   all current active meds have been reviewed  Allergies   Allergen Reactions    Augmentin [Amoxicillin-Pot Clavulanate]     Ciprofloxacin     Doxycycline     Latex     Penicillins     Tomato      Tongue irritation       Objective   First Vitals:   Blood Pressure: (!) 143/102 (01/30/19 0845)  Pulse: 72 (01/30/19 0930)  Temperature: 97 6 °F (36 4 °C) (01/30/19 1027)  Temp Source: Tympanic (01/30/19 1027)  Respirations: 20 (01/30/19 0930)  Height: 5' 5" (165 1 cm) (01/30/19 5894)  Weight - Scale: (!) 145 kg (320 lb) (01/30/19 0832)  SpO2: 92 % (01/30/19 0930)    Current Vitals:   Blood Pressure: 128/85 (02/05/19 0704)  Pulse: 83 (02/05/19 0704)  Temperature: 99 °F (37 2 °C) (02/05/19 0704)  Temp Source: Oral (02/04/19 2218)  Respirations: 20 (02/05/19 0704)  Height: 5' 6" (167 6 cm) (01/30/19 1135)  Weight - Scale: (!) 145 kg (319 lb 0 1 oz) (02/05/19 0704)  SpO2: 95 % (02/05/19 0850)    No intake or output data in the 24 hours ending 02/05/19 1429    Invasive Devices     Peripheral Intravenous Line            Peripheral IV 02/04/19 Right;Upper Arm less than 1 day                Physical Exam   Constitutional: She is oriented to person, place, and time  She is cooperative  Non-toxic appearance  No distress  Morbidly obese   HENT:   Head: Normocephalic and atraumatic  Mouth/Throat: Oropharynx is clear and moist  No oropharyngeal exudate  Eyes: EOM are normal  Right eye exhibits no discharge  Left eye exhibits no discharge  No scleral icterus  Neck: Neck supple  No JVD present  No tracheal deviation present  Cardiovascular: Normal rate, regular rhythm and normal heart sounds  No murmur heard  Pulmonary/Chest: No respiratory distress  She has no wheezes  She has no rales  Pain with respiration per patient   Abdominal: She exhibits no distension  Morbidly obese, tenderness over right upper quadrant without rebound or guarding, negative Cee's sign, few bowel sounds   Musculoskeletal: Normal range of motion  She exhibits no edema or deformity  Neurological: She is alert and oriented to person, place, and time     No focal deficits   Skin: Skin is warm and dry  No rash noted  She is not diaphoretic  No pallor  Psychiatric: Her behavior is normal    Tearful, anxious   Nursing note and vitals reviewed  Lab Results:   I have personally reviewed pertinent lab results  , CBC: No results found for: WBC, HGB, HCT, MCV, PLT, ADJUSTEDWBC, MCH, MCHC, RDW, MPV, NRBC, CMP:   Lab Results   Component Value Date    SODIUM 141 02/05/2019    K 2 9 (L) 02/05/2019     02/05/2019    CO2 31 02/05/2019    BUN 6 02/05/2019    CREATININE 0 52 (L) 02/05/2019    CALCIUM 7 3 (L) 02/05/2019    AST 37 02/05/2019    ALT 27 02/05/2019    ALKPHOS 149 (H) 02/05/2019    EGFR 117 02/05/2019   , Coagulation: No results found for: PT, INR, APTT, Urinalysis: No results found for: COLORU, CLARITYU, SPECGRAV, PHUR, LEUKOCYTESUR, NITRITE, PROTEINUA, GLUCOSEU, KETONESU, BILIRUBINUR, BLOODU, Amylase:   Lab Results   Component Value Date    AMYLASE 69 02/05/2019   , Lipase:   Lab Results   Component Value Date    LIPASE 753 (H) 02/05/2019     Imaging: I have personally reviewed pertinent reports  GB U/S:  IMPRESSION:  Multiple small gallstones in the gallbladder, and mild gallbladder thickening at 3 mm  There is no gallbladder distention or pericholecystic fluid      Sonographic Cee's sign cannot be accurately assessed because patient had recent pain medication administration, thus limiting ultrasound assessment of acute cholecystitis  EKG, Pathology, and Other Studies: I have personally reviewed pertinent reports        Ewa Foley PA-C

## 2019-02-05 NOTE — PROGRESS NOTES
[facility] Internal Medicine Progress Note  Patient: Oscar Winter 37 y o  female   MRN: [id number]  PCP: Cece Kamara MD  Unit/Bed#: -02 Encounter: [id number]  Date Of Visit: 02/05/19    Problem List:    Principal Problem:    Acute pancreatitis  Active Problems:    Cholelithiasis    Morbid obesity ([facility] [address] )    Alcohol abuse    UTI (urinary tract infection)    Hypokalemia    Alcohol withdrawal delirium, acute, hyperactive ([facility] [address] )    Acute respiratory failure with hypoxemia ([facility] [address] )      Assessment & Plan:      1  Acute pancreatitis: likely 2/2 EtOH abuse; however, CT abd/pelvis also revealed cholelithiasis w/o evidence of cholecystitis    TG WNL    Lipase trending up    Resume IVF NS at 125 mL/hour    For NM hepatobiliary - HIDA scan today     NPO again    Dilaudid PRN pain    F/u GI recs   Surgery consult   2  EtOH abuse w/withdrawal and delirium: Initially required Precedex gtt    c/w standing Librium as per CIWA protocol  - taper    C/w thiamine and folic acid    F/u psychiatry outpatient   3  Acute hypoxic respiratory failure 2/2 atelectasis: resolved    4  Morbid obesity: likely 2/2 excess calorie intake    Encourage lifestyle modification including diet and exercise once medically stable   5  Hypokalemia: persistent, replete aggressively w/KCl   6  Elevated T bili and alk phos: trending down, management as above       VTE Pharmacologic Prophylaxis:   Pharmacologic: Heparin  Mechanical VTE Prophylaxis in Place: Yes    Patient Centered Rounds: I have performed bedside rounds with nursing staff today  Discussions with Specialists or Other Care Team Provider: Yes    Education and Discussions with Family / Patient:Yes    Time Spent for Care: 30 minutes  More than 50% of total time spent on counseling and coordination of care as described above  Current Length of Stay: 6 day(s)    Current Patient Status: Inpatient     Discharge Plan: xxxxx     Code Status: Level 1 - Full Code    Certification Statement:  The patient will continue to require additional inpatient hospital stay due to management of acute pancreatitis       Subjective:   Reports 5/10 RUQ and occasional epigastric abdominal pain - sometimes sharp, sometimes dull  No nausea/vomiting       Objective:         Negative for Chest Pain, Palpitations, Shortness of Breath, Nausea, Vomiting, Constipation, Diarrhea, Dizziness  All other 10 review of systems negative and without drastic interval changes from yesterday  Vitals:   Temp (24hrs), Av 6 °F (37 °C), Min:97 8 °F (36 6 °C), Max:99 6 °F (37 6 °C)    Temp:  [97 8 °F (36 6 °C)-99 6 °F (37 6 °C)] 99 °F (37 2 °C)  HR:  [] 83  Resp:  [18-20] 20  BP: (128-148)/(72-91) 128/85  SpO2:  [92 %-96 %] 95 %  Body mass index is 51 49 kg/m²  Input and Output Summary (last 24 hours):     No intake or output data in the 24 hours ending 19 1005    Physical Exam:     Physical Exam   Constitutional: No distress  Morbidly obese   HENT:   Head: Normocephalic and atraumatic  Nose: Nose normal    Eyes: Pupils are equal, round, and reactive to light  Conjunctivae are normal    Neck: Normal range of motion  Neck supple  Cardiovascular: Normal rate, regular rhythm and normal heart sounds  Pulmonary/Chest: Effort normal and breath sounds normal  No respiratory distress  She has no wheezes  She has no rales  Abdominal: Soft  Bowel sounds are normal  She exhibits no distension  There is tenderness  There is no rebound and no guarding  RUQ tenderness - no rebound, no guarding, no rigidity    Musculoskeletal: She exhibits no edema  Neurological: She is alert  No cranial nerve deficit  Skin: Skin is warm and dry  No rash noted  Purpura improving    Psychiatric:   Flat affect   Vitals reviewed        Additional Data:     Labs:      Results from last 7 days  Lab Units 19  0438 19  0518   WBC Thousand/uL 10 34* 9 27   HEMOGLOBIN g/dL 14 5 12 9   HEMATOCRIT % 43 4 38 3   PLATELETS Thousands/uL 199 158   NEUTROS PCT %  --  73   LYMPHS PCT %  --  13*   LYMPHO PCT % 17  --    MONOS PCT %  --  9   MONO PCT % 6  --    EOS PCT % 2 2       Results from last 7 days  Lab Units 02/05/19  0511   POTASSIUM mmol/L 2 9*   CHLORIDE mmol/L 102   CO2 mmol/L 31   BUN mg/dL 6   CREATININE mg/dL 0 52*   CALCIUM mg/dL 7 3*   ALK PHOS U/L 149*   ALT U/L 27   AST U/L 37           * I Have Reviewed All Lab Data Listed Above  * Additional Pertinent Lab Tests Reviewed: All Labs Within Last 24 Hours Reviewed    Imaging:  Us Abdomen Complete    Result Date: 2/4/2019  Narrative: ABDOMEN ULTRASOUND, COMPLETE INDICATION:   RUQ PAIN, NO FEVER, NO ELEVATED WBC R/O cholecystitis  COMPARISON: Abdomen ultrasound from 9/26/2018, and abdomen pelvic CT from 1/30/2019  TECHNIQUE:   Real-time ultrasound of the abdomen was performed with a curvilinear transducer with both volumetric sweeps and still imaging techniques  FINDINGS: PANCREAS:  Visualized portions of the pancreas are within normal limits  AORTA AND IVC:  Visualized portions are normal for patient age  LIVER: Size:  Mildly enlarged  The liver measures 18 2 cm in the midclavicular line  Contour:  Surface contour is smooth  Parenchyma:  Echogenicity and echotexture are within normal limits  No evidence of suspicious mass  Limited imaging of the main portal vein shows it to be patent and hepatopetal  BILIARY: The gallbladder is normal in caliber  Gallbladder wall mildly thickened at 3 mm  No pericholecystic fluid  Multiple small gallstones in the gallbladder  Sonographic Cee's sign cannot be accurately assessed because patient had recent pain medication administration, thus limiting ultrasound assessment of acute cholecystitis  No intrahepatic biliary dilatation  CBD measures 5 mm  No choledocholithiasis  KIDNEY: Right kidney measures 11 9 cm  Within normal limits  Left kidney measures 12 9 cm  Within normal limits  SPLEEN: Measures 12 7 x 11 6 x 6 4 cm  Within normal limits  ASCITES:  None  Impression: Multiple small gallstones in the gallbladder, and mild gallbladder thickening at 3 mm  There is no gallbladder distention or pericholecystic fluid  Sonographic Cee's sign cannot be accurately assessed because patient had recent pain medication administration, thus limiting ultrasound assessment of acute cholecystitis  Workstation performed: [id number]     Xr Chest Portable Icu    Result Date: 2/2/2019  Narrative: CHEST INDICATION:   tachypnea  COMPARISON:  None EXAM PERFORMED/VIEWS:  XR CHEST PORTABLE ICU FINDINGS: Heart top normal in size  Pulmonary vessels prominent and indistinct  Right lower lobe subsegmental atelectasis  Lungs and pleural spaces otherwise clear  No evidence of pneumothorax  Osseous structures appear within normal limits for patient age  Impression: 1  Pulmonary vascular congestion  2   Right lower lobe subsegmental atelectasis  Workstation performed: [id number]     Ct Abdomen Pelvis With Contrast    Result Date: 1/30/2019  Narrative: CT ABDOMEN AND PELVIS WITH IV CONTRAST INDICATION:   Abdominal pain, unspecified  COMPARISON:  CT abdomen and pelvis 9/25/2018  TECHNIQUE:  CT examination of the abdomen and pelvis was performed  Axial, sagittal, and coronal 2D reformatted images were created from the source data and submitted for interpretation  Radiation dose length product (DLP) for this visit:  2371 mGy-cm   This examination, like all CT scans performed in the [place], was performed utilizing techniques to minimize radiation dose exposure, including the use of iterative reconstruction and automated exposure control  IV Contrast:  100 mL of iohexol (OMNIPAQUE) Enteric Contrast:  Enteric contrast was not administered  FINDINGS: ABDOMEN LOWER CHEST:  Mild patchy atelectasis in the lower lobes, lingula and right middle lobe  LIVER/BILIARY TREE:  Stable hepatomegaly with steatosis   GALLBLADDER:  There are gallstone(s) within the gallbladder, without pericholecystic inflammatory changes  SPLEEN:  Unremarkable  PANCREAS:  Extensive edematous change in the pancreatic head and uncinate process with surrounding fat stranding and ill-defined peripancreatic fluid consistent with acute pancreatitis  There is mild peripancreatic fat stranding in the body and tail of the pancreas  There is ill-defined fluid in the anterior pararenal space and some of the fluid extends inferiorly in the retroperitoneum  There is inflammatory change about the 2nd and 3rd portions of the duodenum  No organized pancreatic or peripancreatic fluid collection  ADRENAL GLANDS:  Unremarkable  KIDNEYS/URETERS:  One or more sharply circumscribed subcentimeter renal hypodensities are noted  These lesions are too small to accurately characterize, but are statistically most likely to represent benign cortical renal cyst(s)  According to the guidelines published in the [facility] Paper of the ACR Incidental Findings Committee (Radiology 2010), no further workup of these lesions is recommended  No hydronephrosis  STOMACH AND BOWEL:  Postsurgical changes of gastric bypass  No bowel obstruction  APPENDIX:  No findings to suggest appendicitis  ABDOMINOPELVIC CAVITY:  No ascites or free intraperitoneal air  No lymphadenopathy  VESSELS:  Unremarkable for patient's age  PELVIS REPRODUCTIVE ORGANS:  Unremarkable for patient's age  URINARY BLADDER:  Unremarkable  ABDOMINAL WALL/INGUINAL REGIONS:  Stable ventral abdominal wall hernia containing omental fat  OSSEOUS STRUCTURES:  No acute fracture or destructive osseous lesion  Impression: 1  Acute pancreatitis  No organized pancreatic or peripancreatic fluid collection  2   Cholelithiasis without evidence of cholecystitis  3   Stable hepatomegaly with steatosis  The study was marked in [facility] for immediate notification   Workstation performed: [id number]     Imaging Reports Reviewed by myself    Cultures:   Blood Culture: No results found for: BLOODCX  Urine Culture: No results found for: URINECX  Sputum Culture: No components found for: SPUTUMCX  Wound Culture: No results found for: WOUNDCULT    Last 24 Hours Medication List:     Current Facility-Administered Medications:  albuterol 2 puff Inhalation Q4H PRN Climmie Gums, DO    chlordiazePOXIDE 25 mg Oral Q8H [facility] Jorge Eugene MD    haloperidol lactate 5 mg Intravenous Q6H PRN Jessica Montgomery MD    heparin (porcine) 5,000 Units Subcutaneous [facility] Jessica Montgomery MD    HYDROmorphone 0 5 mg Intravenous Q4H PRN Jorge Eugene MD    LORazepam 1 mg Intravenous Q6H PRN Jorge Eugene MD    magnesium sulfate 1 g Intravenous Once Angela Miles MD    ondansetron 4 mg Intravenous Q4H PRN Jessica Montgomery MD    potassium chloride 40 mEq Oral Q4H Nereida Yarbrough MD    sodium chloride 1 spray Each Nare Q1H PRN Jessica Montgomery MD    IVPB builder  Intravenous Q24H Jessica Montgomery MD Last Rate: Stopped (02/04/19 3980)        Today, Patient Was Seen By: Angela Miles MD    ** Please Note: "This note has been constructed using a voice recognition system  Therefore there may be syntax, spelling, and/or grammatical errors   Please call if you have any questions  "**

## 2019-02-06 ENCOUNTER — ANESTHESIA (INPATIENT)
Dept: PERIOP | Facility: HOSPITAL | Age: 44
DRG: 411 | End: 2019-02-06
Payer: COMMERCIAL

## 2019-02-06 ENCOUNTER — ANESTHESIA EVENT (INPATIENT)
Dept: PERIOP | Facility: HOSPITAL | Age: 44
DRG: 411 | End: 2019-02-06
Payer: COMMERCIAL

## 2019-02-06 ENCOUNTER — APPOINTMENT (INPATIENT)
Dept: RADIOLOGY | Facility: HOSPITAL | Age: 44
DRG: 411 | End: 2019-02-06
Payer: COMMERCIAL

## 2019-02-06 LAB
ALBUMIN SERPL BCP-MCNC: 2.4 G/DL (ref 3.5–5)
ALP SERPL-CCNC: 155 U/L (ref 46–116)
ALT SERPL W P-5'-P-CCNC: 26 U/L (ref 12–78)
AMYLASE SERPL-CCNC: 139 IU/L (ref 25–115)
ANION GAP SERPL CALCULATED.3IONS-SCNC: 10 MMOL/L (ref 4–13)
AST SERPL W P-5'-P-CCNC: 31 U/L (ref 5–45)
BASOPHILS # BLD MANUAL: 0 THOUSAND/UL (ref 0–0.1)
BASOPHILS NFR MAR MANUAL: 0 % (ref 0–1)
BILIRUB SERPL-MCNC: 1.3 MG/DL (ref 0.2–1)
BUN SERPL-MCNC: 6 MG/DL (ref 5–25)
CALCIUM SERPL-MCNC: 7.8 MG/DL (ref 8.3–10.1)
CHLORIDE SERPL-SCNC: 103 MMOL/L (ref 100–108)
CO2 SERPL-SCNC: 26 MMOL/L (ref 21–32)
CREAT SERPL-MCNC: 0.53 MG/DL (ref 0.6–1.3)
EOSINOPHIL # BLD MANUAL: 0.22 THOUSAND/UL (ref 0–0.4)
EOSINOPHIL NFR BLD MANUAL: 3 % (ref 0–6)
ERYTHROCYTE [DISTWIDTH] IN BLOOD BY AUTOMATED COUNT: 12.7 % (ref 11.6–15.1)
GFR SERPL CREATININE-BSD FRML MDRD: 117 ML/MIN/1.73SQ M
GLUCOSE SERPL-MCNC: 89 MG/DL (ref 65–140)
HCT VFR BLD AUTO: 40.4 % (ref 34.8–46.1)
HGB BLD-MCNC: 13.3 G/DL (ref 11.5–15.4)
LIPASE SERPL-CCNC: 1711 U/L (ref 73–393)
LYMPHOCYTES # BLD AUTO: 0.89 THOUSAND/UL (ref 0.6–4.47)
LYMPHOCYTES # BLD AUTO: 12 % (ref 14–44)
MAGNESIUM SERPL-MCNC: 1.8 MG/DL (ref 1.6–2.6)
MCH RBC QN AUTO: 35.1 PG (ref 26.8–34.3)
MCHC RBC AUTO-ENTMCNC: 32.9 G/DL (ref 31.4–37.4)
MCV RBC AUTO: 107 FL (ref 82–98)
METAMYELOCYTES NFR BLD MANUAL: 5 % (ref 0–1)
MONOCYTES # BLD AUTO: 0.52 THOUSAND/UL (ref 0–1.22)
MONOCYTES NFR BLD: 7 % (ref 4–12)
MYELOCYTES NFR BLD MANUAL: 3 % (ref 0–1)
NEUTROPHILS # BLD MANUAL: 5.11 THOUSAND/UL (ref 1.85–7.62)
NEUTS SEG NFR BLD AUTO: 69 % (ref 43–75)
NRBC BLD AUTO-RTO: 1 /100 WBC (ref 0–2)
PLATELET # BLD AUTO: 209 THOUSANDS/UL (ref 149–390)
PLATELET BLD QL SMEAR: ADEQUATE
PMV BLD AUTO: 11.6 FL (ref 8.9–12.7)
POTASSIUM SERPL-SCNC: 3.4 MMOL/L (ref 3.5–5.3)
PROT SERPL-MCNC: 6.5 G/DL (ref 6.4–8.2)
RBC # BLD AUTO: 3.79 MILLION/UL (ref 3.81–5.12)
RBC MORPH BLD: NORMAL
SODIUM SERPL-SCNC: 139 MMOL/L (ref 136–145)
TOTAL CELLS COUNTED SPEC: 100
VARIANT LYMPHS # BLD AUTO: 1 %
WBC # BLD AUTO: 7.4 THOUSAND/UL (ref 4.31–10.16)

## 2019-02-06 PROCEDURE — 88304 TISSUE EXAM BY PATHOLOGIST: CPT | Performed by: PATHOLOGY

## 2019-02-06 PROCEDURE — 47562 LAPAROSCOPIC CHOLECYSTECTOMY: CPT | Performed by: SURGERY

## 2019-02-06 PROCEDURE — 83690 ASSAY OF LIPASE: CPT | Performed by: NURSE PRACTITIONER

## 2019-02-06 PROCEDURE — 83735 ASSAY OF MAGNESIUM: CPT | Performed by: INTERNAL MEDICINE

## 2019-02-06 PROCEDURE — 85007 BL SMEAR W/DIFF WBC COUNT: CPT | Performed by: NURSE PRACTITIONER

## 2019-02-06 PROCEDURE — 0FT44ZZ RESECTION OF GALLBLADDER, PERCUTANEOUS ENDOSCOPIC APPROACH: ICD-10-PCS | Performed by: SURGERY

## 2019-02-06 PROCEDURE — 99232 SBSQ HOSP IP/OBS MODERATE 35: CPT | Performed by: INTERNAL MEDICINE

## 2019-02-06 PROCEDURE — 94760 N-INVAS EAR/PLS OXIMETRY 1: CPT

## 2019-02-06 PROCEDURE — 0FJB4ZZ INSPECTION OF HEPATOBILIARY DUCT, PERCUTANEOUS ENDOSCOPIC APPROACH: ICD-10-PCS | Performed by: SURGERY

## 2019-02-06 PROCEDURE — 47562 LAPAROSCOPIC CHOLECYSTECTOMY: CPT | Performed by: PHYSICIAN ASSISTANT

## 2019-02-06 PROCEDURE — 80053 COMPREHEN METABOLIC PANEL: CPT | Performed by: NURSE PRACTITIONER

## 2019-02-06 PROCEDURE — 82150 ASSAY OF AMYLASE: CPT | Performed by: NURSE PRACTITIONER

## 2019-02-06 PROCEDURE — 99232 SBSQ HOSP IP/OBS MODERATE 35: CPT | Performed by: SURGERY

## 2019-02-06 PROCEDURE — 85027 COMPLETE CBC AUTOMATED: CPT | Performed by: NURSE PRACTITIONER

## 2019-02-06 PROCEDURE — 94660 CPAP INITIATION&MGMT: CPT

## 2019-02-06 RX ORDER — FENTANYL CITRATE 50 UG/ML
INJECTION, SOLUTION INTRAMUSCULAR; INTRAVENOUS AS NEEDED
Status: DISCONTINUED | OUTPATIENT
Start: 2019-02-06 | End: 2019-02-06 | Stop reason: SURG

## 2019-02-06 RX ORDER — DIPHENHYDRAMINE HYDROCHLORIDE 50 MG/ML
12.5 INJECTION INTRAMUSCULAR; INTRAVENOUS ONCE AS NEEDED
Status: DISCONTINUED | OUTPATIENT
Start: 2019-02-06 | End: 2019-02-06 | Stop reason: HOSPADM

## 2019-02-06 RX ORDER — HYDROMORPHONE HCL/PF 1 MG/ML
0.5 SYRINGE (ML) INJECTION
Status: DISCONTINUED | OUTPATIENT
Start: 2019-02-06 | End: 2019-02-06 | Stop reason: HOSPADM

## 2019-02-06 RX ORDER — NEOSTIGMINE METHYLSULFATE 1 MG/ML
INJECTION INTRAVENOUS AS NEEDED
Status: DISCONTINUED | OUTPATIENT
Start: 2019-02-06 | End: 2019-02-06 | Stop reason: SURG

## 2019-02-06 RX ORDER — PROPOFOL 10 MG/ML
INJECTION, EMULSION INTRAVENOUS AS NEEDED
Status: DISCONTINUED | OUTPATIENT
Start: 2019-02-06 | End: 2019-02-06 | Stop reason: SURG

## 2019-02-06 RX ORDER — ONDANSETRON 2 MG/ML
INJECTION INTRAMUSCULAR; INTRAVENOUS AS NEEDED
Status: DISCONTINUED | OUTPATIENT
Start: 2019-02-06 | End: 2019-02-06 | Stop reason: SURG

## 2019-02-06 RX ORDER — LABETALOL HYDROCHLORIDE 5 MG/ML
INJECTION, SOLUTION INTRAVENOUS AS NEEDED
Status: DISCONTINUED | OUTPATIENT
Start: 2019-02-06 | End: 2019-02-06 | Stop reason: SURG

## 2019-02-06 RX ORDER — KETOROLAC TROMETHAMINE 30 MG/ML
INJECTION, SOLUTION INTRAMUSCULAR; INTRAVENOUS AS NEEDED
Status: DISCONTINUED | OUTPATIENT
Start: 2019-02-06 | End: 2019-02-06 | Stop reason: SURG

## 2019-02-06 RX ORDER — CLINDAMYCIN PHOSPHATE 900 MG/50ML
900 INJECTION INTRAVENOUS ONCE
Status: DISCONTINUED | OUTPATIENT
Start: 2019-02-06 | End: 2019-02-08 | Stop reason: HOSPADM

## 2019-02-06 RX ORDER — OXYCODONE HYDROCHLORIDE 5 MG/1
5 TABLET ORAL EVERY 4 HOURS PRN
Status: DISCONTINUED | OUTPATIENT
Start: 2019-02-06 | End: 2019-02-08 | Stop reason: HOSPADM

## 2019-02-06 RX ORDER — MIDAZOLAM HYDROCHLORIDE 1 MG/ML
INJECTION INTRAMUSCULAR; INTRAVENOUS AS NEEDED
Status: DISCONTINUED | OUTPATIENT
Start: 2019-02-06 | End: 2019-02-06 | Stop reason: SURG

## 2019-02-06 RX ORDER — OXYCODONE HYDROCHLORIDE 5 MG/1
10 TABLET ORAL EVERY 4 HOURS PRN
Status: DISCONTINUED | OUTPATIENT
Start: 2019-02-06 | End: 2019-02-08 | Stop reason: HOSPADM

## 2019-02-06 RX ORDER — ONDANSETRON 2 MG/ML
4 INJECTION INTRAMUSCULAR; INTRAVENOUS ONCE AS NEEDED
Status: DISCONTINUED | OUTPATIENT
Start: 2019-02-06 | End: 2019-02-06 | Stop reason: HOSPADM

## 2019-02-06 RX ORDER — ROCURONIUM BROMIDE 10 MG/ML
INJECTION, SOLUTION INTRAVENOUS AS NEEDED
Status: DISCONTINUED | OUTPATIENT
Start: 2019-02-06 | End: 2019-02-06 | Stop reason: SURG

## 2019-02-06 RX ORDER — FENTANYL CITRATE/PF 50 MCG/ML
25 SYRINGE (ML) INJECTION
Status: DISCONTINUED | OUTPATIENT
Start: 2019-02-06 | End: 2019-02-06 | Stop reason: HOSPADM

## 2019-02-06 RX ORDER — CEFAZOLIN SODIUM 2 G/50ML
SOLUTION INTRAVENOUS AS NEEDED
Status: DISCONTINUED | OUTPATIENT
Start: 2019-02-06 | End: 2019-02-06 | Stop reason: SURG

## 2019-02-06 RX ORDER — MAGNESIUM HYDROXIDE 1200 MG/15ML
LIQUID ORAL AS NEEDED
Status: DISCONTINUED | OUTPATIENT
Start: 2019-02-06 | End: 2019-02-06 | Stop reason: HOSPADM

## 2019-02-06 RX ORDER — SODIUM CHLORIDE, SODIUM LACTATE, POTASSIUM CHLORIDE, CALCIUM CHLORIDE 600; 310; 30; 20 MG/100ML; MG/100ML; MG/100ML; MG/100ML
INJECTION, SOLUTION INTRAVENOUS CONTINUOUS PRN
Status: DISCONTINUED | OUTPATIENT
Start: 2019-02-06 | End: 2019-02-06 | Stop reason: SURG

## 2019-02-06 RX ORDER — GLYCOPYRROLATE 0.2 MG/ML
INJECTION INTRAMUSCULAR; INTRAVENOUS AS NEEDED
Status: DISCONTINUED | OUTPATIENT
Start: 2019-02-06 | End: 2019-02-06 | Stop reason: SURG

## 2019-02-06 RX ADMIN — HYDROMORPHONE HYDROCHLORIDE 0.5 MG: 1 INJECTION, SOLUTION INTRAMUSCULAR; INTRAVENOUS; SUBCUTANEOUS at 14:08

## 2019-02-06 RX ADMIN — HYDROMORPHONE HYDROCHLORIDE 0.5 MG: 1 INJECTION, SOLUTION INTRAMUSCULAR; INTRAVENOUS; SUBCUTANEOUS at 16:20

## 2019-02-06 RX ADMIN — CEFAZOLIN SODIUM 2000 MG: 2 SOLUTION INTRAVENOUS at 11:20

## 2019-02-06 RX ADMIN — CHLORDIAZEPOXIDE HYDROCHLORIDE 25 MG: 25 CAPSULE ORAL at 05:54

## 2019-02-06 RX ADMIN — CHLORDIAZEPOXIDE HYDROCHLORIDE 25 MG: 25 CAPSULE ORAL at 16:05

## 2019-02-06 RX ADMIN — CHLORDIAZEPOXIDE HYDROCHLORIDE 25 MG: 25 CAPSULE ORAL at 21:16

## 2019-02-06 RX ADMIN — HEPARIN SODIUM 5000 UNITS: 5000 INJECTION INTRAVENOUS; SUBCUTANEOUS at 16:06

## 2019-02-06 RX ADMIN — GLYCOPYRROLATE 0.2 MG: 0.2 INJECTION, SOLUTION INTRAMUSCULAR; INTRAVENOUS at 11:20

## 2019-02-06 RX ADMIN — METRONIDAZOLE: 500 SOLUTION INTRAVENOUS at 11:21

## 2019-02-06 RX ADMIN — OXYCODONE HYDROCHLORIDE 10 MG: 5 TABLET ORAL at 20:07

## 2019-02-06 RX ADMIN — ONDANSETRON 4 MG: 2 INJECTION INTRAMUSCULAR; INTRAVENOUS at 19:35

## 2019-02-06 RX ADMIN — ONDANSETRON 4 MG: 2 INJECTION, SOLUTION INTRAMUSCULAR; INTRAVENOUS at 12:05

## 2019-02-06 RX ADMIN — NEOSTIGMINE METHYLSULFATE 4 MG: 1 INJECTION INTRAVENOUS at 12:00

## 2019-02-06 RX ADMIN — LABETALOL HYDROCHLORIDE 10 MG: 5 INJECTION, SOLUTION INTRAVENOUS at 12:45

## 2019-02-06 RX ADMIN — HYDROMORPHONE HYDROCHLORIDE 0.5 MG: 1 INJECTION, SOLUTION INTRAMUSCULAR; INTRAVENOUS; SUBCUTANEOUS at 01:29

## 2019-02-06 RX ADMIN — KETOROLAC TROMETHAMINE 15 MG: 30 INJECTION, SOLUTION INTRAMUSCULAR at 12:05

## 2019-02-06 RX ADMIN — FENTANYL CITRATE 50 MCG: 50 INJECTION, SOLUTION INTRAMUSCULAR; INTRAVENOUS at 12:00

## 2019-02-06 RX ADMIN — HYDROMORPHONE HYDROCHLORIDE 0.5 MG: 1 INJECTION, SOLUTION INTRAMUSCULAR; INTRAVENOUS; SUBCUTANEOUS at 21:07

## 2019-02-06 RX ADMIN — FENTANYL CITRATE 25 MCG: 50 INJECTION, SOLUTION INTRAMUSCULAR; INTRAVENOUS at 13:25

## 2019-02-06 RX ADMIN — FENTANYL CITRATE 100 MCG: 50 INJECTION, SOLUTION INTRAMUSCULAR; INTRAVENOUS at 11:20

## 2019-02-06 RX ADMIN — FENTANYL CITRATE 25 MCG: 50 INJECTION, SOLUTION INTRAMUSCULAR; INTRAVENOUS at 13:17

## 2019-02-06 RX ADMIN — GLYCOPYRROLATE 0.4 MG: 0.2 INJECTION, SOLUTION INTRAMUSCULAR; INTRAVENOUS at 12:00

## 2019-02-06 RX ADMIN — MIDAZOLAM HYDROCHLORIDE 2 MG: 1 INJECTION, SOLUTION INTRAMUSCULAR; INTRAVENOUS at 11:20

## 2019-02-06 RX ADMIN — ROCURONIUM BROMIDE 20 MG: 10 INJECTION, SOLUTION INTRAVENOUS at 12:00

## 2019-02-06 RX ADMIN — PROPOFOL 300 MG: 10 INJECTION, EMULSION INTRAVENOUS at 11:25

## 2019-02-06 RX ADMIN — HEPARIN SODIUM 5000 UNITS: 5000 INJECTION INTRAVENOUS; SUBCUTANEOUS at 05:54

## 2019-02-06 RX ADMIN — ROCURONIUM BROMIDE 50 MG: 10 INJECTION, SOLUTION INTRAVENOUS at 11:25

## 2019-02-06 RX ADMIN — SODIUM CHLORIDE, SODIUM LACTATE, POTASSIUM CHLORIDE, AND CALCIUM CHLORIDE: .6; .31; .03; .02 INJECTION, SOLUTION INTRAVENOUS at 11:16

## 2019-02-06 RX ADMIN — FOLIC ACID: 5 INJECTION, SOLUTION INTRAMUSCULAR; INTRAVENOUS; SUBCUTANEOUS at 16:20

## 2019-02-06 RX ADMIN — HEPARIN SODIUM 5000 UNITS: 5000 INJECTION INTRAVENOUS; SUBCUTANEOUS at 21:16

## 2019-02-06 NOTE — OP NOTE
CHOLECYSTECTOMY LAPAROSCOPIC with ioc  Postoperative Note  PATIENT NAME: Ron Jackson  : 1975  MRN: [id number]  [facility] OR ROOM 02    Surgery Date: 2019    Pre-op Dx:  Cholelithiasis [K80 20]    Post-Op Diagnosis Codes:     * Cholelithiasis [K80 20]    Procedure(s):  CHOLECYSTECTOMY LAPAROSCOPIC  Surgeon(s) and Role:     * Cony Campuzano MD - Primary     * Nahomi Chavez PA-C - Assisting    The Physician Assistant was medically necessary for surgical safety the case including suturing, retraction, and hemostasis  Specimens:  ID Type Source Tests Collected by Time Destination   1 :  Tissue Gallbladder TISSUE EXAM Cony Campuzano MD 2019 1202        Estimated Blood Loss:   Minimal    Anesthesia Type:   General     Procedure: The patient was seen again in the Holding Room  The risks, benefits, complications, treatment options, and expected outcomes were discussed with the patient  The possibilities of reaction to medication, pulmonary aspiration, perforation of viscus, bleeding, recurrent infection, finding a normal gallbladder, the need for additional procedures, failure to diagnose a condition, the possible need to convert to an open procedure, and creating a complication requiring transfusion or operation were discussed with the patient  The patient and/or family concurred with the proposed plan, giving informed consent  The site of surgery properly noted/marked  The patient was taken to Operating Room, identified as Ron Jackson  and the procedure verified as Laparoscopic Cholecystectomy with possible Intraoperative Cholangiogram  A Time Out was held after prepping and draping in sterile fashion  The above information was confirmed  Prior to the induction of general anesthesia, antibiotic prophylaxis was administered  General endotracheal anesthesia was then administered and tolerated well  After the induction, the abdomen was prepped in the usual sterile fashion   The patient was positioned in the supine position, along with some reverse Trendelenburg  Local anesthetic agent was injected into the skin near the umbilicus and an incision made  The midline fascia was incised and the open technique was used to introduce a  port under direct vision  Pneumoperitoneum was then created with CO2 and tolerated well without any adverse changes in the patient's vital signs  Additional trocars were introduced under direct vision  All skin incisions were infiltrated with a local anesthetic agent before making the incision and placing the trocars  The patient was placed in the head up, left side down position  The gallbladder was identified, the fundus grasped and retracted cephalad and laterally  Adhesions were lysed bluntly and with the electrocautery or harmonic scapel  where indicated, taking care not to injure any adjacent organs or viscus  The infundibulum was grasped and retracted laterally, exposing the peritoneum overlying the triangle of Calot  This was then dissected anteriorily and posteriorly from the gallbladder,  and exposed in a blunt fashion or using cautery or harmonic scapel where appropriate  The cystic duct was clearly identified and  dissected circumferentially, as was the cystic artery, as the only two tubular structures leading into the gallbladder   The critical view of the [address] was identified and opened  The posterior aspect of the gallbladder was lifted off the cystic plate, to insure that there were no posterior structres behind the gallbladder or leading into the liver  An IOC was attempted but the cystic duct was too small for the catheter to be inserted so it was aborted  Once this was all clearly identified, the cystic duct was then doubly ligated with surgical clips and/or Endoloop suture on the patient side and singly clipped on the gallbladder side and divided  The cystic artery was re-identified,  ligated with clips and divided as well   If necessary, the cystic duct was "miked" back of any stones felt in the cystic duct, prior to clipping the patient side of the duct  The gallbladder was dissected from the liver bed in retrograde fashion with the electrocautery and/or harmonic scalpel where appropriate  The gallbladder was removed, via an endopouch, through the umbilical port  The liver bed was irrigated and inspected  Hemostasis was achieved with the electrocautery  Copious irrigation was utilized and was repeatedly aspirated until clear  Pneumoperitoneum was completely reduced after viewing removal of the trocars under direct vision  The wound was thoroughly irrigated and any fascia defect was then closed with a figure of eight suture 0-vicryl; the skin was then closed with 4-0 monocryl and a sterile dressings were applied  Instrument, sponge, and needle counts were correct at closure and at the conclusion of the case  This text is generated with voice recognition software  There may be translation, syntax,  or grammatical errors  If you have any questions, please contact the dictating provider       Complications: None    Condition: Stable to PACU    SIGNATURE: Mellissa Stephen MD   DATE: February 6, 2019   TIME: 2:30 PM

## 2019-02-06 NOTE — PROGRESS NOTES
Emilia Churchill  [id number]    37 y o   female      ASSESSMENT and PLAN    Alcohol withdrawal delirium, acute, hyperactive ([address] )   Assessment & Plan    Awake alert oriented  No further signs of alcohol withdrawal    Plan:  Continue to follow     Cholelithiasis   Assessment & Plan      HIDA scan showed nonvisualization of the gallbladder  Likely acute cholecystitis    Plan:  Scheduled for surgery today for a laparoscopic cholecystectomy and intraoperative cholangiogram      * Acute pancreatitis   Assessment & Plan    Likely secondary to a combination of acute calculous cholecystitis and alcohol dependence    Plan:  Scheduled for laparoscopic cholecystectomy with intraoperative cholangiogram today         Chief Complaint   Patient presents with    Abdominal Pain     Patient presents to the ER stating she started with RUQ abdominal pain yesterday, has history of gall stones       SUBJECTIVE/HPI abdominal pain reoccur last evening  Remains NPO  Scheduled for surgery today      /59 (BP Location: Left arm)   Pulse 85   Temp 98 °F (36 7 °C) (Axillary)   Resp 20   Ht 5' 6" (1 676 m)   Wt (!) 145 kg (319 lb 0 1 oz)   LMP 01/16/2019   SpO2 94%   BMI 51 49 kg/m²     PHYSICALEXAM  General appearance: alert, appears stated age and cooperative  Head: Normocephalic, without obvious abnormality, atraumatic  Lungs: clear to auscultation bilaterally  Heart: regular rate and rhythm, S1, S2 normal  Abdomen: soft, non-tender; bowel sounds normal  Extremities: extremities normal, atraumatic, no cyanosis or edema  Neurologic: Grossly normal    Lab Results   Component Value Date    CALCIUM 7 3 (L) 02/05/2019    K 2 9 (L) 02/05/2019    CO2 31 02/05/2019     02/05/2019    BUN 6 02/05/2019    CREATININE 0 52 (L) 02/05/2019     Lab Results   Component Value Date    WBC 7 40 02/06/2019    HGB 13 3 02/06/2019    HCT 40 4 02/06/2019     (H) 02/06/2019     02/06/2019     Lab Results   Component Value Date ALT 27 02/05/2019    AST 37 02/05/2019    ALKPHOS 149 (H) 02/05/2019     Lab Results   Component Value Date    AMYLASE 69 02/05/2019     Lab Results   Component Value Date    LIPASE 753 (H) 02/05/2019     No results found for: IRON, TIBC, FERRITIN  No results found for: INR    Counseling / Coordination of Care  Total floor / unit time spent today 20 minutes

## 2019-02-06 NOTE — ANESTHESIA PREPROCEDURE EVALUATION
Review of Systems/Medical History  Patient summary reviewed  Chart reviewed      Cardiovascular  EKG reviewed, Negative cardio ROS    Pulmonary  Negative pulmonary ROS        GI/Hepatic  Negative GI/hepatic ROS          Negative  ROS        Endo/Other  Negative endo/other ROS      GYN  Negative gynecology ROS          Hematology  Negative hematology ROS      Musculoskeletal  Negative musculoskeletal ROS        Neurology  Negative neurology ROS      Psychology   Negative psychology ROS              Physical Exam    Airway    Mallampati score: II  TM Distance: >3 FB  Neck ROM: full     Dental       Cardiovascular  Comment: Negative ROS, Rhythm: regular, Rate: normal,     Pulmonary      Other Findings        Anesthesia Plan  ASA Score- 3     Anesthesia Type- general with ASA Monitors  Additional Monitors:   Airway Plan: ETT  Plan Factors-    Induction- intravenous  Postoperative Plan- Plan for postoperative opioid use  Informed Consent- Anesthetic plan and risks discussed with patient  I personally reviewed this patient with the CRNA  Discussed and agreed on the Anesthesia Plan with the CRNA  Bry Fulton

## 2019-02-06 NOTE — DISCHARGE INSTRUCTIONS
[facility] Instructions      Dr Ravi WAGNER     1  General: Brit Disla will feel pulling sensations around the wound or funny aches and pains around the incisions  This is normal  Even minor surgery is a change in your body and this is your bodys way of reaction to it  If you have had abdominal surgery, it may help to support the incision with a small pillow or blanket for comfort when moving or coughing  2  Wound care:       Glue - Leave glue alone, it will fall off on its own, no need for an additional dressings    3  Water: You may shower over the wound, unless there are drain tubes left in place  Do not bathe or use a pool or hot tub until cleared by the physician  You may shower right over the staples, glue or Steri-Strips and rinse wound with soapy water but do not scrub incision pat dry when you are done  4  Activity: You may go up and down stairs, walk as much as you are comfortable, but walk at least 3 times each day  If you have had abdominal surgery, do not lift anything heavier than 15 pounds for at least 2 weeks, unless cleared by the doctor  5  Diet: You may resume a regular diet  If you had a same-day surgery or overnight stay surgery, you may wish to eat lightly for a few days: soups, crackers, and sandwiches  You may resume a regular diet when ready  6  Medications: Resume all of your previous medications, unless told otherwise by the doctor  Avoid aspirin or ibuprofen (Advil, Motrin, etc ) products for 2-3 days after the date of surgery  You may, at that time, began to take them again  Tylenol is always fine, unless you are taking any narcotic pain medication containing Tylenol (such as Percocet, Darvocet, Vicodin, or anything containing acetaminophen)  Do not take Tylenol if you're taking these medications  You do not need to take the narcotic pain medications unless you are having significant pain and discomfort      7  Driving: He will need someone to drive you home on the day of surgery  Do not drive or make any important decisions while on narcotic pain medication or 24 hours and after anesthesia or sedation for surgery  Generally, you may drive when your off all narcotic pain medications  8  Upset Stomach: You may take Maalox, Tums, or similar items for an upset stomach  If your narcotic pain medication causes an upset stomach, do not take it on an empty stomach  Try taking it with at least some crackers or toast      9  Constipation: Patients often experienced constipation after surgery  You may take over-the-counter medication for this, such as Metamucil, Senokot, Dulcolax, milk of magnesia, etc  You may take a suppository unless you have had anorectal surgery such as a procedure on your hemorrhoids  If you experience significant nausea or vomiting after abdominal surgery, call the office before trying any of these medications  10  Call the office: If you are experiencing any of the following, fevers above 101 5°, significant nausea or vomiting, if the wound develops drainage and/or is excessive redness around the wound, or if you have significant diarrhea or other worsening symptoms  11  Pain: You may be given a prescription for pain  This will be given to the hospital, the day of surgery  12  Sexual Activity: You may resume sexual activity when you feel ready and comfortable and your incision is sealed and healed without apparent infection risk  13  Urination: If you haven't urinated in 6 hours, go directly to the ER for evaluation for urinary retention  14  Follow-up in 2 weeks          [facility]  Phone: [phone]

## 2019-02-06 NOTE — RESPIRATORY THERAPY NOTE
Respiratory Note      02/06/19 1301   Non-Invasive Information   Interface Full face mask   Non-Invasive Ventilation Mode BiPAP   Resp Comments Called to PACU to put pt on Bipap for low sat and apnea   Non-Invasive Settings   FiO2 (%) 60   PEEP/CPAP (cm H2O) 6   Rise Time 1   IPAP (cm) 12 cm   EPAP (cm) 6 cm   Rate (Set) 12   Inspiratory Time (Set) 1   Non-Invasive Readings   Total Rate 13   Leak (lpm) 0   Skin Intervention Skin intact   Vt (mL) (Mech) 579   MV ([facility]) 17   Peak Pressure (Obs) 19   Non-Invasive Alarms   Insp Pressure High (cm H20) 50   Insp Pressure Low (cm H20) 4   MV High (L/min) 20   MV Low (L/min) 4   Vt High (mL) 2500   Vt Low (mL) 100   High Resp Rate (BPM) 40 BPM   Low Resp Rate (BPM) 8 BPM        02/06/19 1301   Non-Invasive Information   Interface Full face mask   Non-Invasive Ventilation Mode BiPAP   Resp Comments Called to PACU to put pt on Bipap for low sat and apnea   Non-Invasive Settings   FiO2 (%) 60   PEEP/CPAP (cm H2O) 6   Rise Time 1   IPAP (cm) 12 cm   EPAP (cm) 6 cm   Rate (Set) 12   Inspiratory Time (Set) 1   Non-Invasive Readings   Total Rate 13   Leak (lpm) 0   Skin Intervention Skin intact   Vt (mL) (Mech) 579   MV (Mech) 17   Peak Pressure (Obs) 19   Non-Invasive Alarms   Insp Pressure High (cm H20) 50   Insp Pressure Low (cm H20) 4   MV High (L/min) 20   MV Low (L/min) 4   Vt High (mL) 2500   Vt Low (mL) 100   High Resp Rate (BPM) 40 BPM   Low Resp Rate (BPM) 8 BPM

## 2019-02-06 NOTE — PROGRESS NOTES
[facility] Progress Note  Patient: Ann Marie Roger 37 y o  female   MRN: [id number]  PCP: Berlin Land MD  Unit/Bed#: [facility] Encounter: [id number]  Date Of Visit: 02/06/19    Problem List:    Principal Problem:    Acute pancreatitis  Active Problems:    Cholelithiasis    Morbid obesity ([facility] [address] )    Alcohol abuse    UTI (urinary tract infection)    Hypokalemia    Alcohol withdrawal delirium, acute, hyperactive ([facility] [address] )    Acute respiratory failure with hypoxemia ([facility] [address] )      Assessment & Plan:      1  Acute pancreatitis: likely 2/2 EtOH abuse; however, CT abd/pelvis also revealed cholelithiasis w/o evidence of cholecystitis    TG WNL    Lipase still trending up    HIDA - concerning for cystic duct obstruction and acute cholecystitis    S/p lap ren POD #0 --> cholelithiasis    Dilaudid PRN pain   2  EtOH abuse w/withdrawal and delirium: Initially required Precedex gtt    Taper Librium as tolerated     C/w thiamine and folic acid    F/u psychiatry outpatient   3  Acute hypoxic respiratory failure 2/2 atelectasis: resolved    4  Morbid obesity: likely 2/2 excess calorie intake    Encourage lifestyle modification including diet and exercise once medically stable   5  Hypokalemia: persistent, replete aggressively w/KCl   6  Elevated T bili and alk phos: likely 2/2 above, trend        VTE Pharmacologic Prophylaxis:   Pharmacologic: Heparin  Mechanical VTE Prophylaxis in Place: Yes    Patient Centered Rounds: I have performed bedside rounds with nursing staff today  Discussions with Specialists or Other Care Team Provider: Yes    Education and Discussions with Family / Patient:Yes    Time Spent for Care: 30 minutes  More than 50% of total time spent on counseling and coordination of care as described above      Current Length of Stay: 7 day(s)    Current Patient Status: Inpatient     Code Status: Level 1 - Full Code    Certification Statement: The patient will continue to require additional inpatient hospital stay due to management of acute pancreatitis , lap ren today       Subjective:   S/p joya mcclure earlier today   Has an ice pack on her abdomen   Wants to speak to psychiatry   Family at bedside       Objective:     Negative for Chest Pain, Palpitations, Shortness of Breath, Nausea, Vomiting, Constipation, Diarrhea, Dizziness  All other 10 review of systems negative and without drastic interval changes from yesterday  Vitals:   Temp (24hrs), Av 4 °F (36 9 °C), Min:97 4 °F (36 3 °C), Max:99 7 °F (37 6 °C)    Temp:  [97 4 °F (36 3 °C)-99 7 °F (37 6 °C)] 99 7 °F (37 6 °C)  HR:  [85-94] 87  Resp:  [20-24] 24  BP: (106-131)/(57-78) 106/57  SpO2:  [94 %-95 %] 94 %  Body mass index is 51 49 kg/m²  Input and Output Summary (last 24 hours): Intake/Output Summary (Last 24 hours) at 19 1313  Last data filed at 19 1215   Gross per 24 hour   Intake              500 ml   Output                0 ml   Net              500 ml       Physical Exam:     Physical Exam   Constitutional: No distress  Morbidly obese   HENT:   Head: Normocephalic and atraumatic  Nose: Nose normal    Eyes: Pupils are equal, round, and reactive to light  Conjunctivae are normal    Neck: Normal range of motion  Neck supple  Cardiovascular: Normal rate, regular rhythm and normal heart sounds  Pulmonary/Chest: Effort normal and breath sounds normal  No respiratory distress  She has no wheezes  She has no rales  Abdominal: Soft  Bowel sounds are normal  She exhibits no distension  There is tenderness  There is no rebound and no guarding  4 surgical incisions    Musculoskeletal: She exhibits no edema  Neurological: She is alert  No cranial nerve deficit  Skin: Skin is warm and dry  No rash noted  Purpura improving    Psychiatric:   Flat affect   Vitals reviewed        Additional Data:     Labs:      Results from last 7 days  Lab Units 19  0800  19  0518   WBC Thousand/uL 7 40  < > 9 27 HEMOGLOBIN g/dL 13 3  < > 12 9   HEMATOCRIT % 40 4  < > 38 3   PLATELETS Thousands/uL 209  < > 158   NEUTROS PCT %  --   --  73   LYMPHS PCT %  --   --  13*   LYMPHO PCT % 12*  < >  --    MONOS PCT %  --   --  9   MONO PCT % 7  < >  --    EOS PCT % 3  < > 2   < > = values in this interval not displayed  Results from last 7 days  Lab Units 02/06/19  0800   POTASSIUM mmol/L 3 4*   CHLORIDE mmol/L 103   CO2 mmol/L 26   BUN mg/dL 6   CREATININE mg/dL 0 53*   CALCIUM mg/dL 7 8*   ALK PHOS U/L 155*   ALT U/L 26   AST U/L 31           * I Have Reviewed All Lab Data Listed Above  * Additional Pertinent Lab Tests Reviewed: All Labs Within Last 24 Hours Reviewed    Imaging:    HIDA scan: 1  Nonvisualization of the gallbladder on delayed imaging  Findings suspicious for cystic duct obstruction and acute cholecystitis  Us Abdomen Complete  Result Date: 2/4/2019  Narrative: ABDOMEN ULTRASOUND, COMPLETE INDICATION:   RUQ PAIN, NO FEVER, NO ELEVATED WBC R/O cholecystitis  COMPARISON: Abdomen ultrasound from 9/26/2018, and abdomen pelvic CT from 1/30/2019  TECHNIQUE:   Real-time ultrasound of the abdomen was performed with a curvilinear transducer with both volumetric sweeps and still imaging techniques  FINDINGS: PANCREAS:  Visualized portions of the pancreas are within normal limits  AORTA AND IVC:  Visualized portions are normal for patient age  LIVER: Size:  Mildly enlarged  The liver measures 18 2 cm in the midclavicular line  Contour:  Surface contour is smooth  Parenchyma:  Echogenicity and echotexture are within normal limits  No evidence of suspicious mass  Limited imaging of the main portal vein shows it to be patent and hepatopetal  BILIARY: The gallbladder is normal in caliber  Gallbladder wall mildly thickened at 3 mm  No pericholecystic fluid  Multiple small gallstones in the gallbladder   Sonographic Cee's sign cannot be accurately assessed because patient had recent pain medication administration, thus limiting ultrasound assessment of acute cholecystitis  No intrahepatic biliary dilatation  CBD measures 5 mm  No choledocholithiasis  KIDNEY: Right kidney measures 11 9 cm  Within normal limits  Left kidney measures 12 9 cm  Within normal limits  SPLEEN: Measures 12 7 x 11 6 x 6 4 cm  Within normal limits  ASCITES:  None  Impression: Multiple small gallstones in the gallbladder, and mild gallbladder thickening at 3 mm  There is no gallbladder distention or pericholecystic fluid  Sonographic Cee's sign cannot be accurately assessed because patient had recent pain medication administration, thus limiting ultrasound assessment of acute cholecystitis   Workstation performed: [id number]     Cultures:   Blood Culture: No results found for: BLOODCX  Urine Culture: No results found for: URINECX  Sputum Culture: No components found for: SPUTUMCX  Wound Culture: No results found for: WOUNDCULT    Last 24 Hours Medication List:     Current Facility-Administered Medications:  albuterol 2 puff Inhalation Q4H PRN Yolo Ambrosia, DO    chlordiazePOXIDE 25 mg Oral Q8H [address] Myah Gibson MD    clindamycin 900 mg Intravenous Once Ravi Mcgee MD    diphenhydrAMINE 12 5 mg Intravenous Once PRN Jani Helen, DO    fentaNYL 25 mcg Intravenous Q5 Min PRN Jani Velásqueza, DO    heparin (porcine) 5,000 Units Subcutaneous Q8H Kevin Olivas MD    HYDROmorphone 0 5 mg Intravenous Q4H PRN Myah Gibson MD    HYDROmorphone 0 5 mg Intravenous Q10 Min PRN Jani Helen, DO    metroNIDAZOLE 500 mg Intravenous Once Ravi Mcgee MD    ondansetron 4 mg Intravenous Q4H PRN Renee Crane MD    ondansetron 4 mg Intravenous Once PRN Jani Cervantes, DO    sodium chloride 1 spray Each Nare Q1H PRN Renee Crane MD    sodium chloride 125 mL/hr Intravenous Continuous Ale Manley MD Last Rate: 125 mL/hr (02/05/19 6215)   IVPB builder  Intravenous Q24H Renee Crane MD Last Rate: 100 mL/hr at 02/05/19 1522        Today, Patient Was Seen By: Trinity Mora MD    ** Please Note: "This note has been constructed using a voice recognition system  Therefore there may be syntax, spelling, and/or grammatical errors   Please call if you have any questions  "**

## 2019-02-06 NOTE — ANESTHESIA POSTPROCEDURE EVALUATION
Post-Op Assessment Note      CV Status:  Stable    Mental Status:  Alert    Hydration Status:  Stable    PONV Controlled:  None    Airway Patency:  Adequate  Airway: intubated    Post Op Vitals Reviewed: Yes          Staff: CRNA           BP      Temp      Pulse     Resp      SpO2
Post-Op Assessment Note      CV Status:  Stable    Mental Status:  Alert and awake    Hydration Status:  Euvolemic    PONV Controlled:  Controlled    Airway Patency:  Patent    Post Op Vitals Reviewed: Yes          Staff: Anesthesiologist       Comments: pt needs cpap post op          BP      Temp      Pulse     Resp      SpO2
Full range of motion of upper and lower extremities, no joint tenderness/swelling.

## 2019-02-06 NOTE — PROGRESS NOTES
Progress Note - General Surgery   Kimber Zhong 37 y o  female MRN: [id number]  Unit/Bed#: OR [place] Encounter: [id number]    Acute recurrent pancreatitis in patient with history of alcohol abuse and known cholelithiasis with elevated bilirubin and transaminases and one week of RUQ pain  -previous admission in September of 2018 with similar symptoms  -lipase, bilirubin and transaminases trending downward, continue to trend  -possibly related to passed common bile duct stone and/or combination of ETOH  -patient continues to complain of RUQ abdominal pain  Remains afebrile without leukocytosis  -HIDA scan without visualization of gallbladder  -will plan for laparoscopic cholecystectomy today  Procedure discussed in detail as well as possible risks and complications with the patient and she has given her consent written form  -will start clears postop    ETOH abuse with withdrawal symptoms  -on [facility] protocol  -continue to monitor and treat withdrawal symptoms     Hypokalemia-replace and monitor     History of Adilson-en-Y gastric bypass in 2002  -initially with significant weight loss but admits to gaining most of her weight back  -continue follow-up with bariatric, should consider weight management program      Subjective/Objective   Chief Complaint:  Abdominal pain    Subjective:  Right upper quadrant abdominal pain unchanged  No nausea or vomiting overnight  HIDA scan without visualization of the gallbladder  Patient would like to have her gallbladder removed  Objective:     Blood pressure 116/59, pulse 85, temperature 98 °F (36 7 °C), temperature source Axillary, resp  rate 20, height 5' 6" (1 676 m), weight (!) 145 kg (319 lb 0 1 oz), last menstrual period 01/16/2019, SpO2 94 %  ,Body mass index is 51 49 kg/m²      No intake or output data in the 24 hours ending 02/06/19 1122    Invasive Devices     Peripheral Intravenous Line            Peripheral IV 02/06/19 Left;Right Hand less than 1 day Physical Exam:   General appearance: Morbidly obese, alert and oriented, in no acute distress  Head: Normocephalic, without obvious abnormality, atraumatic, sclerae anicteric, mucous membranes moist  Neck: no JVD and supple, symmetrical, trachea midline  Lungs: clear to auscultation, no wheezes or rales  Heart:   Regular rate and rhythm, S1-S2 normal, no murmur  Abdomen: Morbidly obese, soft, tender over right upper quadrant without rebound or guarding, active bowel sounds  Extremities:   No edema, redness or tenderness in the calves or thighs  Skin: Warm, dry; incision sites clean, dry intact  Nursing notes and vital signs reviewed    Lab, Imaging and other studies:  I have personally reviewed pertinent lab results    , CBC:   Lab Results   Component Value Date    WBC 7 40 02/06/2019    HGB 13 3 02/06/2019    HCT 40 4 02/06/2019     (H) 02/06/2019     02/06/2019    MCH 35 1 (H) 02/06/2019    MCHC 32 9 02/06/2019    RDW 12 7 02/06/2019    MPV 11 6 02/06/2019    NRBC 1 02/06/2019   , CMP:   Lab Results   Component Value Date    SODIUM 139 02/06/2019    K 3 4 (L) 02/06/2019     02/06/2019    CO2 26 02/06/2019    BUN 6 02/06/2019    CREATININE 0 53 (L) 02/06/2019    CALCIUM 7 8 (L) 02/06/2019    AST 31 02/06/2019    ALT 26 02/06/2019    ALKPHOS 155 (H) 02/06/2019    EGFR 117 02/06/2019     VTE Pharmacologic Prophylaxis: Heparin  VTE Mechanical Prophylaxis: sequential compression device     Heidi Arevalo PA-C

## 2019-02-06 NOTE — INTERIM OP NOTE
CHOLECYSTECTOMY LAPAROSCOPIC with ioc  Postoperative Note  PATIENT NAME: Shameka Patel  : 1975  MRN: [id number]  [facility] OR ROOM 02    Surgery Date: 2019    Preop Diagnosis:  Cholelithiasis [K80 20]    Post-Op Diagnosis Codes:     * Cholelithiasis [K80 20]    Procedure(s) (LRB):  CHOLECYSTECTOMY LAPAROSCOPIC with ioc (N/A)    Surgeon(s) and Role:     * Daniel Victor MD - Primary     * Jorge Arevalo PA-C - Assisting  Earl HYATT  Specimens:  ID Type Source Tests Collected by Time Destination   1 :  Tissue Gallbladder TISSUE EXAM Daniel Victor MD 2019 1202        Estimated Blood Loss:   Minimal    Anesthesia Type:   General ETA    Findings:   no findings of acute cholecystitis  Cystic duct too small to be cannulated for intraoperative ioc  Complications:   None    SIGNATURE: Jorge Arevalo PA-C   DATE: 2019   TIME: 12:50 PM

## 2019-02-07 LAB
ALBUMIN SERPL BCP-MCNC: 2.1 G/DL (ref 3.5–5)
ALP SERPL-CCNC: 136 U/L (ref 46–116)
ALT SERPL W P-5'-P-CCNC: 26 U/L (ref 12–78)
ANION GAP SERPL CALCULATED.3IONS-SCNC: 8 MMOL/L (ref 4–13)
AST SERPL W P-5'-P-CCNC: 52 U/L (ref 5–45)
BILIRUB SERPL-MCNC: 1.3 MG/DL (ref 0.2–1)
BUN SERPL-MCNC: 5 MG/DL (ref 5–25)
CALCIUM SERPL-MCNC: 7.7 MG/DL (ref 8.3–10.1)
CHLORIDE SERPL-SCNC: 103 MMOL/L (ref 100–108)
CO2 SERPL-SCNC: 21 MMOL/L (ref 21–32)
CREAT SERPL-MCNC: 0.45 MG/DL (ref 0.6–1.3)
GFR SERPL CREATININE-BSD FRML MDRD: 123 ML/MIN/1.73SQ M
GLUCOSE SERPL-MCNC: 92 MG/DL (ref 65–140)
LIPASE SERPL-CCNC: 499 U/L (ref 73–393)
POTASSIUM SERPL-SCNC: 4.6 MMOL/L (ref 3.5–5.3)
PROT SERPL-MCNC: 6.2 G/DL (ref 6.4–8.2)
SODIUM SERPL-SCNC: 132 MMOL/L (ref 136–145)

## 2019-02-07 PROCEDURE — 94760 N-INVAS EAR/PLS OXIMETRY 1: CPT

## 2019-02-07 PROCEDURE — 93005 ELECTROCARDIOGRAM TRACING: CPT

## 2019-02-07 PROCEDURE — 83690 ASSAY OF LIPASE: CPT | Performed by: PHYSICIAN ASSISTANT

## 2019-02-07 PROCEDURE — 80053 COMPREHEN METABOLIC PANEL: CPT | Performed by: PHYSICIAN ASSISTANT

## 2019-02-07 PROCEDURE — 99233 SBSQ HOSP IP/OBS HIGH 50: CPT | Performed by: INTERNAL MEDICINE

## 2019-02-07 PROCEDURE — 99232 SBSQ HOSP IP/OBS MODERATE 35: CPT | Performed by: INTERNAL MEDICINE

## 2019-02-07 PROCEDURE — 99024 POSTOP FOLLOW-UP VISIT: CPT | Performed by: PHYSICIAN ASSISTANT

## 2019-02-07 RX ADMIN — OXYCODONE HYDROCHLORIDE 10 MG: 5 TABLET ORAL at 19:53

## 2019-02-07 RX ADMIN — CHLORDIAZEPOXIDE HYDROCHLORIDE 25 MG: 25 CAPSULE ORAL at 06:02

## 2019-02-07 RX ADMIN — OXYCODONE HYDROCHLORIDE 10 MG: 5 TABLET ORAL at 06:03

## 2019-02-07 RX ADMIN — HEPARIN SODIUM 5000 UNITS: 5000 INJECTION INTRAVENOUS; SUBCUTANEOUS at 21:36

## 2019-02-07 RX ADMIN — OXYCODONE HYDROCHLORIDE 10 MG: 5 TABLET ORAL at 11:57

## 2019-02-07 RX ADMIN — CHLORDIAZEPOXIDE HYDROCHLORIDE 25 MG: 25 CAPSULE ORAL at 21:36

## 2019-02-07 RX ADMIN — HEPARIN SODIUM 5000 UNITS: 5000 INJECTION INTRAVENOUS; SUBCUTANEOUS at 15:52

## 2019-02-07 RX ADMIN — SODIUM CHLORIDE 125 ML/HR: 0.9 INJECTION, SOLUTION INTRAVENOUS at 01:07

## 2019-02-07 RX ADMIN — HEPARIN SODIUM 5000 UNITS: 5000 INJECTION INTRAVENOUS; SUBCUTANEOUS at 06:02

## 2019-02-07 RX ADMIN — FOLIC ACID: 5 INJECTION, SOLUTION INTRAMUSCULAR; INTRAVENOUS; SUBCUTANEOUS at 17:23

## 2019-02-07 RX ADMIN — OXYCODONE HYDROCHLORIDE 10 MG: 5 TABLET ORAL at 00:11

## 2019-02-07 RX ADMIN — OXYCODONE HYDROCHLORIDE 10 MG: 5 TABLET ORAL at 15:57

## 2019-02-07 RX ADMIN — CHLORDIAZEPOXIDE HYDROCHLORIDE 25 MG: 25 CAPSULE ORAL at 15:58

## 2019-02-07 RX ADMIN — HYDROMORPHONE HYDROCHLORIDE 0.5 MG: 1 INJECTION, SOLUTION INTRAMUSCULAR; INTRAVENOUS; SUBCUTANEOUS at 08:26

## 2019-02-07 RX ADMIN — HYDROMORPHONE HYDROCHLORIDE 0.5 MG: 1 INJECTION, SOLUTION INTRAMUSCULAR; INTRAVENOUS; SUBCUTANEOUS at 02:58

## 2019-02-07 NOTE — PHYSICAL THERAPY NOTE
PT screen  Order rec'd chart revewied pt is ind without assist no SKilled need sat this time   Screen only d/c PT

## 2019-02-07 NOTE — PROGRESS NOTES
Gerry Parks  [id number]    37 y o   female      ASSESSMENT and PLAN    Alcohol withdrawal delirium, acute, hyperactive ([facility] [address] )   Assessment & Plan    Resolved     Cholelithiasis   Assessment & Plan    Status post laparoscopic cholecystectomy, postop day #1  No findings of acute cholecystitis  IOC unable to be performed as cystic duct was too small to cannulate  LFTs slowly decreasing  Diet advancement per surgery  * Acute pancreatitis   Assessment & Plan    Pancreatitis - likely secondary to a combination of alcohol and gallstones  Awaiting today's lipase result  Follow labs and await today's lipase level            Chief Complaint   Patient presents with    Abdominal Pain     Patient presents to the ER stating she started with RUQ abdominal pain yesterday, has history of gall stones       SUBJECTIVE/HPI   Some postoperative abdominal discomfort  No further severe abdominal pain  Tolerating clears      /51 (BP Location: Left arm)   Pulse 96   Temp 97 6 °F (36 4 °C) (Oral)   Resp 18   Ht 5' 6" (1 676 m)   Wt (!) 145 kg (319 lb 0 1 oz)   LMP 01/16/2019   SpO2 95%   BMI 51 49 kg/m²     PHYSICALEXAM  General appearance: alert, appears stated age and cooperative  Head: Normocephalic, without obvious abnormality, atraumatic  Lungs: clear to auscultation bilaterally  Heart: regular rate and rhythm, S1, S2 normal, no murmur, click, rub or gallop  Abdomen: soft, non-tender; bowel sounds normal; no masses,  no organomegaly  Extremities: extremities normal, atraumatic, no cyanosis or edema  Neurologic: Grossly normal    Lab Results   Component Value Date    CALCIUM 7 7 (L) 02/07/2019    K 4 6 02/07/2019    CO2 21 02/07/2019     02/07/2019    BUN 5 02/07/2019    CREATININE 0 45 (L) 02/07/2019     Lab Results   Component Value Date    WBC 7 40 02/06/2019    HGB 13 3 02/06/2019    HCT 40 4 02/06/2019     (H) 02/06/2019     02/06/2019     Lab Results   Component Value Date    ALT 26 02/07/2019    AST 52 (H) 02/07/2019    ALKPHOS 136 (H) 02/07/2019     Lab Results   Component Value Date    AMYLASE 139 (H) 02/06/2019     Lab Results   Component Value Date    LIPASE 1,711 (H) 02/06/2019     No results found for: IRON, TIBC, FERRITIN  No results found for: INR    Counseling / Coordination of Care  Total floor / unit time spent today 20 minutes

## 2019-02-07 NOTE — PROGRESS NOTES
Progress Note - General Surgery   Magnus Payne 37 y o  female MRN: [id number]  Unit/Bed#: 2 [place] [facility]02 Encounter: [id number]    Acute recurrent pancreatitis in patient with history of alcohol abuse and known cholelithiasis with elevated bilirubin and transaminases and one week of RUQ pain  -bilirubin and transaminases trending downward, lipase slightly elevated yesterday  -possibly related to passed common bile duct stone and/or combination of ETOH  -HIDA scan without visualization of gallbladder  -okay to advance diet if lipase improving  -continue GI management    POD#1 s/p lap ren  -no findings of acute cholecystitis- no gallbladder wall thickening or edema  -unable to do an IOC because cystic duct was too small to cannulate  -states right upper quadrant pain better today, now complains of incisional pain  -okay for clear liquids, await lipase results prior to advancing diet  -continue pain control as needed  -encourage out of bed, ambulation, incentive spirometer    ETOH abuse with withdrawal symptoms  -on [facility] protocol  -continue to monitor and treat withdrawal symptoms     Hypokalemia-improved     History of Adilson-en-Y gastric bypass in 2002  -initially with significant weight loss but admits to gaining most of her weight back  -continue follow-up with bariatric, should consider weight management program     Subjective/Objective   Chief Complaint:  Abdominal pain    Subjective:  Patient complains of abdominal pain  Pain over right upper quadrant is improved, but now complains of incisional pain, worse with movement  Patient is tolerating clear liquids  She denies any nausea or vomiting  She has been out of bed to bathroom  Using incentive spirometer  Objective:    Blood pressure 105/51, pulse 96, temperature 97 6 °F (36 4 °C), temperature source Oral, resp  rate 18, height 5' 6" (1 676 m), weight (!) 145 kg (319 lb 0 1 oz), last menstrual period 01/16/2019, SpO2 95 %  ,Body mass index is 51 49 kg/m²  Intake/Output Summary (Last 24 hours) at 02/07/19 0801  Last data filed at 02/06/19 1215   Gross per 24 hour   Intake              500 ml   Output                0 ml   Net              500 ml       Invasive Devices     Peripheral Intravenous Line            Peripheral IV 02/06/19 Right Hand less than 1 day                Physical Exam:   General appearance: morbidly obese,alert and oriented, in no acute distress  Head: Normocephalic, without obvious abnormality, atraumatic, sclerae anicteric, mucous membranes moist  Neck: no JVD and supple, symmetrical, trachea midline  Lungs: clear to auscultation, no wheezes or rales  Heart:   Regular rate and rhythm, S1-S2 normal, no murmur  Abdomen:   Obese, soft, tender over incision sites, few bowel sounds  Extremities:   No edema, redness or tenderness in the calves or thighs  Skin: Warm, dry; incision sites clean, dry intact  Nursing notes and vital signs reviewed    Lab, Imaging and other studies:  I have personally reviewed pertinent lab results    , CBC: No results found for: WBC, HGB, HCT, MCV, PLT, ADJUSTEDWBC, MCH, MCHC, RDW, MPV, NRBC, CMP:   Lab Results   Component Value Date    SODIUM 132 (L) 02/07/2019    K 4 6 02/07/2019     02/07/2019    CO2 21 02/07/2019    BUN 5 02/07/2019    CREATININE 0 45 (L) 02/07/2019    CALCIUM 7 7 (L) 02/07/2019    AST 52 (H) 02/07/2019    ALT 26 02/07/2019    ALKPHOS 136 (H) 02/07/2019    EGFR 123 02/07/2019     VTE Pharmacologic Prophylaxis: Heparin  VTE Mechanical Prophylaxis: sequential compression device     Tea Arevalo PA-C

## 2019-02-08 VITALS
TEMPERATURE: 97.8 F | OXYGEN SATURATION: 93 % | BODY MASS INDEX: 47.09 KG/M2 | DIASTOLIC BLOOD PRESSURE: 68 MMHG | SYSTOLIC BLOOD PRESSURE: 126 MMHG | RESPIRATION RATE: 17 BRPM | WEIGHT: 293 LBS | HEIGHT: 66 IN | HEART RATE: 92 BPM

## 2019-02-08 PROBLEM — J96.01 ACUTE RESPIRATORY FAILURE WITH HYPOXEMIA (HCC): Status: RESOLVED | Noted: 2019-02-02 | Resolved: 2019-02-08

## 2019-02-08 LAB
ALBUMIN SERPL BCP-MCNC: 2.5 G/DL (ref 3.5–5)
ALP SERPL-CCNC: 125 U/L (ref 46–116)
ALT SERPL W P-5'-P-CCNC: 22 U/L (ref 12–78)
AMYLASE SERPL-CCNC: 84 IU/L (ref 25–115)
ANION GAP SERPL CALCULATED.3IONS-SCNC: 12 MMOL/L (ref 4–13)
AST SERPL W P-5'-P-CCNC: 28 U/L (ref 5–45)
ATRIAL RATE: 94 BPM
BASOPHILS # BLD MANUAL: 0 THOUSAND/UL (ref 0–0.1)
BASOPHILS NFR MAR MANUAL: 0 % (ref 0–1)
BILIRUB SERPL-MCNC: 1 MG/DL (ref 0.2–1)
BUN SERPL-MCNC: 4 MG/DL (ref 5–25)
CALCIUM SERPL-MCNC: 8.3 MG/DL (ref 8.3–10.1)
CHLORIDE SERPL-SCNC: 102 MMOL/L (ref 100–108)
CO2 SERPL-SCNC: 25 MMOL/L (ref 21–32)
CREAT SERPL-MCNC: 0.62 MG/DL (ref 0.6–1.3)
EOSINOPHIL # BLD MANUAL: 0.17 THOUSAND/UL (ref 0–0.4)
EOSINOPHIL NFR BLD MANUAL: 2 % (ref 0–6)
ERYTHROCYTE [DISTWIDTH] IN BLOOD BY AUTOMATED COUNT: 12.7 % (ref 11.6–15.1)
GFR SERPL CREATININE-BSD FRML MDRD: 111 ML/MIN/1.73SQ M
GLUCOSE SERPL-MCNC: 89 MG/DL (ref 65–140)
HCT VFR BLD AUTO: 42.2 % (ref 34.8–46.1)
HGB BLD-MCNC: 13.4 G/DL (ref 11.5–15.4)
LG PLATELETS BLD QL SMEAR: PRESENT
LIPASE SERPL-CCNC: 580 U/L (ref 73–393)
LYMPHOCYTES # BLD AUTO: 1.53 THOUSAND/UL (ref 0.6–4.47)
LYMPHOCYTES # BLD AUTO: 18 % (ref 14–44)
MACROCYTES BLD QL AUTO: PRESENT
MCH RBC QN AUTO: 34.9 PG (ref 26.8–34.3)
MCHC RBC AUTO-ENTMCNC: 31.8 G/DL (ref 31.4–37.4)
MCV RBC AUTO: 110 FL (ref 82–98)
METAMYELOCYTES NFR BLD MANUAL: 6 % (ref 0–1)
MONOCYTES # BLD AUTO: 0.51 THOUSAND/UL (ref 0–1.22)
MONOCYTES NFR BLD: 6 % (ref 4–12)
MYELOCYTES NFR BLD MANUAL: 1 % (ref 0–1)
NEUTROPHILS # BLD MANUAL: 5.68 THOUSAND/UL (ref 1.85–7.62)
NEUTS BAND NFR BLD MANUAL: 7 % (ref 0–8)
NEUTS SEG NFR BLD AUTO: 60 % (ref 43–75)
NRBC BLD AUTO-RTO: 0 /100 WBCS
P AXIS: 35 DEGREES
PLATELET # BLD AUTO: 238 THOUSANDS/UL (ref 149–390)
PLATELET BLD QL SMEAR: ADEQUATE
PMV BLD AUTO: 11.5 FL (ref 8.9–12.7)
POLYCHROMASIA BLD QL SMEAR: PRESENT
POTASSIUM SERPL-SCNC: 3.3 MMOL/L (ref 3.5–5.3)
PR INTERVAL: 184 MS
PROT SERPL-MCNC: 6.5 G/DL (ref 6.4–8.2)
QRS AXIS: 10 DEGREES
QRSD INTERVAL: 98 MS
QT INTERVAL: 396 MS
QTC INTERVAL: 495 MS
RBC # BLD AUTO: 3.84 MILLION/UL (ref 3.81–5.12)
RBC MORPH BLD: PRESENT
SODIUM SERPL-SCNC: 139 MMOL/L (ref 136–145)
T WAVE AXIS: -50 DEGREES
TOTAL CELLS COUNTED SPEC: 100
TOXIC GRANULES BLD QL SMEAR: PRESENT
VENTRICULAR RATE: 94 BPM
WBC # BLD AUTO: 8.48 THOUSAND/UL (ref 4.31–10.16)

## 2019-02-08 PROCEDURE — 99239 HOSP IP/OBS DSCHRG MGMT >30: CPT | Performed by: PHYSICIAN ASSISTANT

## 2019-02-08 PROCEDURE — 80053 COMPREHEN METABOLIC PANEL: CPT | Performed by: INTERNAL MEDICINE

## 2019-02-08 PROCEDURE — 82150 ASSAY OF AMYLASE: CPT | Performed by: INTERNAL MEDICINE

## 2019-02-08 PROCEDURE — 85007 BL SMEAR W/DIFF WBC COUNT: CPT | Performed by: INTERNAL MEDICINE

## 2019-02-08 PROCEDURE — 85027 COMPLETE CBC AUTOMATED: CPT | Performed by: INTERNAL MEDICINE

## 2019-02-08 PROCEDURE — 83690 ASSAY OF LIPASE: CPT | Performed by: INTERNAL MEDICINE

## 2019-02-08 PROCEDURE — 93010 ELECTROCARDIOGRAM REPORT: CPT | Performed by: INTERNAL MEDICINE

## 2019-02-08 RX ORDER — CHLORDIAZEPOXIDE HYDROCHLORIDE 25 MG/1
CAPSULE, GELATIN COATED ORAL
Qty: 12 CAPSULE | Refills: 0 | Status: SHIPPED | OUTPATIENT
Start: 2019-02-08 | End: 2019-02-14

## 2019-02-08 RX ORDER — OXYCODONE HYDROCHLORIDE 5 MG/1
5 TABLET ORAL EVERY 4 HOURS PRN
Qty: 20 TABLET | Refills: 0 | Status: SHIPPED | OUTPATIENT
Start: 2019-02-08 | End: 2019-02-14

## 2019-02-08 RX ORDER — POTASSIUM CHLORIDE 20 MEQ/1
40 TABLET, EXTENDED RELEASE ORAL ONCE
Status: COMPLETED | OUTPATIENT
Start: 2019-02-08 | End: 2019-02-08

## 2019-02-08 RX ADMIN — OXYCODONE HYDROCHLORIDE 10 MG: 5 TABLET ORAL at 08:35

## 2019-02-08 RX ADMIN — CHLORDIAZEPOXIDE HYDROCHLORIDE 25 MG: 25 CAPSULE ORAL at 05:01

## 2019-02-08 RX ADMIN — HEPARIN SODIUM 5000 UNITS: 5000 INJECTION INTRAVENOUS; SUBCUTANEOUS at 05:01

## 2019-02-08 RX ADMIN — POTASSIUM CHLORIDE 40 MEQ: 1500 TABLET, EXTENDED RELEASE ORAL at 11:06

## 2019-02-08 RX ADMIN — OXYCODONE HYDROCHLORIDE 10 MG: 5 TABLET ORAL at 00:04

## 2019-02-08 RX ADMIN — OXYCODONE HYDROCHLORIDE 10 MG: 5 TABLET ORAL at 04:37

## 2019-02-08 RX ADMIN — OXYCODONE HYDROCHLORIDE 10 MG: 5 TABLET ORAL at 12:38

## 2019-02-08 NOTE — PLAN OF CARE
DISCHARGE PLANNING     Discharge to home or other facility with appropriate resources Adequate for Discharge        DISCHARGE PLANNING - CARE MANAGEMENT     Discharge to post-acute care or home with appropriate resources Adequate for Discharge        GENITOURINARY - ADULT     Absence of urinary retention Adequate for Discharge        INFECTION - ADULT     Absence or prevention of progression during hospitalization Adequate for Discharge     Absence of fever/infection during neutropenic period Adequate for Discharge        Knowledge Deficit     Patient/family/caregiver demonstrates understanding of disease process, treatment plan, medications, and discharge instructions Adequate for Discharge        METABOLIC, FLUID AND ELECTROLYTES - ADULT     Electrolytes maintained within normal limits Adequate for Discharge     Fluid balance maintained Adequate for Discharge        NEUROSENSORY - ADULT     Achieves stable or improved neurological status Adequate for Discharge     Absence of seizures Adequate for Discharge        Nutrition/Hydration-ADULT     Nutrient/Hydration intake appropriate for improving, restoring or maintaining nutritional needs Adequate for Discharge        PAIN - ADULT     Verbalizes/displays adequate comfort level or baseline comfort level Adequate for Discharge        Potential for Falls     Patient will remain free of falls Adequate for Discharge        Prexisting or High Potential for Compromised Skin Integrity     Skin integrity is maintained or improved Adequate for Discharge        RESPIRATORY - ADULT     Achieves optimal ventilation and oxygenation Adequate for Discharge        SAFETY ADULT     Patient will remain free of falls Adequate for Discharge     Maintain or return to baseline ADL function Adequate for Discharge     Maintain or return mobility status to optimal level Adequate for Discharge

## 2019-02-08 NOTE — ASSESSMENT & PLAN NOTE
· Now off Precedex, not on CIWA protocol anymore   No withdrawal symptoms noted   · Librium taper at discharge   · Psychology referral   · HOST referral through case management - will discuss during interdisciplinary rounds

## 2019-02-08 NOTE — ASSESSMENT & PLAN NOTE
· Patient is alert and oriented x 3 today  Conversing well   No withdrawal symptoms   · Librium taper

## 2019-02-08 NOTE — PROGRESS NOTES
Pt observed to be sleeping for mod intervals overnight; states po pain meds effective for abd discomfort

## 2019-02-08 NOTE — PROGRESS NOTES
[facility] Room  [id number]    37 y o   female      ASSESSMENT and PLAN    Alcohol withdrawal delirium, acute, hyperactive ([address] )   Assessment & Plan    More awake  Resolved     Cholelithiasis   Assessment & Plan    Gallstones with a positive HIDA scan  Status post laparoscopic cholecystectomy, postop day 2  No findings of acute cholecystitis  IOC unable to be performed as cystic duct was to small to cannulate  LFTs essentially normalized with the exception of mildly elevated alk-phos  Plan: Will arrange for an outpatient MRCP   * Acute pancreatitis   Assessment & Plan    Pancreatitis probably secondary to EtOH    Although she has gallstones, the severity of her current EtOH withdraw points toward significance of her EtOH intake      PLAN - reinforced the importance of total abstinence from alcohol       Chief Complaint   Patient presents with    Abdominal Pain     Patient presents to the ER stating she started with RUQ abdominal pain yesterday, has history of gall stones       SUBJECTIVE/HPI   some postop incisional pain but otherwise without any GI complaints    /68 (BP Location: Right arm)   Pulse 92   Temp 97 8 °F (36 6 °C) (Oral)   Resp 17   Ht 5' 6" (1 676 m)   Wt (!) 145 kg (319 lb 10 7 oz)   LMP 01/16/2019   SpO2 93%   BMI 51 60 kg/m²     PHYSICALEXAM  General appearance: alert, appears stated age and cooperative  Head: Normocephalic, without obvious abnormality, atraumatic  Lungs: clear to auscultation bilaterally  Heart: regular rate and rhythm, S1, S2 normal, no murmur, click, rub or gallop  Abdomen: soft, non-tender; bowel sounds normal; no masses,  no organomegaly  Extremities: extremities normal, atraumatic, no cyanosis or edema  Neurologic: Grossly normal    Lab Results   Component Value Date    CALCIUM 8 3 02/08/2019    K 3 3 (L) 02/08/2019    CO2 25 02/08/2019     02/08/2019    BUN 4 (L) 02/08/2019    CREATININE 0 62 02/08/2019     Lab Results   Component Value Date WBC 8 48 02/08/2019    HGB 13 4 02/08/2019    HCT 42 2 02/08/2019     (H) 02/08/2019     02/08/2019     Lab Results   Component Value Date    ALT 22 02/08/2019    AST 28 02/08/2019    ALKPHOS 125 (H) 02/08/2019     Lab Results   Component Value Date    AMYLASE 84 02/08/2019     Lab Results   Component Value Date    LIPASE 580 (H) 02/08/2019     No results found for: IRON, TIBC, FERRITIN  No results found for: INR    Counseling / Coordination of Care  Total floor / unit time spent today 20 minutes

## 2019-02-08 NOTE — DISCHARGE SUMMARY
[address] Internal Medicine  Discharge- Sanjana Rising 1975, 37 y o  female MRN: [id number]    Unit/Bed#: [address] [place] [address] Encounter: [id number]    Primary Care Provider: Padmaja Mercado MD   Date and time admitted to hospital: 1/30/2019  8:29 AM        * Acute pancreatitis   Assessment & Plan    · POA, resolved  Patient tolerating diet  IOC unable to be performed   · Stable for discharge   · Appreciate GI input  · Outpatient MRCP as per their notes  · Stress absolute and total cessation from ETOH  · Low fat diet        Alcohol withdrawal delirium, acute, hyperactive ([facility] [address] )   Assessment & Plan    · Patient is alert and oriented x 3 today  Conversing well  No withdrawal symptoms   · Librium taper      Alcohol abuse   Assessment & Plan    · Now off Precedex, not on CIWA protocol anymore  No withdrawal symptoms noted   · Librium taper at discharge   · Psychology referral   · HOST referral through case management - will discuss during interdisciplinary rounds     Cholelithiasis   Assessment & Plan    · Status post Lap Vee without complications  Attempted IOC, but unable to cannulate  Patient has been passing gas and tolerating diet  Incisions are clean, dry, and intact  · Stable for discharge   · Outpatient follow up with general surgery - ambulatory referral placed        Morbid obesity ([facility] [address] )   Assessment & Plan    · BMI noted   · Therapeutic lifestyle modification discussed     Acute respiratory failure with hypoxemia (HCC)-resolved as of 2/8/2019   Assessment & Plan    · Resolved   · Encourage OOB, ambulation, and incentive spirometry at home      Hypokalemia   Assessment & Plan    · 3 3 today   · Give 40 mEq KCl prior to discharge   · Encourage PO intake          Discharging Physician / Practitioner: Damion Watts PA-C  PCP: Padmaja Mercado MD  Admission Date:   Admission Orders     Ordered        01/30/19 1048  Inpatient Admission (expected length of stay for this patient Order details is greater than two midnights)  Once             Discharge Date: 02/08/19    Resolved Problems  Date Reviewed: 2/8/2019          Resolved    Acute respiratory failure with hypoxemia ([address] ) 2/8/2019     Resolved by  Luis Moreno PA-C          Consultations During Hospital Stay:  · General Surgery - Dr Alexander Skill  · Gastroenterology -  Dr Diony Luther   · Anton Valles - Dr Navarro Base   · Psychiatry - Dr Eloisa Brunner    Procedures Performed:   · CT abdomen pelvis without contrast   · CXR  Portable   · US abdomen   · NM hepatobiliary   · Lap Vee     Significant Findings / Test Results:   · CXR Portable - Pulmonary vascular congestion  Right lower lobe subsegmental atelectasis   · CT Abdomen/Pelvis without contrast - Acute pancreatitis  No organized pancreatic or peripancreatic fluid collection  Cholelithiasis without evidence of cholecystitis  Stable hepatomegaly with steatosis   · US abdomen - Multiple small gallstones in the gallbladder, and mild gallbladder thickening at 3 mm  There is no gallbladder distention or pericholecystic fluid  Sonographic ray's sign cannot be accurately assessed because patient had recent pain administration, thus limiting ultrasound assessment of acute cholecystitis  · NM Hepatobiliary - Nonvisualization of the gallbladder on delayed imaging  Findings suspicious for cystic duct obstruction and acute cholecystitis  · Lap Vee - See op note for further details     Incidental Findings:   · As above     Test Results Pending at Discharge (will require follow up): · None     Outpatient Tests Requested:  · Follow up with PCP, Surgery, and Psychology   · HOST referral made     Complications:  None    Reason for Admission: Acute Pancreatitis, ETOH withdrawal    Hospital Course:     Cecily Randhawa is a 37 y o  female patient who originally presented to the hospital on 1/30/2019 due to abdominal pain   The patient presented to the ER after having a binge drinking episode and starting with abdominal pain that was consistent with prior attack of gallstone pancreatitis  On admission she underwent CT scan that was suggestive for pancreatitis  She was admitted, started on aggressive IV fluids, and a gastroenterology consultation was requested  She was provided with PRN pain control and antiemetics  Her lipase was trended and had normalized a few days into the hospital stay, but then elevated again  There was some suspicion for obstructive pathology as well so a HIDA scan was performed that was suggestive of cystic duct obstruction  For this a general surgery consultation was obtained and the patient underwent laparoscopic cholecystectomy successfully  Her diet was advanced after this and by the day of discharge she was able to to tolerate a diet  For her ETOH withdrawal she was started on Librium and provided with Ativan PRN as per the CIWA protocol  She had on going agitation and delirium despite aggressive measures so a critical care consult was obtained and Precedex drip was started and she was transferred to the ICU for closer monitoring earlier in her hospital stay  She was able to be transferred to [facility] prior to discharge  A psychiatry consult was obtained as well however no acute changes were made  Kindly refer to the medical chart for further details  Please see above list of diagnoses and related plan for additional information  Condition at Discharge: stable     Discharge Day Visit / Exam:     Subjective:  Patient reports that she has some abdominal pain, but states that it is tolerable with her pain medications  Denies fevers or chills  Denies BM, but has been passing gas and tolerating a diet without vomiting  Denies withdrawal symptoms  Emilie Loop to follow up with outpatient avenues to continue to avoid alcohol use     Vitals: Blood Pressure: 126/68 (02/08/19 0727)  Pulse: 92 (02/08/19 0727)  Temperature: 97 8 °F (36 6 °C) (02/08/19 0727)  Temp Source: Oral (02/08/19 0727)  Respirations: 17 (02/08/19 7226)  Height: 5' 6" (167 6 cm) (01/30/19 1135)  Weight - Scale: (!) 145 kg (319 lb 10 7 oz) (02/08/19 0727)  SpO2: 93 % (02/08/19 0727)  Exam:   Physical Exam   Constitutional: She is oriented to person, place, and time  Vital signs are normal  She appears well-developed and well-nourished  Non-toxic appearance  No distress  HENT:   Head: Normocephalic and atraumatic  Eyes: Pupils are equal, round, and reactive to light  Conjunctivae and EOM are normal    Neck: Neck supple  Cardiovascular: Normal rate, regular rhythm, S1 normal, S2 normal, normal heart sounds and intact distal pulses  Exam reveals no S3 and no S4  No murmur heard  Pulmonary/Chest: Effort normal and breath sounds normal  No accessory muscle usage  No respiratory distress  She has no decreased breath sounds  She has no wheezes  She has no rhonchi  She has no rales  She exhibits no tenderness  Abdominal: Soft  Bowel sounds are normal  She exhibits no distension and no mass  There is no tenderness  There is no rigidity, no rebound and no guarding  Neurological: She is alert and oriented to person, place, and time  She is not disoriented  She displays no tremor  She displays no seizure activity  GCS eye subscore is 4  GCS verbal subscore is 5  GCS motor subscore is 6  Skin: Skin is warm and dry  Psychiatric: She has a normal mood and affect  Her speech is normal        Discussion with Family: None    Discharge instructions/Information to patient and family:   See after visit summary for information provided to patient and family  Provisions for Follow-Up Care:  See after visit summary for information related to follow-up care and any pertinent home health orders  Disposition:     Home    For Discharges to   [address] SNF:   · Not Applicable to this Patient - Not Applicable to this Patient    Planned Readmission: None     Discharge Statement:  I spent 60 minutes discharging the patient   This time was spent on the day of discharge  I had direct contact with the patient on the day of discharge  Greater than 50% of the total time was spent examining patient, answering all patient questions, arranging and discussing plan of care with patient as well as directly providing post-discharge instructions  Additional time then spent on discharge activities  Discharge Medications:  See after visit summary for reconciled discharge medications provided to patient and family        ** Please Note: This note has been constructed using a voice recognition system **

## 2019-02-08 NOTE — ASSESSMENT & PLAN NOTE
· Status post Lap Vee without complications  Attempted IOC, but unable to cannulate  Patient has been passing gas and tolerating diet  Incisions are clean, dry, and intact  · Stable for discharge   · Outpatient follow up with general surgery - ambulatory referral placed

## 2019-02-08 NOTE — ASSESSMENT & PLAN NOTE
· POA, resolved  Patient tolerating diet   IOC unable to be performed   · Stable for discharge   · Appreciate GI input  · Outpatient MRCP as per their notes  · Stress absolute and total cessation from ETOH  · Low fat diet

## 2019-02-11 ENCOUNTER — TELEPHONE (OUTPATIENT)
Dept: SURGERY | Facility: HOSPITAL | Age: 44
End: 2019-02-11

## 2019-02-11 NOTE — TELEPHONE ENCOUNTER
Left message for patient  Asked her to call office to see how she is feeling after her surgery and to set up post op appointment

## 2019-02-14 ENCOUNTER — APPOINTMENT (EMERGENCY)
Dept: CT IMAGING | Facility: HOSPITAL | Age: 44
End: 2019-02-14
Payer: COMMERCIAL

## 2019-02-14 ENCOUNTER — HOSPITAL ENCOUNTER (EMERGENCY)
Facility: HOSPITAL | Age: 44
Discharge: HOME | End: 2019-02-14
Attending: EMERGENCY MEDICINE | Admitting: EMERGENCY MEDICINE
Payer: COMMERCIAL

## 2019-02-14 VITALS
TEMPERATURE: 97.5 F | HEIGHT: 66 IN | SYSTOLIC BLOOD PRESSURE: 106 MMHG | DIASTOLIC BLOOD PRESSURE: 57 MMHG | OXYGEN SATURATION: 94 % | HEART RATE: 76 BPM | BODY MASS INDEX: 47.09 KG/M2 | RESPIRATION RATE: 20 BRPM | WEIGHT: 293 LBS

## 2019-02-14 DIAGNOSIS — R10.11 RUQ ABDOMINAL PAIN: Primary | ICD-10-CM

## 2019-02-14 DIAGNOSIS — R74.8 ELEVATED LIPASE: ICD-10-CM

## 2019-02-14 LAB
ALBUMIN SERPL BCP-MCNC: 3 G/DL (ref 3.5–5)
ALP SERPL-CCNC: 89 U/L (ref 46–116)
ALT SERPL W P-5'-P-CCNC: 19 U/L (ref 12–78)
ANION GAP SERPL CALCULATED.3IONS-SCNC: 11 MMOL/L (ref 4–13)
AST SERPL W P-5'-P-CCNC: 48 U/L (ref 5–45)
BASOPHILS # BLD AUTO: 0.06 THOUSANDS/ΜL (ref 0–0.1)
BASOPHILS NFR BLD AUTO: 1 % (ref 0–1)
BILIRUB SERPL-MCNC: 1.1 MG/DL (ref 0.2–1)
BUN SERPL-MCNC: 8 MG/DL (ref 5–25)
CALCIUM SERPL-MCNC: 8.7 MG/DL (ref 8.3–10.1)
CHLORIDE SERPL-SCNC: 101 MMOL/L (ref 100–108)
CLARITY, POC: CLEAR
CO2 SERPL-SCNC: 22 MMOL/L (ref 21–32)
COLOR, POC: NORMAL
CREAT SERPL-MCNC: 0.69 MG/DL (ref 0.6–1.3)
EOSINOPHIL # BLD AUTO: 0.24 THOUSAND/ΜL (ref 0–0.61)
EOSINOPHIL NFR BLD AUTO: 3 % (ref 0–6)
ERYTHROCYTE [DISTWIDTH] IN BLOOD BY AUTOMATED COUNT: 12.1 % (ref 11.6–15.1)
EXT BILIRUBIN, UA: NORMAL
EXT BLOOD URINE: NORMAL
EXT GLUCOSE, UA: NORMAL
EXT KETONES: NORMAL
EXT NITRITE, UA: NORMAL
EXT PH, UA: 6
EXT PROTEIN, UA: NORMAL
EXT SPECIFIC GRAVITY, UA: 1.03
EXT UROBILINOGEN: 0.2
GFR SERPL CREATININE-BSD FRML MDRD: 107 ML/MIN/1.73SQ M
GLUCOSE SERPL-MCNC: 88 MG/DL (ref 65–140)
HCT VFR BLD AUTO: 44.4 % (ref 34.8–46.1)
HGB BLD-MCNC: 14 G/DL (ref 11.5–15.4)
IMM GRANULOCYTES # BLD AUTO: 0.11 THOUSAND/UL (ref 0–0.2)
IMM GRANULOCYTES NFR BLD AUTO: 1 % (ref 0–2)
LIPASE SERPL-CCNC: 688 U/L (ref 73–393)
LYMPHOCYTES # BLD AUTO: 1.73 THOUSANDS/ΜL (ref 0.6–4.47)
LYMPHOCYTES NFR BLD AUTO: 21 % (ref 14–44)
MCH RBC QN AUTO: 33.5 PG (ref 26.8–34.3)
MCHC RBC AUTO-ENTMCNC: 31.5 G/DL (ref 31.4–37.4)
MCV RBC AUTO: 106 FL (ref 82–98)
MONOCYTES # BLD AUTO: 0.45 THOUSAND/ΜL (ref 0.17–1.22)
MONOCYTES NFR BLD AUTO: 6 % (ref 4–12)
NEUTROPHILS # BLD AUTO: 5.51 THOUSANDS/ΜL (ref 1.85–7.62)
NEUTS SEG NFR BLD AUTO: 68 % (ref 43–75)
NRBC BLD AUTO-RTO: 0 /100 WBCS
PLATELET # BLD AUTO: 302 THOUSANDS/UL (ref 149–390)
PMV BLD AUTO: 11.1 FL (ref 8.9–12.7)
POTASSIUM SERPL-SCNC: 5 MMOL/L (ref 3.5–5.3)
PROT SERPL-MCNC: 7 G/DL (ref 6.4–8.2)
RBC # BLD AUTO: 4.18 MILLION/UL (ref 3.81–5.12)
SODIUM SERPL-SCNC: 134 MMOL/L (ref 136–145)
WBC # BLD AUTO: 8.1 THOUSAND/UL (ref 4.31–10.16)
WBC # BLD EST: NORMAL 10*3/UL

## 2019-02-14 PROCEDURE — 85025 COMPLETE CBC W/AUTO DIFF WBC: CPT | Performed by: PHYSICIAN ASSISTANT

## 2019-02-14 PROCEDURE — 36415 COLL VENOUS BLD VENIPUNCTURE: CPT | Performed by: PHYSICIAN ASSISTANT

## 2019-02-14 PROCEDURE — 74177 CT ABD & PELVIS W/CONTRAST: CPT

## 2019-02-14 PROCEDURE — 80053 COMPREHEN METABOLIC PANEL: CPT | Performed by: PHYSICIAN ASSISTANT

## 2019-02-14 PROCEDURE — 99284 EMERGENCY DEPT VISIT MOD MDM: CPT

## 2019-02-14 PROCEDURE — 96374 THER/PROPH/DIAG INJ IV PUSH: CPT

## 2019-02-14 PROCEDURE — 81002 URINALYSIS NONAUTO W/O SCOPE: CPT | Performed by: PHYSICIAN ASSISTANT

## 2019-02-14 PROCEDURE — 83690 ASSAY OF LIPASE: CPT | Performed by: PHYSICIAN ASSISTANT

## 2019-02-14 RX ORDER — TRAMADOL HYDROCHLORIDE 50 MG/1
50 TABLET ORAL EVERY 6 HOURS PRN
Qty: 15 TABLET | Refills: 0 | Status: SHIPPED | OUTPATIENT
Start: 2019-02-14 | End: 2021-08-07

## 2019-02-14 RX ORDER — KETOROLAC TROMETHAMINE 30 MG/ML
30 INJECTION, SOLUTION INTRAMUSCULAR; INTRAVENOUS ONCE
Status: COMPLETED | OUTPATIENT
Start: 2019-02-14 | End: 2019-02-14

## 2019-02-14 RX ADMIN — KETOROLAC TROMETHAMINE 30 MG: 30 INJECTION, SOLUTION INTRAMUSCULAR; INTRAVENOUS at 15:32

## 2019-02-14 RX ADMIN — IOHEXOL 100 ML: 350 INJECTION, SOLUTION INTRAVENOUS at 15:13

## 2019-02-14 NOTE — DISCHARGE INSTRUCTIONS
Rest, increase fluids  Clear liquid diet for next 24 hours, then slowly advance foods  Follow up with GI doctor in 2-3 days for recheck  Return to ER if symptoms worsen  Ultram as needed for pain

## 2019-02-14 NOTE — ED NOTES
Patient poor vascular access, another RN to attempt IV insertion     Yessica Estevez, JUAN CARLOS  02/14/19 9239

## 2019-02-14 NOTE — ED PROVIDER NOTES
History  Chief Complaint   Patient presents with    Abdominal Pain     Patient presents to ED with increase in RUQ, diarrhea that began on 1/9  Pt was recently d/c from hospital for pancreatitis, she went for repeat bloodwork yesterday results came back today for lipase of 730     38 yo female patient with PMH of pancreatitis presents with abdominal pain and diarrhea s/p cholecystectomy 02/06/19  Surgery was without complication however patient's hospital stay was complicated with UTI and alcohol withdrawal necessitating ICU management  Patient reports having multiple episodes of diarrhea since being discharged on 2/8, reporting the diarrhea is "dark dark brown" but free of fallon blood  Patient reports it is very watery, sounding like "a faucet"  Patient reports minimal flatulence since being discharged  Patient also reports RUQ pain that feels sharp, and worsens with deep inspiration  No positional changes aggravate or alleviate the pain  Patient reports this pain episode feels similar to prior episodes of pancreatitis  Patient had fasting labs collected yesterday, which resulted today revealing an elevated lipase of 730  Patient reports dizziness, but denies fevers, chills, headaches, chest pain, SOB, LE swelling/pain    SHe states she has not had alcohol since being discharged from the hospital        History provided by:  Patient  Abdominal Pain   Pain location:  RUQ  Pain quality: sharp and stabbing    Pain radiates to:  Does not radiate  Pain severity:  Moderate (5/10 sitting still, 7/10 with deep inspiration )  Onset quality:  Gradual  Timing:  Constant  Progression:  Worsening  Chronicity:  Recurrent  Context: previous surgery    Context: not sick contacts    Relieved by:  Nothing  Worsened by:  Eating  Associated symptoms: diarrhea and nausea    Associated symptoms: no anorexia, no chest pain, no chills, no constipation, no cough, no fever, no shortness of breath and no vomiting    Risk factors: multiple surgeries, obesity and recent hospitalization    Risk factors: not pregnant        Prior to Admission Medications   Prescriptions Last Dose Informant Patient Reported? Taking? Sertraline HCl (ZOLOFT PO)   Yes Yes   Sig: Take by mouth      Facility-Administered Medications: None       Past Medical History:   Diagnosis Date    Asthma     Pancreatitis        Past Surgical History:   Procedure Laterality Date    CHOLECYSTECTOMY LAPAROSCOPIC N/A 2/6/2019    Procedure: CHOLECYSTECTOMY LAPAROSCOPIC with ioc;  Surgeon: Cony Campuzano MD;  Location:  MAIN OR;  Service: General    GASTRIC BYPASS         Family History   Problem Relation Age of Onset    Alcohol abuse Maternal Grandfather      I have reviewed and agree with the history as documented  Social History     Tobacco Use    Smoking status: Never Smoker    Smokeless tobacco: Never Used   Substance Use Topics    Alcohol use: Not Currently     Alcohol/week: 3 0 oz     Types: 5 Shots of liquor per week     Comment: past hx of alcohol abuse  Last drink 1/27    Drug use: No        Review of Systems   Constitutional: Negative for chills and fever  HENT: Negative  Respiratory: Negative for cough and shortness of breath  Cardiovascular: Negative for chest pain  Gastrointestinal: Positive for abdominal pain, diarrhea and nausea  Negative for anorexia, blood in stool, constipation and vomiting  Musculoskeletal: Negative for back pain and neck pain  Skin: Negative for color change and rash  Neurological: Negative for dizziness, weakness and numbness  Psychiatric/Behavioral: Negative for confusion  All other systems reviewed and are negative  Physical Exam  Physical Exam   Constitutional: She is oriented to person, place, and time  She appears well-developed and well-nourished  She is cooperative  She does not appear ill  No distress  HENT:   Head: Normocephalic and atraumatic     Nose: Nose normal    Mouth/Throat: Oropharynx is clear and moist    Eyes: Conjunctivae are normal    Neck: Normal range of motion  Cardiovascular: Normal rate, regular rhythm and normal heart sounds  Pulmonary/Chest: Effort normal and breath sounds normal    Abdominal: Soft  Normal appearance and bowel sounds are normal  There is tenderness in the right upper quadrant and epigastric area  There is no rigidity, no rebound and no guarding  Musculoskeletal: Normal range of motion  She exhibits no edema or deformity  Neurological: She is alert and oriented to person, place, and time  She has normal strength  No sensory deficit  Skin: Skin is warm and dry  No rash noted  She is not diaphoretic  No pallor  Psychiatric: She has a normal mood and affect  Nursing note and vitals reviewed        Vital Signs  ED Triage Vitals   Temperature Pulse Respirations Blood Pressure SpO2   02/14/19 1320 02/14/19 1345 02/14/19 1320 02/14/19 1320 02/14/19 1320   97 5 °F (36 4 °C) 80 20 123/83 98 %      Temp Source Heart Rate Source Patient Position - Orthostatic VS BP Location FiO2 (%)   02/14/19 1320 02/14/19 1320 02/14/19 1320 02/14/19 1320 --   Tympanic Monitor Lying Right arm       Pain Score       02/14/19 1320       6           Vitals:    02/14/19 1600 02/14/19 1615 02/14/19 1630 02/14/19 1645   BP: 143/92 130/71  106/57   Pulse: 76 78 76 76   Patient Position - Orthostatic VS:           Visual Acuity      ED Medications  Medications   ketorolac (TORADOL) injection 30 mg (30 mg Intravenous Given 2/14/19 1532)   iohexol (OMNIPAQUE) 350 MG/ML injection (MULTI-DOSE) 100 mL (100 mL Intravenous Given 2/14/19 1513)       Diagnostic Studies  Results Reviewed     Procedure Component Value Units Date/Time    POCT urinalysis dipstick [[id number]]  (Normal) Resulted:  02/14/19 1453    Lab Status:  Final result Specimen:  Urine Updated:  02/14/19 1511     Color, UA yello     Clarity, UA clear     Glucose, UA (Ref: Negative) neg     Bilirubin, UA (Ref: Negative) neg     Ketones, UA (Ref: Negative) neg     Spec Grav, UA (Ref:1 003-1 030) 1 030     Blood, UA (Ref: Negative) neg     pH, UA (Ref: 4 5-8 0) 6 0     Protein, UA (Ref: Negative) neg     Urobilinogen, UA (Ref: 0 2- 1 0) 0 2      Leukocytes, UA (Ref: Negative) neg     Nitrite, UA (Ref: Negative) neg    CBC and differential [[id number]]  (Abnormal) Collected:  02/14/19 1439    Lab Status:  Final result Specimen:  Blood from Arm, Right Updated:  02/14/19 1501     WBC 8 10 Thousand/uL      RBC 4 18 Million/uL      Hemoglobin 14 0 g/dL      Hematocrit 44 4 %       fL      MCH 33 5 pg      MCHC 31 5 g/dL      RDW 12 1 %      MPV 11 1 fL      Platelets 440 Thousands/uL      nRBC 0 /100 WBCs      Neutrophils Relative 68 %      Immat GRANS % 1 %      Lymphocytes Relative 21 %      Monocytes Relative 6 %      Eosinophils Relative 3 %      Basophils Relative 1 %      Neutrophils Absolute 5 51 Thousands/µL      Immature Grans Absolute 0 11 Thousand/uL      Lymphocytes Absolute 1 73 Thousands/µL      Monocytes Absolute 0 45 Thousand/µL      Eosinophils Absolute 0 24 Thousand/µL      Basophils Absolute 0 06 Thousands/µL     Comprehensive metabolic panel [[id number]]  (Abnormal) Collected:  02/14/19 1357    Lab Status:  Final result Specimen:  Blood from Arm, Right Updated:  02/14/19 1453     Sodium 134 mmol/L      Potassium 5 0 mmol/L      Chloride 101 mmol/L      CO2 22 mmol/L      ANION GAP 11 mmol/L      BUN 8 mg/dL      Creatinine 0 69 mg/dL      Glucose 88 mg/dL      Calcium 8 7 mg/dL      AST 48 U/L      ALT 19 U/L      Alkaline Phosphatase 89 U/L      Total Protein 7 0 g/dL      Albumin 3 0 g/dL      Total Bilirubin 1 10 mg/dL      eGFR 107 ml/min/1 73sq m     Narrative:       National Kidney Disease Education Program recommendations are as follows:  GFR calculation is accurate only with a steady state creatinine  Chronic Kidney disease less than 60 ml/min/1 73 sq  meters  Kidney failure less than 15 ml/min/1 73 sq  meters      Lipase [[id number]]  (Abnormal) Collected:  02/14/19 1357    Lab Status:  Final result Specimen:  Blood from Arm, Right Updated:  02/14/19 1453     Lipase 688 u/L                  CT abdomen pelvis with contrast   Final Result by Rommel Barba MD (02/14 9027)      Developing pseudocysts in the pancreatic head and uncinate process  Improved but not entirely resolved changes of interstitial edematous pancreatitis  Hepatomegaly and hepatic steatosis  Postsurgical changes of cholecystectomy without evidence of complication  Expected postsurgical changes of remote prior Adilson-en-Y gastric bypass  Unchanged large fat-containing ventral hernia  The study was marked in [facility] for immediate notification  Workstation performed: [id number]                    Procedures  Procedures       Phone Contacts  ED Phone Contact    ED Course                               MDM  Number of Diagnoses or Management Options  Elevated lipase: established and worsening  RUQ abdominal pain: new and requires workup  Diagnosis management comments: Patient with elevated lipase, will check labs and order CT abd and pelvis since she has epigastric pain to r/o pancreatitis  Patient's lipase improving, CT scan shows improvement of pancreatitis    Advised f/u with GI doctor for pancreatic pseudocyst           Amount and/or Complexity of Data Reviewed  Clinical lab tests: ordered and reviewed  Tests in the radiology section of CPT®: ordered and reviewed    Patient Progress  Patient progress: stable      Disposition  Final diagnoses:   RUQ abdominal pain   Elevated lipase     Time reflects when diagnosis was documented in both MDM as applicable and the Disposition within this note     Time User Action Codes Description Comment    2/14/2019  4:20 PM Mara Mora [R10 11] RUQ abdominal pain     2/14/2019  4:21 PM Joseph Solano [R74 8] Elevated lipase       ED Disposition     ED Disposition Condition Date/Time Comment Discharge Stable Thu Feb 14, 2019  4:21 PM Antwan Coburn discharge to home/self care  Follow-up Information     Follow up With Specialties Details Why Contact Info    Alina Richey MD Gastroenterology Schedule an appointment as soon as possible for a visit in 1 day  [address] [address] [phone]            Discharge Medication List as of 2/14/2019  4:24 PM      START taking these medications    Details   traMADol (ULTRAM) 50 mg tablet Take 1 tablet (50 mg total) by mouth every 6 (six) hours as needed for moderate pain, Starting Thu 2/14/2019, Normal         CONTINUE these medications which have NOT CHANGED    Details   Sertraline HCl (ZOLOFT PO) Take by mouth, Historical Med           No discharge procedures on file      ED Provider  Electronically Signed by           Rhoda Dacosta PA-C  02/14/19 1918

## 2019-02-14 NOTE — ED NOTES
Patient complaining of diarrhea, dizziness, increased RUQ pain since being d/c from this hospital in January  Patient was admitted for pancreatitis, completed treatment and has denied having any alcoholic drinks since 0/85/5292  She also reports having elevated lipase at 730  During PA student Enma's exam, patient also stated she has had back pain with deep breaths since being discharged       James Emery RN  02/14/19 4586

## 2019-02-21 ENCOUNTER — OFFICE VISIT (OUTPATIENT)
Dept: GASTROENTEROLOGY | Facility: CLINIC | Age: 44
End: 2019-02-21
Payer: COMMERCIAL

## 2019-02-21 VITALS
SYSTOLIC BLOOD PRESSURE: 134 MMHG | WEIGHT: 293 LBS | HEART RATE: 94 BPM | HEIGHT: 65 IN | DIASTOLIC BLOOD PRESSURE: 80 MMHG | BODY MASS INDEX: 48.82 KG/M2

## 2019-02-21 DIAGNOSIS — K90.9 DIARRHEA DUE TO MALABSORPTION: ICD-10-CM

## 2019-02-21 DIAGNOSIS — K85.20 ALCOHOL-INDUCED ACUTE PANCREATITIS WITHOUT INFECTION OR NECROSIS: Primary | ICD-10-CM

## 2019-02-21 DIAGNOSIS — Z12.11 COLON CANCER SCREENING: ICD-10-CM

## 2019-02-21 DIAGNOSIS — F10.10 ALCOHOL ABUSE: ICD-10-CM

## 2019-02-21 DIAGNOSIS — K80.20 CALCULUS OF GALLBLADDER WITHOUT CHOLECYSTITIS WITHOUT OBSTRUCTION: ICD-10-CM

## 2019-02-21 DIAGNOSIS — R19.7 DIARRHEA DUE TO MALABSORPTION: ICD-10-CM

## 2019-02-21 PROCEDURE — 99214 OFFICE O/P EST MOD 30 MIN: CPT | Performed by: INTERNAL MEDICINE

## 2019-02-21 RX ORDER — CHOLESTYRAMINE 4 G/9G
4 POWDER, FOR SUSPENSION ORAL
Qty: 378 G | Refills: 3 | Status: SHIPPED | OUTPATIENT
Start: 2019-02-21 | End: 2019-02-22

## 2019-02-21 NOTE — PATIENT INSTRUCTIONS
Pancreatitis   WHAT YOU NEED TO KNOW:   Pancreatitis is inflammation of your pancreas  The pancreas is an organ that makes insulin  It also makes enzymes (digestive juices) that help your body digest food  Pancreatitis may be an acute (short-term) problem that happens only once  It may become a chronic (long-term) problem that comes and goes over time  DISCHARGE INSTRUCTIONS:   Return to the emergency department if:   · You have severe pain in your abdomen and you are vomiting  Contact your healthcare provider if:   · You have a fever  · You continue to lose weight  · Your skin or the whites of your eyes turn yellow  · You have questions or concerns about your condition or care  Medicines: You may need any of the following:  · Antibiotics  treat a bacterial infection  · Prescription pain medicine  may be given  Ask your healthcare provider how to take this medicine safely  Some prescription pain medicines contain acetaminophen  Do not take other medicines that contain acetaminophen without talking to your healthcare provider  Too much acetaminophen may cause liver damage  Prescription pain medicine may cause constipation  Ask your healthcare provider how to prevent or treat constipation  · Take your medicine as directed  Contact your healthcare provider if you think your medicine is not helping or if you have side effects  Tell him or her if you are allergic to any medicine  Keep a list of the medicines, vitamins, and herbs you take  Include the amounts, and when and why you take them  Bring the list or the pill bottles to follow-up visits  Carry your medicine list with you in case of an emergency  Self-care:   · Rest  when you feel it is needed  Slowly start to do more each day  Return to your usual activities as directed  · Do not drink any alcohol    If you need help to stop drinking, contact the following organization:   ¨ Alcoholics Anonymous  Web Address: http://www rudolph info/      · Ask your healthcare provider or dietitian about the best foods to eat  You may need to eat foods that are low in fat if you have chronic pancreatitis  Follow up with your healthcare provider as directed:  Write down your questions so you remember to ask them during your visits  © 2017 [address] [employer] Information is for End User's use only and may not be sold, redistributed or otherwise used for commercial purposes  All illustrations and images included in CareNotes® are the copyrighted property of [employer]  or Marko Castellanos  The above information is an  only  It is not intended as medical advice for individual conditions or treatments  Talk to your doctor, nurse or pharmacist before following any medical regimen to see if it is safe and effective for you  Lactose-Controlled Diet   WHAT YOU NEED TO KNOW:   A lactose-controlled diet includes foods that contain either small amounts of lactose (low lactose) or no lactose at all (lactose free)  Lactose is a sugar found in dairy foods, such as milk, cheese, and yogurt  You may need to follow this diet if you have gas, bloating, cramping, or diarrhea after you eat these foods  These symptoms occur when your body does not produce enough lactase  Lactase is the enzyme that helps your body digest lactose  This condition is called lactose intolerance  You may be able to follow a low-lactose diet if you are able to eat some dairy foods  If you cannot tolerate any dairy foods, you will need to follow a lactose-free diet  DISCHARGE INSTRUCTIONS:   How to find the amount of lactose you can tolerate: Work with your dietitian to find the amount of lactose you can have each day  You may be able to find this amount by trying small amounts of lactose at a time  · Try a food or drink that contains a low amount of lactose, such as reduced-lactose milk      · Eat or drink dairy foods that are easier to digest, such as yogurt and buttermilk  · Try only a small amount of dairy at a time, such as ¼ cup of milk or ½ ounce of cheese  · Only eat or drink 1 dairy food each day, and then slowly increase the amount you eat each day  · Only eat or drink 1 dairy food at a meal     · Have dairy foods or drinks with a meal or snack instead of eating or drinking them alone  · Try taking the lactase enzyme in pill or liquid form before you eat or drink dairy foods  This enzyme may help to prevent symptoms when you eat food with lactose  Foods to limit or avoid:  Limit or avoid milk (regular, condensed, powdered), yogurt, cheese, ice cream, and other dairy foods  Always read the ingredient labels before you buy any packaged foods  Limit or avoid foods that contain milk, milk solids, butter, buttermilk, cream, and whey  Even foods like margarine, nondairy creamer, baked goods, and salad dressings may contain some lactose  Instant soup or potatoes, beverage mixes, and pancake or cake mixes may also contain some lactose  Foods to include:   · Lactose-free foods:      ¨ Lactose-free, almond, rice, or soy milk    ¨ Soy yogurt or soy cheese    ¨ [place] milk cheese    ¨ Soy-based sour cream    ¨ Foods that contain casein, lactate, lactic acid, and lactalbumin (these come from milk, but do not contain lactose)    · Low-lactose foods:      ¨ Reduced-lactose milk    ¨ Aged cheese, such as Swiss, cheddar, or parmesan    ¨ Cream cheese    ¨ Cottage or ricotta cheese    ¨ Nondairy creamers    ¨ Nondairy whipped topping  Other guidelines:   · Use nondairy foods as substitutes for dairy foods in recipes  Replace 1 cup of whole milk with ½ cup soy or rice milk and ½ cup water  Replace regular yogurt or cheese with soy yogurt or cheese  · Your body needs calcium for strong bones and teeth and other important functions  Get the calcium you need from nondairy foods, such as lactose-free milk  It contains as much calcium as regular milk  Calcium is found in vegetables, such as turnip greens, collards, kale, and broccoli  It is also found in sardines, canned salmon, shellfish, almonds, and dried beans  Many foods and drinks have added calcium, such as tofu, orange juice, and soy milk  You can also take calcium as a supplement  Ask your dietitian for more information about how to get enough calcium  Contact your healthcare provider if:   · Your symptoms get worse  · You have questions or concerns about your condition or care  © 2017 [facility] Information is for End User's use only and may not be sold, redistributed or otherwise used for commercial purposes  All illustrations and images included in CareNotes® are the copyrighted property of [employer]  or Marko Castellanos  The above information is an  only  It is not intended as medical advice for individual conditions or treatments  Talk to your doctor, nurse or pharmacist before following any medical regimen to see if it is safe and effective for you

## 2019-02-21 NOTE — TELEPHONE ENCOUNTER
SAMPSON mssg from Ed at [facility] in [employer] stating rec'd script from Dr Lex Rosales for Cholestyramine packets  Packets are backordered by the ; req CB to [phone] to advise if OK to substitute can and use one scoop?

## 2019-02-21 NOTE — PROGRESS NOTES
[facility] - Outpatient Follow-up Note  Wayne Room 37 y o  female MRN: [id number]  Encounter: [id number]      ASSESSMENT AND PLAN:      1  Alcohol-induced acute pancreatitis without infection or necrosis  60-year-old female recently admitted for her 2nd episode alcoholic pancreatitis  CT last week showing formation of pseudocyst   Still with some abdominal pains but improved  Will follow up repeat blood work today  Emphasize importance of strict alcohol cessation  - cholestyramine (QUESTRAN) 4 GM/DOSE powder; Take 1 packet (4 g total) by mouth 3 (three) times a day with meals  Dispense: 378 g; Refill: 3  - Comprehensive metabolic panel; Future  - Lipase; Future  - Amylase; Future  - CBC and differential; Future  - Comprehensive metabolic panel  - Lipase  - Amylase  - CBC and differential  - Ambulatory referral to Nutrition Services; Future    2  Calculus of gallbladder without cholecystitis without obstruction  Stones noted on imaging  Status post laparoscopic cholecystectomy  3  Alcohol abuse  Heavy alcohol abuse in the past   Has quit since been discharged on February 8, 2019  Emphasized importance of strict alcohol cessation   and mother also here today and agrees to help her stay away from alcohol  4  Diarrhea due to malabsorption  History of Adilson-en-Y gastric bypass and now diarrhea worsening after the gallbladder removal   Likely secondary to bowel malabsorption  Recommended a nutrition evaluation to help her decide on the better diet regimen  In the meantime, will try some cholestyramine  If the diarrhea is due to her pancreas getting irritated, may need to add pancreatic enzymes  - cholestyramine (QUESTRAN) 4 GM/DOSE powder; Take 1 packet (4 g total) by mouth 3 (three) times a day with meals  Dispense: 378 g; Refill: 3  - Ambulatory referral to Nutrition Services; Future    5  Colon cancer screening  Average risk  Can start at age 48  Followup Appointment[de-identified]  4 weeks  ______________________________________________________________________    Chief Complaint   Patient presents with   [address] follow up       HPI:  This is a 42-year-old female who we saw in the hospital back in September and again 3 weeks ago for acute alcoholic pancreatitis  During the hospitalization this month, she also went into alcohol withdrawal with severe withdrawal delirium  She has stopped drinking alcohol since discharge for the past 10 days  Her pancreatitis eventually improved  She did have some gallstones and had her gallbladder taken out during the hospitalization  Since being discharged, she still has intermittent pains in the epigastric region  No significant nausea or vomiting  No GI bleeding  A lot of diarrhea and loose stools which worsened after the cholecystectomy  She has baseline loose stools because of her history of Adilson-en-Y surgery  The pain got so bad about a week ago and she went to the ER  Lipase was still elevated and CT showed development of pseudocyst   No actual necrosis or infection noted  No fevers or chills  She is on clear liquids at this point but started to at back some foods  Historical Information   Past Medical History:   Diagnosis Date    Asthma     Pancreatitis      Past Surgical History:   Procedure Laterality Date    CHOLECYSTECTOMY LAPAROSCOPIC N/A 2/6/2019    Procedure: CHOLECYSTECTOMY LAPAROSCOPIC with ioc;  Surgeon: Kuldeep Hayes MD;  Location: [facility];  Service: General    GASTRIC BYPASS       Social History   Social History     Substance and Sexual Activity   Alcohol Use Not Currently    Alcohol/week: 3 0 oz    Types: 5 Shots of liquor per week    Comment: past hx of alcohol abuse  Last drink 1/27      Was a very heavy drinker but quit since the last hospitalization February 2019        Social History     Substance and Sexual Activity   Drug Use No     Social History     Tobacco Use Smoking Status Never Smoker   Smokeless Tobacco Never Used     Family History   Problem Relation Age of Onset    Alcohol abuse Maternal Grandfather     Colon polyps Neg Hx     Colon cancer Neg Hx        Meds/Allergies       Current Outpatient Medications:     Sertraline HCl (ZOLOFT PO)    cholestyramine (QUESTRAN) 4 GM/DOSE powder    traMADol (ULTRAM) 50 mg tablet    Allergies   Allergen Reactions    Ciprofloxacin     Latex     Morphine Hallucinations    Tomato      Tongue irritation    Augmentin [Amoxicillin-Pot Clavulanate] Rash    Doxycycline Rash    Penicillins Rash       10 Point REVIEW OF SYSTEMS IS OTHERWISE NEGATIVE  Objective     Blood pressure 134/80, pulse 94, height 5' 5" (1 651 m), weight (!) 140 kg (309 lb)  Body mass index is 51 42 kg/m²  PHYSICAL EXAM:    General Appearance:  Alert, cooperative, no distress  Morbid obesity  HEENT:  Normocephalic, atraumatic, anicteric  Neck: Supple, symmetrical, trachea midline  Lungs: Clear to auscultation bilaterally; no rales, rhonchi or wheezing; respirations unlabored   Heart: Regular rate and rhythm; no murmur, rub, or gallop    Abdomen:   Soft, tender to palpation in the right upper quadrant epigastric region, non-distended; normal bowel sounds; no masses, no organomegaly   Rectal:  Deferred   Extremities:  No cyanosis, clubbing or edema   Skin:  No jaundice, rashes, or lesions   Lymph nodes: No palpable cervical lymphadenopathy     Lab Results:   Lab Results   Component Value Date    WBC 8 10 02/14/2019    HGB 14 0 02/14/2019    HCT 44 4 02/14/2019     (H) 02/14/2019     02/14/2019     Lab Results   Component Value Date    K 5 0 02/14/2019     02/14/2019    CO2 22 02/14/2019    BUN 8 02/14/2019    CREATININE 0 69 02/14/2019    CALCIUM 8 7 02/14/2019    AST 48 (H) 02/14/2019    ALT 19 02/14/2019    ALKPHOS 89 02/14/2019    EGFR 107 02/14/2019     No results found for: IRON, TIBC, FERRITIN  Lab Results   Component Value Date    LIPASE 688 (H) 02/14/2019         Radiology Results:   Us Abdomen Complete    Result Date: 2/4/2019  Narrative: ABDOMEN ULTRASOUND, COMPLETE INDICATION:   RUQ PAIN, NO FEVER, NO ELEVATED WBC R/O cholecystitis  COMPARISON: Abdomen ultrasound from 9/26/2018, and abdomen pelvic CT from 1/30/2019  TECHNIQUE:   Real-time ultrasound of the abdomen was performed with a curvilinear transducer with both volumetric sweeps and still imaging techniques  FINDINGS: PANCREAS:  Visualized portions of the pancreas are within normal limits  AORTA AND IVC:  Visualized portions are normal for patient age  LIVER: Size:  Mildly enlarged  The liver measures 18 2 cm in the midclavicular line  Contour:  Surface contour is smooth  Parenchyma:  Echogenicity and echotexture are within normal limits  No evidence of suspicious mass  Limited imaging of the main portal vein shows it to be patent and hepatopetal  BILIARY: The gallbladder is normal in caliber  Gallbladder wall mildly thickened at 3 mm  No pericholecystic fluid  Multiple small gallstones in the gallbladder  Sonographic Cee's sign cannot be accurately assessed because patient had recent pain medication administration, thus limiting ultrasound assessment of acute cholecystitis  No intrahepatic biliary dilatation  CBD measures 5 mm  No choledocholithiasis  KIDNEY: Right kidney measures 11 9 cm  Within normal limits  Left kidney measures 12 9 cm  Within normal limits  SPLEEN: Measures 12 7 x 11 6 x 6 4 cm  Within normal limits  ASCITES:  None  Impression: Multiple small gallstones in the gallbladder, and mild gallbladder thickening at 3 mm  There is no gallbladder distention or pericholecystic fluid  Sonographic Cee's sign cannot be accurately assessed because patient had recent pain medication administration, thus limiting ultrasound assessment of acute cholecystitis   Workstation performed: [id number]     Nm Hepatobiliary W Rx    Result Date: 2/5/2019  Narrative: HEPATOBILIARY SCAN WITH CHOLECYSTOKININ ADMINISTRATION INDICATION:  Cholelithiasis  K81 9: Cholecystitis, unspecified Z87 19: Personal history of other diseases of the digestive system COMPARISON:  Ultrasound 2/4/2019, CT 1/30/2019 TECHNIQUE: Patient was pretreated with 2 9 mcg CCK intravenously prior to the start of the study as per protocol  Following the intravenous administration of 5 2 mCi Tc-99m labeled mebrofenin, dynamic abdominal images were obtained over a 60 minute time  period  Images were performed in AP projection  FINDINGS: There is prompt, uniform accumulation with normal clearance of the radiopharmaceutical by the liver  Prompt excretion is noted into the biliary ducts and small bowel  Gallbladder is not visualized within the 1st hour  Additional 1 mCi of technetium 99m mebrofenin was intravenously administered at 2 hours after initial injection  Additional delayed static images acquired at 2 hours and 4 hours  The gallbladder is not seen on delayed imaging  Impression: 1  Nonvisualization of the gallbladder on delayed imaging  Findings suspicious for cystic duct obstruction and acute cholecystitis  The study was marked in [facility] for immediate notification  Workstation performed: [id number]     Xr Chest Portable Icu    Result Date: 2/2/2019  Narrative: CHEST INDICATION:   tachypnea  COMPARISON:  None EXAM PERFORMED/VIEWS:  XR CHEST PORTABLE ICU FINDINGS: Heart top normal in size  Pulmonary vessels prominent and indistinct  Right lower lobe subsegmental atelectasis  Lungs and pleural spaces otherwise clear  No evidence of pneumothorax  Osseous structures appear within normal limits for patient age  Impression: 1  Pulmonary vascular congestion  2   Right lower lobe subsegmental atelectasis   Workstation performed: [id number]     Ct Abdomen Pelvis With Contrast    Result Date: 2/14/2019  Narrative: CT ABDOMEN AND PELVIS WITH IV CONTRAST INDICATION:   Right upper quadrant abdominal pain   Prior cholecystectomy  Abnormally elevated lipase  COMPARISON:  January 30, 2019 TECHNIQUE:  CT examination of the abdomen and pelvis was performed  Axial, sagittal, and coronal 2D reformatted images were created from the source data and submitted for interpretation  Radiation dose length product (DLP) for this visit:  1460 44 mGy-cm   This examination, like all CT scans performed in the [facility], was performed utilizing techniques to minimize radiation dose exposure, including the use of iterative reconstruction and automated exposure control  IV Contrast:  100 mL of iohexol (OMNIPAQUE) Enteric Contrast:  Enteric contrast was not administered  FINDINGS: ABDOMEN LOWER CHEST:  Bibasilar atelectasis/scarring again noted  LIVER/BILIARY TREE:  Liver is enlarged and diffusely decreased in density consistent with fatty change  No CT evidence of suspicious hepatic mass  Normal hepatic contours  No biliary dilatation  GALLBLADDER:  Gallbladder is surgically absent  No evidence of complication and no fluid collection noted in the cholecystectomy space  SPLEEN:  Unremarkable  PANCREAS:  A cystic lesion in the pancreatic head measures 2 6 x 2 5 cm on image 39 of series 2  There is a cystic collection in the uncinate process measuring approximately 3 3 x 2 0 cm on image 45 of series 2  These are consistent with evolving pancreatic pseudocysts  Inflammatory stranding in the peripancreatic soft tissue stranding surrounding pancreatic head, pancreatic uncinate process and, to a lesser degree pancreatic tail, is decreased when compared to January 30, 2019 but not entirely resolved  No solid pancreatic mass or pancreatic ductal enlargement noted  ADRENAL GLANDS:  Unremarkable  KIDNEYS/URETERS:  One or more sharply circumscribed subcentimeter renal hypodensities are noted   These lesions are too small to accurately characterize, but are statistically most likely to represent benign cortical renal cyst(s)  According to the guidelines published in the [facility] Paper of the ACR Incidental Findings Committee (Radiology 2010), no further workup of these lesions is recommended  No hydronephrosis  STOMACH AND BOWEL:  Postsurgical changes of gastric bypass  No bowel obstruction  APPENDIX:  No findings to suggest appendicitis  ABDOMINOPELVIC CAVITY:  No ascites or free intraperitoneal air  No lymphadenopathy  VESSELS:  Unremarkable for patient's age  PELVIS REPRODUCTIVE ORGANS:  Unremarkable for patient's age  URINARY BLADDER:  Unremarkable  ABDOMINAL WALL/INGUINAL REGIONS:  Unchanged ventral abdominal wall hernia containing omental fat  OSSEOUS STRUCTURES:  No acute fracture or destructive osseous lesion  Impression: Developing pseudocysts in the pancreatic head and uncinate process  Improved but not entirely resolved changes of interstitial edematous pancreatitis  Hepatomegaly and hepatic steatosis  Postsurgical changes of cholecystectomy without evidence of complication  Expected postsurgical changes of remote prior Adilson-en-Y gastric bypass  Unchanged large fat-containing ventral hernia  The study was marked in [facility] for immediate notification  Workstation performed: [id number]     Ct Abdomen Pelvis With Contrast    Result Date: 1/30/2019  Narrative: CT ABDOMEN AND PELVIS WITH IV CONTRAST INDICATION:   Abdominal pain, unspecified  COMPARISON:  CT abdomen and pelvis 9/25/2018  TECHNIQUE:  CT examination of the abdomen and pelvis was performed  Axial, sagittal, and coronal 2D reformatted images were created from the source data and submitted for interpretation  Radiation dose length product (DLP) for this visit:  2371 mGy-cm   This examination, like all CT scans performed in the [facility], was performed utilizing techniques to minimize radiation dose exposure, including the use of iterative reconstruction and automated exposure control   IV Contrast:  100 mL of iohexol (OMNIPAQUE) Enteric Contrast:  Enteric contrast was not administered  FINDINGS: ABDOMEN LOWER CHEST:  Mild patchy atelectasis in the lower lobes, lingula and right middle lobe  LIVER/BILIARY TREE:  Stable hepatomegaly with steatosis  GALLBLADDER:  There are gallstone(s) within the gallbladder, without pericholecystic inflammatory changes  SPLEEN:  Unremarkable  PANCREAS:  Extensive edematous change in the pancreatic head and uncinate process with surrounding fat stranding and ill-defined peripancreatic fluid consistent with acute pancreatitis  There is mild peripancreatic fat stranding in the body and tail of the pancreas  There is ill-defined fluid in the anterior pararenal space and some of the fluid extends inferiorly in the retroperitoneum  There is inflammatory change about the 2nd and 3rd portions of the duodenum  No organized pancreatic or peripancreatic fluid collection  ADRENAL GLANDS:  Unremarkable  KIDNEYS/URETERS:  One or more sharply circumscribed subcentimeter renal hypodensities are noted  These lesions are too small to accurately characterize, but are statistically most likely to represent benign cortical renal cyst(s)  According to the guidelines published in the [facility] Paper of the ACR Incidental Findings Committee (Radiology 2010), no further workup of these lesions is recommended  No hydronephrosis  STOMACH AND BOWEL:  Postsurgical changes of gastric bypass  No bowel obstruction  APPENDIX:  No findings to suggest appendicitis  ABDOMINOPELVIC CAVITY:  No ascites or free intraperitoneal air  No lymphadenopathy  VESSELS:  Unremarkable for patient's age  PELVIS REPRODUCTIVE ORGANS:  Unremarkable for patient's age  URINARY BLADDER:  Unremarkable  ABDOMINAL WALL/INGUINAL REGIONS:  Stable ventral abdominal wall hernia containing omental fat  OSSEOUS STRUCTURES:  No acute fracture or destructive osseous lesion  Impression: 1  Acute pancreatitis  No organized pancreatic or peripancreatic fluid collection   2  Cholelithiasis without evidence of cholecystitis  3   Stable hepatomegaly with steatosis  The study was marked in [facility] for immediate notification   Workstation performed: [id number]

## 2019-02-22 ENCOUNTER — OFFICE VISIT (OUTPATIENT)
Dept: SURGERY | Facility: HOSPITAL | Age: 44
End: 2019-02-22

## 2019-02-22 VITALS
SYSTOLIC BLOOD PRESSURE: 120 MMHG | WEIGHT: 293 LBS | HEIGHT: 65 IN | BODY MASS INDEX: 48.82 KG/M2 | TEMPERATURE: 100.1 F | DIASTOLIC BLOOD PRESSURE: 70 MMHG

## 2019-02-22 DIAGNOSIS — Z09 POSTOP CHECK: ICD-10-CM

## 2019-02-22 DIAGNOSIS — K80.50 CHOLEDOCHOLITHIASIS: Primary | ICD-10-CM

## 2019-02-22 PROCEDURE — 99024 POSTOP FOLLOW-UP VISIT: CPT | Performed by: SURGERY

## 2019-02-22 RX ORDER — CHOLESTYRAMINE 4 G/9G
POWDER, FOR SUSPENSION ORAL
Qty: 378 G | Refills: 3 | Status: SHIPPED | OUTPATIENT
Start: 2019-02-22 | End: 2021-08-07

## 2019-02-22 NOTE — LETTER
February 22, 2019     Patient: Radha Borges   YOB: 1975   Date of Visit: 2/22/2019       To Whom it May Concern:    Culeln Palma is under my professional care  She was seen in my office on 2/22/2019  She may return to work on 2/28/19 with no restrictions at that time  If you have any questions or concerns, please don't hesitate to call           Sincerely,          Juhi Sales MD        CC: No Recipients

## 2019-02-22 NOTE — PROGRESS NOTES
Assessment/Plan: Joyceann Mcburney is a 37year old female who presents today in post-operative state status post laparoscopic cholecystectomy performed at [facility] on 2/6/19  Pathology showed chronic cholecystitis with cholelithiasis and cholesterolosis  Physical exam revealed the incisions are healing well  Lifting restrictions of no greater than 25 pounds for 2 more weeks  Her diarrhea should improve with time  Fatty foods will likely cause the diarrhea to be worse  She can try taking the Questran to see if that helps  Will order an MRI to make sure that there are not any stones in the bile duct that would be causing her pancreatitis  Will call her with the results  She may follow up as needed  She knows to contact the office if any questions or concerns arise  Hernia- She has an incisional hernia  She should lose at least 30 pounds prior to having the hernia repair surgically with mesh  Explained that at her weight she is at an increased risk for recurrence  No problem-specific Assessment & Plan notes found for this encounter  There are no diagnoses linked to this encounter  Subjective:      Patient ID: Marisela Mily is a 37 y o  female  Joyceann Mcburney is a 37year old female who presents today in post-operative state status post laparoscopic cholecystectomy performed at [facility] on 2/6/19  She reports she is doing well  She says she has had a lot of diarrhea  She says she has had very loose stool about 4 times a day  She has not started taking the Questran yet  She says that she had abdominal pain again after the procedure and was told she has pseudocysts of pancreas  She says the pain is in her right flank and it hurts when she breathes deeply        The following portions of the patient's history were reviewed and updated as appropriate: allergies, current medications, past family history, past medical history, past social history, past surgical history and problem list     Review of Systems Gastrointestinal: Positive for abdominal pain and diarrhea  Objective:      Temp 100 1 °F (37 8 °C) (Tympanic)   Ht 5' 5" (1 651 m)   Wt (!) 139 kg (307 lb 6 4 oz)   BMI 51 15 kg/m²          Physical Exam   Abdominal: A hernia (incisional) is present  Incisions are healing well  By signing my name below, I, Juan Redman, attest that this documentation has been prepared under the direction and in the presence of Mellissa Stephen MD  Electronically Signed: Jeni Kwok  2/22/19  Alayna Khan, personally performed the services described in this documentation  All medical record entries made by the scribe were at my direction and in my presence  I have reviewed the chart and discharge instructions and agree that the record reflects my personal performance and is accurate and complete  Mellissa Stephen MD  2/22/19

## 2019-02-25 ENCOUNTER — TELEPHONE (OUTPATIENT)
Dept: GASTROENTEROLOGY | Facility: CLINIC | Age: 44
End: 2019-02-25

## 2019-02-27 ENCOUNTER — TELEPHONE (OUTPATIENT)
Dept: SURGERY | Facility: HOSPITAL | Age: 44
End: 2019-02-27

## 2019-02-27 NOTE — TELEPHONE ENCOUNTER
Phone call to patient to inform her that her MRI was approved but that she is capitated to [facility]   She asked me to cancel her MRI with [facility] and she said she may wait to schedule her MRI with [facility] as she wasn't sure she wanted to go through with it  I informed her that the Amandeep Neff is good from 2/26 to 4/26/19  She will let us know if she schedules with [facility]

## 2019-03-21 PROBLEM — K76.0 FATTY LIVER: Status: ACTIVE | Noted: 2019-03-21

## 2019-08-20 ENCOUNTER — TELEPHONE (OUTPATIENT)
Dept: GASTROENTEROLOGY | Facility: CLINIC | Age: 44
End: 2019-08-20

## 2019-08-20 NOTE — TELEPHONE ENCOUNTER
Overdue labs ordered 2/21/19  Message left for patient to call back - not sure who the voicemail was

## 2022-08-09 ENCOUNTER — APPOINTMENT (EMERGENCY)
Dept: CT IMAGING | Facility: HOSPITAL | Age: 47
End: 2022-08-09
Payer: COMMERCIAL

## 2022-08-09 ENCOUNTER — HOSPITAL ENCOUNTER (OUTPATIENT)
Facility: HOSPITAL | Age: 47
Setting detail: OUTPATIENT SURGERY
Discharge: HOME | End: 2022-08-09
Attending: STUDENT IN AN ORGANIZED HEALTH CARE EDUCATION/TRAINING PROGRAM | Admitting: STUDENT IN AN ORGANIZED HEALTH CARE EDUCATION/TRAINING PROGRAM
Payer: COMMERCIAL

## 2022-08-09 ENCOUNTER — HOSPITAL ENCOUNTER (EMERGENCY)
Facility: HOSPITAL | Age: 47
End: 2022-08-09
Attending: EMERGENCY MEDICINE
Payer: COMMERCIAL

## 2022-08-09 ENCOUNTER — ANESTHESIA (OUTPATIENT)
Dept: PERIOP | Facility: HOSPITAL | Age: 47
End: 2022-08-09
Payer: COMMERCIAL

## 2022-08-09 ENCOUNTER — APPOINTMENT (OUTPATIENT)
Dept: RADIOLOGY | Facility: HOSPITAL | Age: 47
End: 2022-08-09
Payer: COMMERCIAL

## 2022-08-09 ENCOUNTER — ANESTHESIA EVENT (OUTPATIENT)
Dept: PERIOP | Facility: HOSPITAL | Age: 47
End: 2022-08-09
Payer: COMMERCIAL

## 2022-08-09 VITALS
HEART RATE: 65 BPM | HEIGHT: 65 IN | TEMPERATURE: 98 F | RESPIRATION RATE: 18 BRPM | BODY MASS INDEX: 43.65 KG/M2 | OXYGEN SATURATION: 99 % | WEIGHT: 262 LBS | SYSTOLIC BLOOD PRESSURE: 115 MMHG | DIASTOLIC BLOOD PRESSURE: 69 MMHG

## 2022-08-09 VITALS
SYSTOLIC BLOOD PRESSURE: 118 MMHG | TEMPERATURE: 97.5 F | DIASTOLIC BLOOD PRESSURE: 59 MMHG | OXYGEN SATURATION: 95 % | HEART RATE: 72 BPM | RESPIRATION RATE: 16 BRPM

## 2022-08-09 DIAGNOSIS — N39.0 URINARY TRACT INFECTION WITHOUT HEMATURIA, SITE UNSPECIFIED: ICD-10-CM

## 2022-08-09 DIAGNOSIS — R39.89 SUSPECTED UTI: Primary | ICD-10-CM

## 2022-08-09 DIAGNOSIS — N20.1 LEFT URETERAL STONE: ICD-10-CM

## 2022-08-09 DIAGNOSIS — N39.0 UTI (URINARY TRACT INFECTION): ICD-10-CM

## 2022-08-09 DIAGNOSIS — N13.2 URETERAL STONE WITH HYDRONEPHROSIS: ICD-10-CM

## 2022-08-09 DIAGNOSIS — N20.1 LEFT URETERAL STONE: Primary | ICD-10-CM

## 2022-08-09 DIAGNOSIS — N20.1 LEFT URETERAL CALCULUS: ICD-10-CM

## 2022-08-09 DIAGNOSIS — N17.9 AKI (ACUTE KIDNEY INJURY) (HCC): ICD-10-CM

## 2022-08-09 DIAGNOSIS — N17.9 AKI (ACUTE KIDNEY INJURY) (HCC): Primary | ICD-10-CM

## 2022-08-09 PROBLEM — E66.01 MORBID OBESITY (HCC): Chronic | Status: ACTIVE | Noted: 2018-09-27

## 2022-08-09 PROBLEM — F10.10 ALCOHOL ABUSE: Chronic | Status: ACTIVE | Noted: 2019-01-30

## 2022-08-09 LAB
ALBUMIN SERPL BCP-MCNC: 3.6 G/DL (ref 3.5–5)
ALP SERPL-CCNC: 86 U/L (ref 46–116)
ALT SERPL W P-5'-P-CCNC: 10 U/L (ref 12–78)
AMORPH URATE CRY URNS QL MICRO: ABNORMAL /HPF
ANION GAP SERPL CALCULATED.3IONS-SCNC: 10 MMOL/L (ref 4–13)
AST SERPL W P-5'-P-CCNC: 9 U/L (ref 5–45)
BACTERIA UR QL AUTO: ABNORMAL /HPF
BASOPHILS # BLD AUTO: 0.02 THOUSANDS/ΜL (ref 0–0.1)
BASOPHILS NFR BLD AUTO: 0 % (ref 0–1)
BILIRUB SERPL-MCNC: 1.1 MG/DL (ref 0.2–1)
BILIRUB UR QL STRIP: ABNORMAL
BUN SERPL-MCNC: 13 MG/DL (ref 5–25)
CALCIUM SERPL-MCNC: 8.7 MG/DL (ref 8.3–10.1)
CAOX CRY URNS QL MICRO: ABNORMAL /HPF
CHLORIDE SERPL-SCNC: 107 MMOL/L (ref 96–108)
CLARITY UR: ABNORMAL
CO2 SERPL-SCNC: 24 MMOL/L (ref 21–32)
COLOR UR: ABNORMAL
CREAT SERPL-MCNC: 1.32 MG/DL (ref 0.6–1.3)
EOSINOPHIL # BLD AUTO: 0.16 THOUSAND/ΜL (ref 0–0.61)
EOSINOPHIL NFR BLD AUTO: 1 % (ref 0–6)
ERYTHROCYTE [DISTWIDTH] IN BLOOD BY AUTOMATED COUNT: 12.5 % (ref 11.6–15.1)
ETHANOL SERPL-MCNC: <3 MG/DL (ref 0–3)
EXT PREG TEST URINE: NORMAL
EXT. CONTROL ED NAV: NORMAL
GFR SERPL CREATININE-BSD FRML MDRD: 48 ML/MIN/1.73SQ M
GLUCOSE SERPL-MCNC: 109 MG/DL (ref 65–140)
GLUCOSE UR STRIP-MCNC: NEGATIVE MG/DL
HCT VFR BLD AUTO: 45.5 % (ref 34.8–46.1)
HGB BLD-MCNC: 15.4 G/DL (ref 11.5–15.4)
HGB UR QL STRIP.AUTO: ABNORMAL
IMM GRANULOCYTES # BLD AUTO: 0.03 THOUSAND/UL (ref 0–0.2)
IMM GRANULOCYTES NFR BLD AUTO: 0 % (ref 0–2)
KETONES UR STRIP-MCNC: ABNORMAL MG/DL
LEUKOCYTE ESTERASE UR QL STRIP: NEGATIVE
LIPASE SERPL-CCNC: 122 U/L (ref 73–393)
LYMPHOCYTES # BLD AUTO: 1.72 THOUSANDS/ΜL (ref 0.6–4.47)
LYMPHOCYTES NFR BLD AUTO: 15 % (ref 14–44)
MCH RBC QN AUTO: 32 PG (ref 26.8–34.3)
MCHC RBC AUTO-ENTMCNC: 33.8 G/DL (ref 31.4–37.4)
MCV RBC AUTO: 94 FL (ref 82–98)
MONOCYTES # BLD AUTO: 0.78 THOUSAND/ΜL (ref 0.17–1.22)
MONOCYTES NFR BLD AUTO: 7 % (ref 4–12)
MUCOUS THREADS UR QL AUTO: ABNORMAL
NEUTROPHILS # BLD AUTO: 8.73 THOUSANDS/ΜL (ref 1.85–7.62)
NEUTS SEG NFR BLD AUTO: 77 % (ref 43–75)
NITRITE UR QL STRIP: POSITIVE
NON-SQ EPI CELLS URNS QL MICRO: ABNORMAL /HPF
NRBC BLD AUTO-RTO: 0 /100 WBCS
PH UR STRIP.AUTO: 6 [PH]
PLATELET # BLD AUTO: 185 THOUSANDS/UL (ref 149–390)
PMV BLD AUTO: 11.4 FL (ref 8.9–12.7)
POTASSIUM SERPL-SCNC: 3.5 MMOL/L (ref 3.5–5.3)
PROT SERPL-MCNC: 7 G/DL (ref 6.4–8.4)
PROT UR STRIP-MCNC: ABNORMAL MG/DL
RBC # BLD AUTO: 4.82 MILLION/UL (ref 3.81–5.12)
RBC #/AREA URNS AUTO: ABNORMAL /HPF
SODIUM SERPL-SCNC: 141 MMOL/L (ref 135–147)
SP GR UR STRIP.AUTO: >=1.03 (ref 1–1.03)
UROBILINOGEN UR QL STRIP.AUTO: 1 E.U./DL
WBC # BLD AUTO: 11.44 THOUSAND/UL (ref 4.31–10.16)
WBC #/AREA URNS AUTO: ABNORMAL /HPF

## 2022-08-09 PROCEDURE — 74177 CT ABD & PELVIS W/CONTRAST: CPT

## 2022-08-09 PROCEDURE — 87086 URINE CULTURE/COLONY COUNT: CPT | Performed by: STUDENT IN AN ORGANIZED HEALTH CARE EDUCATION/TRAINING PROGRAM

## 2022-08-09 PROCEDURE — 74420 UROGRAPHY RTRGR +-KUB: CPT

## 2022-08-09 PROCEDURE — 80053 COMPREHEN METABOLIC PANEL: CPT | Performed by: EMERGENCY MEDICINE

## 2022-08-09 PROCEDURE — 85025 COMPLETE CBC W/AUTO DIFF WBC: CPT | Performed by: EMERGENCY MEDICINE

## 2022-08-09 PROCEDURE — G1004 CDSM NDSC: HCPCS

## 2022-08-09 PROCEDURE — 99285 EMERGENCY DEPT VISIT HI MDM: CPT | Performed by: EMERGENCY MEDICINE

## 2022-08-09 PROCEDURE — 81001 URINALYSIS AUTO W/SCOPE: CPT | Performed by: EMERGENCY MEDICINE

## 2022-08-09 PROCEDURE — C1769 GUIDE WIRE: HCPCS | Performed by: STUDENT IN AN ORGANIZED HEALTH CARE EDUCATION/TRAINING PROGRAM

## 2022-08-09 PROCEDURE — 99285 EMERGENCY DEPT VISIT HI MDM: CPT

## 2022-08-09 PROCEDURE — 96375 TX/PRO/DX INJ NEW DRUG ADDON: CPT

## 2022-08-09 PROCEDURE — 52332 CYSTOSCOPY AND TREATMENT: CPT | Performed by: STUDENT IN AN ORGANIZED HEALTH CARE EDUCATION/TRAINING PROGRAM

## 2022-08-09 PROCEDURE — C1758 CATHETER, URETERAL: HCPCS | Performed by: STUDENT IN AN ORGANIZED HEALTH CARE EDUCATION/TRAINING PROGRAM

## 2022-08-09 PROCEDURE — 96374 THER/PROPH/DIAG INJ IV PUSH: CPT

## 2022-08-09 PROCEDURE — 81025 URINE PREGNANCY TEST: CPT | Performed by: EMERGENCY MEDICINE

## 2022-08-09 PROCEDURE — 96365 THER/PROPH/DIAG IV INF INIT: CPT

## 2022-08-09 PROCEDURE — NC001 PR NO CHARGE: Performed by: STUDENT IN AN ORGANIZED HEALTH CARE EDUCATION/TRAINING PROGRAM

## 2022-08-09 PROCEDURE — 83690 ASSAY OF LIPASE: CPT | Performed by: EMERGENCY MEDICINE

## 2022-08-09 PROCEDURE — C2617 STENT, NON-COR, TEM W/O DEL: HCPCS | Performed by: STUDENT IN AN ORGANIZED HEALTH CARE EDUCATION/TRAINING PROGRAM

## 2022-08-09 PROCEDURE — 36415 COLL VENOUS BLD VENIPUNCTURE: CPT | Performed by: EMERGENCY MEDICINE

## 2022-08-09 PROCEDURE — 87040 BLOOD CULTURE FOR BACTERIA: CPT | Performed by: EMERGENCY MEDICINE

## 2022-08-09 PROCEDURE — 96361 HYDRATE IV INFUSION ADD-ON: CPT

## 2022-08-09 PROCEDURE — 82077 ASSAY SPEC XCP UR&BREATH IA: CPT | Performed by: EMERGENCY MEDICINE

## 2022-08-09 DEVICE — INLAY OPTIMA URETERAL STENT W/O GUIDEWIRE
Type: IMPLANTABLE DEVICE | Site: URETER | Status: NON-FUNCTIONAL
Brand: BARD® INLAY OPTIMA® URETERAL STENT
Removed: 2022-09-09

## 2022-08-09 RX ORDER — ONDANSETRON 2 MG/ML
4 INJECTION INTRAMUSCULAR; INTRAVENOUS ONCE AS NEEDED
Status: DISCONTINUED | OUTPATIENT
Start: 2022-08-09 | End: 2022-08-09 | Stop reason: HOSPADM

## 2022-08-09 RX ORDER — FENTANYL CITRATE 50 UG/ML
INJECTION, SOLUTION INTRAMUSCULAR; INTRAVENOUS AS NEEDED
Status: DISCONTINUED | OUTPATIENT
Start: 2022-08-09 | End: 2022-08-09

## 2022-08-09 RX ORDER — LIDOCAINE HYDROCHLORIDE 10 MG/ML
INJECTION, SOLUTION EPIDURAL; INFILTRATION; INTRACAUDAL; PERINEURAL AS NEEDED
Status: DISCONTINUED | OUTPATIENT
Start: 2022-08-09 | End: 2022-08-09

## 2022-08-09 RX ORDER — HYDROMORPHONE HCL/PF 1 MG/ML
0.5 SYRINGE (ML) INJECTION ONCE
Status: COMPLETED | OUTPATIENT
Start: 2022-08-09 | End: 2022-08-09

## 2022-08-09 RX ORDER — CEFTRIAXONE 1 G/50ML
1000 INJECTION, SOLUTION INTRAVENOUS ONCE
Status: COMPLETED | OUTPATIENT
Start: 2022-08-09 | End: 2022-08-09

## 2022-08-09 RX ORDER — DEXAMETHASONE SODIUM PHOSPHATE 10 MG/ML
INJECTION, SOLUTION INTRAMUSCULAR; INTRAVENOUS AS NEEDED
Status: DISCONTINUED | OUTPATIENT
Start: 2022-08-09 | End: 2022-08-09

## 2022-08-09 RX ORDER — TAMSULOSIN HYDROCHLORIDE 0.4 MG/1
0.4 CAPSULE ORAL DAILY PRN
Qty: 30 CAPSULE | Refills: 0 | Status: SHIPPED | OUTPATIENT
Start: 2022-08-09 | End: 2022-08-25 | Stop reason: SDUPTHER

## 2022-08-09 RX ORDER — OXYBUTYNIN CHLORIDE 5 MG/1
5 TABLET ORAL 3 TIMES DAILY PRN
Qty: 30 TABLET | Refills: 0 | Status: SHIPPED | OUTPATIENT
Start: 2022-08-09 | End: 2022-08-25 | Stop reason: SDUPTHER

## 2022-08-09 RX ORDER — FENTANYL CITRATE 50 UG/ML
50 INJECTION, SOLUTION INTRAMUSCULAR; INTRAVENOUS ONCE
Status: COMPLETED | OUTPATIENT
Start: 2022-08-09 | End: 2022-08-09

## 2022-08-09 RX ORDER — ONDANSETRON 2 MG/ML
4 INJECTION INTRAMUSCULAR; INTRAVENOUS ONCE
Status: COMPLETED | OUTPATIENT
Start: 2022-08-09 | End: 2022-08-09

## 2022-08-09 RX ORDER — HYDROMORPHONE HCL IN WATER/PF 6 MG/30 ML
0.2 PATIENT CONTROLLED ANALGESIA SYRINGE INTRAVENOUS
Status: DISCONTINUED | OUTPATIENT
Start: 2022-08-09 | End: 2022-08-09 | Stop reason: HOSPADM

## 2022-08-09 RX ORDER — SODIUM CHLORIDE, SODIUM LACTATE, POTASSIUM CHLORIDE, CALCIUM CHLORIDE 600; 310; 30; 20 MG/100ML; MG/100ML; MG/100ML; MG/100ML
INJECTION, SOLUTION INTRAVENOUS CONTINUOUS PRN
Status: DISCONTINUED | OUTPATIENT
Start: 2022-08-09 | End: 2022-08-09

## 2022-08-09 RX ORDER — SODIUM CHLORIDE 9 MG/ML
50 INJECTION, SOLUTION INTRAVENOUS CONTINUOUS
Status: DISCONTINUED | OUTPATIENT
Start: 2022-08-09 | End: 2022-08-09 | Stop reason: HOSPADM

## 2022-08-09 RX ORDER — EPHEDRINE SULFATE 50 MG/ML
INJECTION INTRAVENOUS AS NEEDED
Status: DISCONTINUED | OUTPATIENT
Start: 2022-08-09 | End: 2022-08-09

## 2022-08-09 RX ORDER — PROPOFOL 10 MG/ML
INJECTION, EMULSION INTRAVENOUS AS NEEDED
Status: DISCONTINUED | OUTPATIENT
Start: 2022-08-09 | End: 2022-08-09

## 2022-08-09 RX ORDER — FENTANYL CITRATE/PF 50 MCG/ML
25 SYRINGE (ML) INJECTION
Status: DISCONTINUED | OUTPATIENT
Start: 2022-08-09 | End: 2022-08-09 | Stop reason: HOSPADM

## 2022-08-09 RX ORDER — MIDAZOLAM HYDROCHLORIDE 2 MG/2ML
INJECTION, SOLUTION INTRAMUSCULAR; INTRAVENOUS AS NEEDED
Status: DISCONTINUED | OUTPATIENT
Start: 2022-08-09 | End: 2022-08-09

## 2022-08-09 RX ORDER — SODIUM CHLORIDE 9 MG/ML
100 INJECTION, SOLUTION INTRAVENOUS CONTINUOUS
Status: DISCONTINUED | OUTPATIENT
Start: 2022-08-09 | End: 2022-08-09 | Stop reason: HOSPADM

## 2022-08-09 RX ORDER — SODIUM CHLORIDE 9 MG/ML
INJECTION, SOLUTION INTRAVENOUS CONTINUOUS PRN
Status: DISCONTINUED | OUTPATIENT
Start: 2022-08-09 | End: 2022-08-09

## 2022-08-09 RX ORDER — GLYCOPYRROLATE 0.2 MG/ML
INJECTION INTRAMUSCULAR; INTRAVENOUS AS NEEDED
Status: DISCONTINUED | OUTPATIENT
Start: 2022-08-09 | End: 2022-08-09

## 2022-08-09 RX ORDER — MAGNESIUM HYDROXIDE 1200 MG/15ML
LIQUID ORAL AS NEEDED
Status: DISCONTINUED | OUTPATIENT
Start: 2022-08-09 | End: 2022-08-09 | Stop reason: HOSPADM

## 2022-08-09 RX ORDER — SULFAMETHOXAZOLE AND TRIMETHOPRIM 800; 160 MG/1; MG/1
1 TABLET ORAL EVERY 12 HOURS SCHEDULED
Qty: 14 TABLET | Refills: 0 | Status: SHIPPED | OUTPATIENT
Start: 2022-08-09 | End: 2022-08-16

## 2022-08-09 RX ORDER — ONDANSETRON 2 MG/ML
INJECTION INTRAMUSCULAR; INTRAVENOUS AS NEEDED
Status: DISCONTINUED | OUTPATIENT
Start: 2022-08-09 | End: 2022-08-09

## 2022-08-09 RX ORDER — PHENAZOPYRIDINE HYDROCHLORIDE 100 MG/1
100 TABLET, FILM COATED ORAL 3 TIMES DAILY PRN
Qty: 10 TABLET | Refills: 0 | Status: ON HOLD | OUTPATIENT
Start: 2022-08-09 | End: 2022-10-30 | Stop reason: ALTCHOICE

## 2022-08-09 RX ADMIN — HYDROMORPHONE HYDROCHLORIDE 0.5 MG: 1 INJECTION, SOLUTION INTRAMUSCULAR; INTRAVENOUS; SUBCUTANEOUS at 10:55

## 2022-08-09 RX ADMIN — GLYCOPYRROLATE 0.1 MG: 0.2 INJECTION INTRAMUSCULAR; INTRAVENOUS at 14:28

## 2022-08-09 RX ADMIN — SODIUM CHLORIDE 1000 ML: 0.9 INJECTION, SOLUTION INTRAVENOUS at 05:33

## 2022-08-09 RX ADMIN — FENTANYL CITRATE 25 MCG: 50 INJECTION INTRAMUSCULAR; INTRAVENOUS at 14:35

## 2022-08-09 RX ADMIN — FENTANYL CITRATE 50 MCG: 50 INJECTION, SOLUTION INTRAMUSCULAR; INTRAVENOUS at 06:07

## 2022-08-09 RX ADMIN — EPHEDRINE SULFATE 10 MG: 50 INJECTION INTRAVENOUS at 14:31

## 2022-08-09 RX ADMIN — HYDROMORPHONE HYDROCHLORIDE 0.2 MG: 0.2 INJECTION, SOLUTION INTRAMUSCULAR; INTRAVENOUS; SUBCUTANEOUS at 15:41

## 2022-08-09 RX ADMIN — ONDANSETRON HYDROCHLORIDE 4 MG: 2 INJECTION, SOLUTION INTRAMUSCULAR; INTRAVENOUS at 14:26

## 2022-08-09 RX ADMIN — DEXAMETHASONE SODIUM PHOSPHATE 10 MG: 10 INJECTION, SOLUTION INTRAMUSCULAR; INTRAVENOUS at 14:26

## 2022-08-09 RX ADMIN — LIDOCAINE HYDROCHLORIDE 50 MG: 10 INJECTION, SOLUTION EPIDURAL; INFILTRATION; INTRACAUDAL; PERINEURAL at 14:23

## 2022-08-09 RX ADMIN — PROPOFOL 50 MG: 10 INJECTION, EMULSION INTRAVENOUS at 14:24

## 2022-08-09 RX ADMIN — MIDAZOLAM 2 MG: 1 INJECTION INTRAMUSCULAR; INTRAVENOUS at 14:16

## 2022-08-09 RX ADMIN — ONDANSETRON 4 MG: 2 INJECTION INTRAMUSCULAR; INTRAVENOUS at 06:07

## 2022-08-09 RX ADMIN — CEFTRIAXONE 1000 MG: 1 INJECTION, SOLUTION INTRAVENOUS at 08:35

## 2022-08-09 RX ADMIN — PHENYLEPHRINE HYDROCHLORIDE 30 MCG/MIN: 10 INJECTION INTRAVENOUS at 14:35

## 2022-08-09 RX ADMIN — PROPOFOL 150 MG: 10 INJECTION, EMULSION INTRAVENOUS at 14:23

## 2022-08-09 RX ADMIN — SODIUM CHLORIDE: 0.9 INJECTION, SOLUTION INTRAVENOUS at 14:16

## 2022-08-09 RX ADMIN — FENTANYL CITRATE 25 MCG: 50 INJECTION INTRAMUSCULAR; INTRAVENOUS at 14:30

## 2022-08-09 RX ADMIN — HYDROMORPHONE HYDROCHLORIDE 0.5 MG: 1 INJECTION, SOLUTION INTRAMUSCULAR; INTRAVENOUS; SUBCUTANEOUS at 07:31

## 2022-08-09 RX ADMIN — SODIUM CHLORIDE 50 ML/HR: 0.9 INJECTION, SOLUTION INTRAVENOUS at 09:03

## 2022-08-09 RX ADMIN — HYDROMORPHONE HYDROCHLORIDE 0.2 MG: 0.2 INJECTION, SOLUTION INTRAMUSCULAR; INTRAVENOUS; SUBCUTANEOUS at 15:06

## 2022-08-09 RX ADMIN — SODIUM CHLORIDE 1000 ML: 0.9 INJECTION, SOLUTION INTRAVENOUS at 06:14

## 2022-08-09 RX ADMIN — IOHEXOL 64 ML: 350 INJECTION, SOLUTION INTRAVENOUS at 07:16

## 2022-08-09 NOTE — ANESTHESIA POSTPROCEDURE EVALUATION
Post-Op Assessment Note    CV Status:  Stable  Pain Score: 0    Pain management: adequate     Mental Status:  Alert and awake   Hydration Status:  Euvolemic   PONV Controlled:  Controlled   Airway Patency:  Patent      Post Op Vitals Reviewed: Yes      Staff: CRNA, Anesthesiologist         No complications documented      BP   1128/72   Temp   97 4   Pulse  86   Resp   18   SpO2   100

## 2022-08-09 NOTE — EMTALA/ACUTE CARE TRANSFER
[facility] EMERGENCY DEPARTMENT  [address]  [facility] [address]  Dept: [phone]      EMTALA TRANSFER CONSENT    NAME Kera Maravilla                                         1975                              MRN [id number]    I have been informed of my rights regarding examination, treatment, and transfer   by Dr Elio Jones DO    Benefits: Specialized equipment and/or services available at the receiving facility (Include comment)________________________    Risks: Potential for delay in receiving treatment, Potential deterioration of medical condition, Loss of IV, Increased discomfort during transfer, Possible worsening of condition or death during transfer      Consent for Transfer:  I acknowledge that my medical condition has been evaluated and explained to me by the emergency department physician or other qualified medical person and/or my attending physician, who has recommended that I be transferred to the service of  Accepting Physician: Dr Tyler Mcarthur at [address] [place] [address] Name, [facility]  The above potential benefits of such transfer, the potential risks associated with such transfer, and the probable risks of not being transferred have been explained to me, and I fully understand them  The doctor has explained that, in my case, the benefits of transfer outweigh the risks  I agree to be transferred  I authorize the performance of emergency medical procedures and treatments upon me in both transit and upon arrival at the receiving facility  Additionally, I authorize the release of any and all medical records to the receiving facility and request they be transported with me, if possible  I understand that the safest mode of transportation during a medical emergency is an ambulance and that the Hospital advocates the use of this mode of transport   Risks of traveling to the receiving facility by car, including absence of medical control, life sustaining equipment, such as oxygen, and medical personnel has been explained to me and I fully understand them  (CECILIA CORRECT BOX BELOW)  [  ]  I consent to the stated transfer and to be transported by ambulance/helicopter  [  ]  I consent to the stated transfer, but refuse transportation by ambulance and accept full responsibility for my transportation by car  I understand the risks of non-ambulance transfers and I exonerate the Hospital and its staff from any deterioration in my condition that results from this refusal     X___________________________________________    DATE  22  TIME________  Signature of patient or legally responsible individual signing on patient behalf           RELATIONSHIP TO PATIENT_________________________          Provider Certification    NAME Antwan Coburn                                         1975                              MRN [id number]    A medical screening exam was performed on the above named patient  Based on the examination:    Condition Necessitating Transfer The primary encounter diagnosis was CHARLIE (acute kidney injury) ([facility] [address] )  Diagnoses of Urinary tract infection without hematuria, site unspecified, Left ureteral calculus, Ureteral stone with hydronephrosis, and UTI (urinary tract infection) were also pertinent to this visit      Patient Condition: The patient has been stabilized such that within reasonable medical probability, no material deterioration of the patient condition or the condition of the unborn child(alcon) is likely to result from the transfer    Reason for Transfer: Level of Care needed not available at this facility    Transfer Requirements: Facility \[facility] [place] [facility]\""   · Space available and qualified personnel available for treatment as acknowledged by    · Agreed to accept transfer and to provide appropriate medical treatment as acknowledged by       Dr Satish Garcia  · Appropriate medical records of the examination and treatment of the patient are provided at the time of transfer   [address] _______  · Transfer will be performed by qualified personnel from    and appropriate transfer equipment as required, including the use of necessary and appropriate life support measures  Provider Certification: I have examined the patient and explained the following risks and benefits of being transferred/refusing transfer to the patient/family:  General risk, such as traffic hazards, adverse weather conditions, rough terrain or turbulence, possible failure of equipment (including vehicle or aircraft), or consequences of actions of persons outside the control of the transport personnel, Unanticipated needs of medical equipment and personnel during transport, Risk of worsening condition, The possibility of a transport vehicle being unavailable      Based on these reasonable risks and benefits to the patient and/or the unborn child(alcon), and based upon the information available at the time of the patients examination, I certify that the medical benefits reasonably to be expected from the provision of appropriate medical treatments at another medical facility outweigh the increasing risks, if any, to the individuals medical condition, and in the case of labor to the unborn child, from effecting the transfer      X____________________________________________ DATE 08/09/22        TIME_______      ORIGINAL - SEND TO MEDICAL RECORDS   COPY - SEND WITH PATIENT DURING TRANSFER

## 2022-08-09 NOTE — DISCHARGE INSTRUCTIONS
Kyra,    I placed a left stent today  Michael Nicolasa is my surgery scheduler and will reach out to make arrangements for stone treatment  If you have any questions please call the office at [phone]  Please take Bactrim twice a day for 1 week  Best      Dr Max Vásquez    Instructions for Ureteral Stent      A stent was placed in your ureter (the tube from your kidney to your bladder)    Some discomfort is normal  Certain movements may trigger pain or a feeling that you need to urinate  You may also feel soreness or pressure before or during urination  These symptoms will go away a few days after the stent has been removed  Your urine may be slightly pink or red  This is due to bleeding caused by minor irritation from the stent  This may happen on and off while you have the stent in place  It is important to drink water (at least 1 liter or quart each day) to dilute your urine  AFTER the stent is removed you may have ureteral spasms  This may feel very similar to pain caused by a kidney stone  This pain will pass with time as the swelling in your ureter subsides  Warm showers can help with ureteral spasm pain  The ureteral stent is temporary and should not remain in your body  A stent that stays in for too long can be dangerous and possibly require major surgery to extract from your body  It is extremely important that you keep your follow up appointments with the urology department  Please call our office at [phone] if you have any questions  Control of Pain and Stent Discomfort:   Ibuprofen (Motrin) and acetaminophen (Tylenol) according to manufacturers instructions  Take tamsulosin (Flomax) 0 4 mg daily to help with stent discomfort  Take Ditropan (oxybutynin) daily as needed for urinary frequency/urgency while stent is in place

## 2022-08-09 NOTE — ED PROVIDER NOTES
History  Chief Complaint   Patient presents with    Abdominal Pain     Pt c/o of lower right abdominal pain that started Monday morning  54 yo F with PMH of ETOH abuse (sober 4 yrs), asthma, pancreatitis, prior cholecystectomy, gastric bypass 2001 [facility]) presents to ED with abd pain, started yesterday  Started RLQ, now LLQ as well  Radiates to L flank  No similar in past  No CP/SOB  Has had decreased appetite, weight loss in general recently  Recent UTI, finished abx (most likely macrobid according to pt) 2 days ago  Sx resolved  Nausea, no vomiting  No fevers  No trauma  No change in bowel habits  History provided by:  Patient and medical records   used: No    Abdominal Pain  Pain location:  RLQ  Pain quality: cramping    Pain radiates to:  LLQ and L flank  Pain severity:  Moderate  Onset quality:  Gradual  Timing:  Constant  Progression:  Worsening  Associated symptoms: nausea    Associated symptoms: no chest pain, no chills, no constipation, no cough, no diarrhea, no dysuria, no fatigue, no fever, no hematuria, no shortness of breath, no sore throat and no vomiting        Prior to Admission Medications   Prescriptions Last Dose Informant Patient Reported? Taking?    FLUoxetine (PROzac) 10 mg capsule   Yes No   Sig: Take 20 mg by mouth daily      Facility-Administered Medications: None       Past Medical History:   Diagnosis Date    Alcohol abuse 1/30/2019    Asthma     Diarrhea due to malabsorption 2/21/2019    Fatty liver 3/21/2019    Morbid obesity ([address] ) 9/27/2018    Pancreatitis        Past Surgical History:   Procedure Laterality Date    CHOLECYSTECTOMY LAPAROSCOPIC N/A 2/6/2019    Procedure: CHOLECYSTECTOMY LAPAROSCOPIC with ioc;  Surgeon: Gustavo Cowart MD;  Location:  MAIN OR;  Service: General    GASTRIC BYPASS         Family History   Problem Relation Age of Onset    Alcohol abuse Maternal Grandfather     Colon polyps Neg Hx     Colon cancer Neg Hx I have reviewed and agree with the history as documented  E-Cigarette/Vaping     E-Cigarette/Vaping Substances     Social History     Tobacco Use    Smoking status: Never Smoker    Smokeless tobacco: Never Used   Substance Use Topics    Alcohol use: Yes     Alcohol/week: 5 0 standard drinks     Types: 5 Shots of liquor per week     Comment: past hx of alcohol abuse  Last drink 1/27    Drug use: No       Review of Systems   Constitutional: Positive for appetite change and unexpected weight change  Negative for chills, fatigue and fever  HENT: Negative for congestion, ear pain, rhinorrhea, sore throat, trouble swallowing and voice change  Eyes: Negative for pain and visual disturbance  Respiratory: Negative for cough, chest tightness and shortness of breath  Cardiovascular: Negative for chest pain, palpitations and leg swelling  Gastrointestinal: Positive for abdominal pain and nausea  Negative for blood in stool, constipation, diarrhea and vomiting  Genitourinary: Positive for flank pain  Negative for difficulty urinating, dysuria and hematuria  Musculoskeletal: Negative for back pain, neck pain and neck stiffness  Skin: Negative for rash  Neurological: Negative for dizziness, syncope, speech difficulty, light-headedness and headaches  Psychiatric/Behavioral: Negative for confusion and suicidal ideas  Physical Exam  Physical Exam  Vitals and nursing note reviewed  Constitutional:       General: She is not in acute distress  Appearance: She is well-developed  She is obese  She is not ill-appearing, toxic-appearing or diaphoretic  HENT:      Head: Normocephalic and atraumatic  Right Ear: External ear normal       Left Ear: External ear normal       Nose: Nose normal    Eyes:      General: No scleral icterus  Right eye: No discharge  Left eye: No discharge        Conjunctiva/sclera: Conjunctivae normal       Pupils: Pupils are equal, round, and reactive to light  Neck:      Trachea: No tracheal deviation  Cardiovascular:      Rate and Rhythm: Normal rate and regular rhythm  Heart sounds: Normal heart sounds  No murmur heard  No friction rub  No gallop  Pulmonary:      Effort: Pulmonary effort is normal  No respiratory distress  Breath sounds: Normal breath sounds  No stridor  Chest:      Chest wall: No tenderness  Abdominal:      General: Bowel sounds are normal       Palpations: Abdomen is soft  Tenderness: There is abdominal tenderness in the right lower quadrant, periumbilical area and left lower quadrant  There is left CVA tenderness  There is no guarding or rebound  Musculoskeletal:         General: No deformity  Normal range of motion  Cervical back: Normal range of motion and neck supple  Lymphadenopathy:      Cervical: No cervical adenopathy  Skin:     General: Skin is warm and dry  Findings: No rash  Neurological:      Mental Status: She is alert and oriented to person, place, and time  Cranial Nerves: No cranial nerve deficit  Sensory: No sensory deficit        Coordination: Coordination normal    Psychiatric:         Behavior: Behavior normal          Vital Signs  ED Triage Vitals   Temperature Pulse Respirations Blood Pressure SpO2   08/09/22 0514 08/09/22 0514 08/09/22 0514 08/09/22 0514 08/09/22 0514   98 °F (36 7 °C) 75 18 130/82 100 %      Temp src Heart Rate Source Patient Position - Orthostatic VS BP Location FiO2 (%)   -- 08/09/22 0600 08/09/22 0600 08/09/22 0600 --    Monitor Sitting Left arm       Pain Score       08/09/22 0514       8           Vitals:    08/09/22 0514 08/09/22 0600   BP: 130/82 114/63   Pulse: 75 60   Patient Position - Orthostatic VS:  Sitting         Visual Acuity      ED Medications  Medications   sodium chloride 0 9 % bolus 1,000 mL (1,000 mL Intravenous New Bag 8/9/22 0533)   sodium chloride 0 9 % bolus 1,000 mL (has no administration in time range)   ondansetron [facility] injection 4 mg (4 mg Intravenous Given 8/9/22 0607)   fentanyl citrate (PF) 100 MCG/2ML 50 mcg (50 mcg Intravenous Given 8/9/22 0607)       Diagnostic Studies  Results Reviewed     Procedure Component Value Units Date/Time    Comprehensive metabolic panel [[id number]]  (Abnormal) Collected: 08/09/22 0525    Lab Status: Final result Specimen: Blood from Arm, Left Updated: 08/09/22 0610     Sodium 141 mmol/L      Potassium 3 5 mmol/L      Chloride 107 mmol/L      CO2 24 mmol/L      ANION GAP 10 mmol/L      BUN 13 mg/dL      Creatinine 1 32 mg/dL      Glucose 109 mg/dL      Calcium 8 7 mg/dL      AST 9 U/L      ALT 10 U/L      Alkaline Phosphatase 86 U/L      Total Protein 7 0 g/dL      Albumin 3 6 g/dL      Total Bilirubin 1 10 mg/dL      eGFR 48 ml/min/1 73sq m     Narrative:      [place] guidelines for Chronic Kidney Disease (CKD):     Stage 1 with normal or high GFR (GFR > 90 mL/min/1 73 square meters)    Stage 2 Mild CKD (GFR = 60-89 mL/min/1 73 square meters)    Stage 3A Moderate CKD (GFR = 45-59 mL/min/1 73 square meters)    Stage 3B Moderate CKD (GFR = 30-44 mL/min/1 73 square meters)    Stage 4 Severe CKD (GFR = 15-29 mL/min/1 73 square meters)    Stage 5 End Stage CKD (GFR <15 mL/min/1 73 square meters)  Note: GFR calculation is accurate only with a steady state creatinine    Ethanol [[id number]]  (Normal) Collected: 08/09/22 0525    Lab Status: Final result Specimen: Blood from Arm, Left Updated: 08/09/22 0610     Ethanol Lvl <3 mg/dL     Lipase [[id number]]  (Normal) Collected: 08/09/22 0525    Lab Status: Final result Specimen: Blood from Arm, Left Updated: 08/09/22 0600     Lipase 122 u/L     CBC and differential [[id number]]  (Abnormal) Collected: 08/09/22 0525    Lab Status: Final result Specimen: Blood from Hand, Left Updated: 08/09/22 0544     WBC 11 44 Thousand/uL      RBC 4 82 Million/uL      Hemoglobin 15 4 g/dL      Hematocrit 45 5 %      MCV 94 fL      MCH 32 0 pg      MCHC 33 8 g/dL      RDW 12 5 %      MPV 11 4 fL      Platelets 418 Thousands/uL      nRBC 0 /100 WBCs      Neutrophils Relative 77 %      Immat GRANS % 0 %      Lymphocytes Relative 15 %      Monocytes Relative 7 %      Eosinophils Relative 1 %      Basophils Relative 0 %      Neutrophils Absolute 8 73 Thousands/µL      Immature Grans Absolute 0 03 Thousand/uL      Lymphocytes Absolute 1 72 Thousands/µL      Monocytes Absolute 0 78 Thousand/µL      Eosinophils Absolute 0 16 Thousand/µL      Basophils Absolute 0 02 Thousands/µL     UA w Reflex to Microscopic w Reflex to Culture [[id number]]  (Abnormal) Collected: 08/09/22 0517    Lab Status: Final result Specimen: Urine, Clean Catch Updated: 08/09/22 0530     Color, UA Orange     Clarity, UA Turbid     Specific Gravity, UA >=1 030     pH, UA 6 0     Leukocytes, UA Negative     Nitrite, UA Positive     Protein, UA Trace mg/dl      Glucose, UA Negative mg/dl      Ketones, UA Trace mg/dl      Urobilinogen, UA 1 0 E U /dl      Bilirubin, UA Interference- unable to analyze     Occult Blood, UA Moderate    Urine Microscopic [[id number]] Collected: 08/09/22 0517    Lab Status: In process Specimen: Urine, Clean Catch Updated: 08/09/22 0530    POCT pregnancy, urine [[id number]]  (Normal) Resulted: 08/09/22 0518    Lab Status: Final result Updated: 08/09/22 0518     EXT PREG TEST UR (Ref: Negative) neg     Control valid                 CT abdomen pelvis with contrast    (Results Pending)              Procedures  Procedures         ED Course  ED Course as of 08/09/22 0614   Tue Aug 09, 2022   8808 Pt awaiting CT scan a/p  S/o to Dr Jamil Quiroz at end of shift  Pt with CHARLIE, fluids running  Urine suspicious for UTI, though no dysuria/frequency and just finished abx                                  SBIRT 20yo+    Flowsheet Row Most Recent Value   SBIRT (23 yo +)    In order to provide better care to our patients, we are screening all of our patients for alcohol and drug use  Would it be okay to ask you these screening questions? Yes Filed at: 08/09/2022 2801   Initial Alcohol Screen: US AUDIT-C     1  How often do you have a drink containing alcohol? 0 Filed at: 08/09/2022 0538   2  How many drinks containing alcohol do you have on a typical day you are drinking? 0 Filed at: 08/09/2022 0538   3a  Male UNDER 65: How often do you have five or more drinks on one occasion? 0 Filed at: 08/09/2022 0538   3b  FEMALE Any Age, or MALE 65+: How often do you have 4 or more drinks on one occassion? 0 Filed at: 08/09/2022 0538   Audit-C Score 0 Filed at: 08/09/2022 0905   MAURY: How many times in the past year have you    Used an illegal drug or used a prescription medication for non-medical reasons? Never Filed at: 08/09/2022 3491                    MDM  Number of Diagnoses or Management Options  CHARLIE (acute kidney injury) [facility]): new and requires workup     Amount and/or Complexity of Data Reviewed  Clinical lab tests: ordered and reviewed  Tests in the radiology section of CPT®: ordered  Tests in the medicine section of CPT®: ordered and reviewed  Review and summarize past medical records: yes  Discuss the patient with other providers: yes    Risk of Complications, Morbidity, and/or Mortality  Presenting problems: moderate  Diagnostic procedures: low  Management options: low    Patient Progress  Patient progress: stable      Disposition  Final diagnoses:   CHARLIE (acute kidney injury) ([facility] [address] )     Time reflects when diagnosis was documented in both MDM as applicable and the Disposition within this note     Time User Action Codes Description Comment    8/9/2022  6:12 AM Tom FRIED Add [N17 9] CHARLIE (acute kidney injury) [facility])       ED Disposition     None      Follow-up Information    None         Patient's Medications   Discharge Prescriptions    No medications on file       No discharge procedures on file      PDMP Review     None          ED Provider  Electronically Signed by Awilda Victor MD  08/09/22 1009

## 2022-08-09 NOTE — TELEPHONE ENCOUNTER
Plan:    - Discharge to home if afebrile  - Bactrim DS BID x 7 days  - F/u cultures  - Will make arrangements for definitive stone treatment in the coming weeks     SIGNATURE: Christa Clifford MD  DATE: 8/9/2022  TIME: 3:26 PM       Will monitor for discharge to arrange for recommended follow up and stone treatment

## 2022-08-09 NOTE — ASSESSMENT & PLAN NOTE
Patient originally presented to [facility] with right lower quadrant pain radiating to the flank, found to have a proximal left ureteral colic close of 7 mm with left hydronephrosis along with UTI ( positive nitrates and moderate amount of bacterias) and evidence of CHARLIE , Cr went up to 1 32 , baseline 0 6  No positive sepsis criteria  As per urology patient was transferred to B directly to the OR  Patient underwent  Will provide IV fluids ,re-evaluate need of  fluids in  a m  Repeat BMP in am  Continue current pain management  Will give ceftriaxone X 2, can be  change to p o   Keflex tomorrow if good clinical response, follow-up urine cultures

## 2022-08-09 NOTE — ED CARE HANDOFF
Emergency Department Sign Out Note        Sign out and transfer of care from Dr Catracho Gamez  See Separate Emergency Department note  The patient, Gus Rivero, was evaluated by the previous provider for left flank pain/ uti  ED Course as of 08/09/22 1058   Tue Aug 09, 2022   6559 Patient is a 54 yo F who recently completed antibiotics for UTI p/w abdominal and flank pain  Has an CHARLIE and urine appears infected  CT AP pending  Regardless of results, will require admission for failure of outpatient therapy  7227 CT imaging consistent with "Proximal left ureteral calculus, 7 mm  This results in left hydronephrosis with delayed left renal nephrogram      Ventral hernia containing a nonobstructed loop of transverse colon  Surgical changes of prior Adilson-en-Y gastric bypass "   3013 Tigertexted urology, Fredy Alas, in setting of UTI + hydro/ stone to discuss plan of care  7351 Urology recommends transfer to [facility] to [facility]  keep NPO, and patient should be able to get her on this afternoon for stent placement     1054 Patient accepted as a transfer to B under Dr Santo Andino     Procedures  MDM        Disposition  Final diagnoses:   CHARLIE (acute kidney injury) ([facility] [address] )   Ureteral stone with hydronephrosis   UTI (urinary tract infection)     Time reflects when diagnosis was documented in both MDM as applicable and the Disposition within this note     Time User Action Codes Description Comment    8/9/2022  6:12 AM Jessica Mills S Add [N17 9] CHARLIE (acute kidney injury) ([facility] [address] )     8/9/2022  8:35 AM Kristian Sensor Add [N39 0] Urinary tract infection without hematuria, site unspecified     8/9/2022  8:35 AM Kristian Sensor Add [N20 1] Left ureteral calculus     8/9/2022  8:40 AM Elizabeth Opoka Add [N13 2] Ureteral stone with hydronephrosis     8/9/2022  8:40 AM Elizabeth Opoka Add [N39 0] UTI (urinary tract infection)       ED Disposition     ED Disposition   Transfer to Another Facility-In Network    Condition   --    Date/Time   Tue Aug 9, 2022  8:40 AM    Comment   Ann Marie Roger should be transferred out to B  MD Documentation    Erika Orts Most Recent Value   Patient Condition The patient has been stabilized such that within reasonable medical probability, no material deterioration of the patient condition or the condition of the unborn child(alcon) is likely to result from the transfer   Reason for Transfer Level of Care needed not available at this facility   Benefits of Transfer Specialized equipment and/or services available at the receiving facility (Include comment)________________________   Risks of Transfer Potential for delay in receiving treatment, Potential deterioration of medical condition, Loss of IV, Increased discomfort during transfer, Possible worsening of condition or death during transfer   Accepting Physician Dr Oswaldo Mott Name, Campbell Escobar   Provider Certification General risk, such as traffic hazards, adverse weather conditions, rough terrain or turbulence, possible failure of equipment (including vehicle or aircraft), or consequences of actions of persons outside the control of the transport personnel, Unanticipated needs of medical equipment and personnel during transport, Risk of worsening condition, The possibility of a transport vehicle being unavailable      RN Documentation    [address] Name, [address]  [facility]      Follow-up Information    None       Patient's Medications   Discharge Prescriptions    No medications on file     No discharge procedures on file         ED Provider  Electronically Signed by     Sue Ferrera DO  08/09/22 0069

## 2022-08-09 NOTE — OP NOTE
OPERATIVE REPORT     Name: Kimber Zhong     MRN: [id number]  Date: 8/9/2022 Time: 3:26 PM     Location:       [facility]   Date of Operation:  8/9/2022   Service: Urology     Pre-op Diagnosis:  Left ureteral calculus, UTI    Post-op Diagnosis:  Same     Operation:     Cystoscopy  Left Retrograde Pyelogram   Left ureteral stent placement (6Fr x 26 cm JJ stent)  Fluoroscopic Guidance    Surgeon:  Lulu Mendez MD  Assistants:  None      Anesthesia:  LMA   Drains: Left ureteral stent (6Fr x 26 cm JJ stent)  Estimated Blood Loss:  Minimal   Urine Output:  N/A   Specimens: Left renal pelvis urine for culture  Apparent Intraoperative Complications:  None   Patient Condition:  Stable   Disposition:  PACU  Antibiotic Prophylaxis:  Rocephin     Indications:     55 y o  female with a left ureteral stent and +UTI transferring to [facility] from [address] [place] [address] for left stent placement  Risks, benefits and alternatives to treatment were discussed with the patient who elected to proceed with the operation  Findings:    - normal urethra    - orthotopic ureteral orifices   - normal bladder mucosa     Operative Report:    The patient was identified, and the procedure verified  After induction of anesthesia the patient was prepped and draped in the dorsolithotomy position  ? A  film was obtained  A 22 5 Fr rigid cystoscope was passed per urethra to the bladder revealing the above findings  A SoloPlus wire was used to intubate the left ureteral orifice and was passed up to the renal pelvis under fluoroscopic guidance  A 5Fr open ended ureteral catheter was inserted over the wire and passed up to the renal pelvis  ? The wire was removed and a retrograde pyelogram obtained  ?A urine sample was obtained for culture  The wire was replaced and passed up to the kidney under fluoroscopic guidance  ? A 6Fr ?x 26cm? JJ stent was placed in standard retrograde fashion under fluoroscopic guidance  ? Upon removal of the wire an adequate curl was noted in the renal pelvis and the bladder on fluoroscopy  The bladder was emptied  The patient was awoken from anesthesia?and transferred to PACU in satisfactory condition  ? Left retrograde pyelogram interpretation: normal course of the ureter, no filling defects, smooth contours of the renal pelvis, sharp calyces, mild hydronephrosis      I was present for the entire procedure     Plan:    - Discharge to home if afebrile  - Bactrim DS BID x 7 days  - F/u cultures  - Will make arrangements for definitive stone treatment in the coming weeks    SIGNATURE: To Macias MD  DATE: 8/9/2022  TIME: 3:26 PM

## 2022-08-09 NOTE — H&P
UROLOGY HISTORY AND PHYSICAL     Name: Shameka Patel  : 1975  MRN: [id number]  Date of Service: 22      CHIEF COMPLAINT   Left ureteral stone    History of Present Illness:     Shameka Patel is a 55 y o  female who presented to to [address] [place] [address]  [facility] [facility] ED with RLQ radiating to the L flank, nausea without vomiting, without fever  CT was performed demonstrating a 7 mm left proximal ureteral stone  A couple of weeks back the patient had symptoms that were concerning for COVID  She also had urinary urgency and frequency and was treated for UTI  She thinks that the antibiotic might of been Macrobid  Her UA was grossly positive for infection  Leukocytosis of 11 4, CHARLIE with creatinine of 1 32 from baseline of 0 6           PAST MEDICAL HISTORY:     Past Medical History:   Diagnosis Date    Alcohol abuse 2019    Asthma     Diarrhea due to malabsorption 2019    Fatty liver 3/21/2019    Morbid obesity ([address] ) 2018    Pancreatitis        PAST SURGICAL HISTORY:     Past Surgical History:   Procedure Laterality Date    CHOLECYSTECTOMY LAPAROSCOPIC N/A 2019    Procedure: CHOLECYSTECTOMY LAPAROSCOPIC with ioc;  Surgeon: Daniel Victor MD;  Location: [facility] OR;  Service: General    GASTRIC BYPASS         CURRENT MEDICATIONS:     Current Facility-Administered Medications   Medication Dose Route Frequency Provider Last Rate Last Admin    fentaNYL (SUBLIMAZE) injection 25 mcg  25 mcg Intravenous Q3 min PRN Kirti Guevara CRNA        HYDROmorphone HCl (DILAUDID) injection 0 2 mg  0 2 mg Intravenous Q5 Min PRN Tae Conrad MD   0 2 mg at 22 1506    ondansetron (ZOFRAN) injection 4 mg  4 mg Intravenous Once PRN Kirti Guevara CRNA        sodium chloride 0 9 % infusion  100 mL/hr Intravenous Continuous Kirti Guevara CRNA           ALLERGIES:     Allergies   Allergen Reactions    Ciprofloxacin     Latex     Morphine Hallucinations    Tomato - Food Allergy      Tongue irritation    Augmentin [Amoxicillin-Pot Clavulanate] Rash    Doxycycline Rash    Penicillins Rash       SOCIAL HISTORY:     Social History     Socioeconomic History    Marital status: /Civil Union     Spouse name: Not on file    Number of children: Not on file    Years of education: Not on file    Highest education level: Not on file   Occupational History    Not on file   Tobacco Use    Smoking status: Never Smoker    Smokeless tobacco: Never Used   Substance and Sexual Activity    Alcohol use: Yes     Alcohol/week: 5 0 standard drinks     Types: 5 Shots of liquor per week     Comment: past hx of alcohol abuse  Last drink 1/27    Drug use: No    Sexual activity: Not on file   Other Topics Concern    Not on file   Social History Narrative    Not on file     Social Determinants of Health     Financial Resource Strain: Not on file   Food Insecurity: Not on file   Transportation Needs: Not on file   Physical Activity: Not on file   Stress: Not on file   Social Connections: Not on file   Intimate Partner Violence: Not on file   Housing Stability: Not on file       FAMILY HISTORY:     Family History   Problem Relation Age of Onset    Alcohol abuse Maternal Grandfather     Colon polyps Neg Hx     Colon cancer Neg Hx        REVIEW OF SYSTEMS:     Review of Systems   Constitutional: Negative for chills, fever and unexpected weight change  HENT: Negative for hearing loss and sore throat  Eyes: Negative for redness and visual disturbance  Respiratory: Negative for cough and shortness of breath  Cardiovascular: Negative for chest pain, palpitations and leg swelling  Gastrointestinal: Positive for abdominal pain and nausea  Negative for vomiting  Endocrine: Negative for cold intolerance and heat intolerance  Genitourinary:        Per HPI   Musculoskeletal: Positive for back pain  Negative for arthralgias and myalgias  Skin: Negative for color change and rash     Neurological: Negative for dizziness, seizures and headaches  Hematological: Does not bruise/bleed easily  PHYSICAL EXAM:     /97   Pulse 68   Temp (!) 97 2 °F (36 2 °C)   Resp 20   LMP 08/02/2022 (Approximate)   SpO2 98%     General:  Healthy appearing female in no acute distress  Head:  Normocephalic, atraumatic  Eyes: no scleral icterus  ENMT: Nares patent, moist mucous membranes  Cardiovascular:  Regular rate  Respiratory:  Patient has unlabored respirations  Abdomen:  Abdomen nondistended  Musculoskeletal: Normal gait  Neurological: Grossly intact  Psych: Normal affect    LABS:     CBC:   Lab Results   Component Value Date    WBC 11 44 (H) 08/09/2022    HGB 15 4 08/09/2022    HCT 45 5 08/09/2022    MCV 94 08/09/2022     08/09/2022       BMP:   Lab Results   Component Value Date    CALCIUM 8 7 08/09/2022    K 3 5 08/09/2022    CO2 24 08/09/2022     08/09/2022    BUN 13 08/09/2022    CREATININE 1 32 (H) 08/09/2022       IMAGING:     CT ABDOMEN AND PELVIS WITH IV CONTRAST     INDICATION:   Right lower quadrant abdominal pain      COMPARISON:  February 14, 2019     TECHNIQUE:  CT examination of the abdomen and pelvis was performed  Axial, sagittal, and coronal 2D reformatted images were created from the source data and submitted for interpretation      Radiation dose length product (DLP) for this visit:  0797 mGy-cm   This examination, like all CT scans performed in the [facility], was performed utilizing techniques to minimize radiation dose exposure, including the use of iterative   reconstruction and automated exposure control      IV Contrast:  64 mL of iohexol (OMNIPAQUE)  Enteric Contrast:  Enteric contrast was not administered      FINDINGS:     ABDOMEN     LOWER CHEST:  Bibasilar atelectasis      LIVER/BILIARY TREE:  Elongated Riedel's lobe noted consistent with anatomic variation  No suspicious hepatic mass    No biliary dilatation      GALLBLADDER:  Gallbladder is surgically absent      SPLEEN:  Mildly enlarged at 13 cm, similar from previous examination  No splenic mass      PANCREAS:  Unremarkable      ADRENAL GLANDS:  Unremarkable      KIDNEYS/URETERS:  Proximal left ureteral calculus, 7 mm on image 80 of series 601 at the approximate L3-L4 disc space level  There is left-sided hydronephrosis and delayed left renal nephrogram   Normal right kidney      STOMACH AND BOWEL:  Surgical changes of prior Adilson-en-Y gastric bypass surgery  Nonobstructed loop of transverse colon seen within the supraumbilical epigastric hernia      APPENDIX:  A normal appendix was visualized      ABDOMINOPELVIC CAVITY:  No ascites  No pneumoperitoneum  No lymphadenopathy      VESSELS:  Unremarkable for patient's age      PELVIS     REPRODUCTIVE ORGANS:  Unremarkable for patient's age      URINARY BLADDER:  Unremarkable      ABDOMINAL WALL/INGUINAL REGIONS:  Supraumbilical ventral hernia  Hernia defect measures 3 4 cm  Hernia sac contains a nonobstructed loop of transverse colon and measures up to 16 cm in diameter  Small fat-containing supraumbilical hernia is present,   hernia sac measuring 3 3 cm      OSSEOUS STRUCTURES:  No acute fracture or destructive osseous lesion      IMPRESSION:     Proximal left ureteral calculus, 7 mm  This results in left hydronephrosis with delayed left renal nephrogram      Ventral hernia containing a nonobstructed loop of transverse colon  Surgical changes of prior Adilson-en-Y gastric bypass    ASSESSMENT:     55 y o  female with 7 mm left ureteral stone and UA concerning for infection  I discussed the findings with the patient and recommended stent placement with delayed stone treatment  The patient is amenable to this plan      PLAN:     To OR for cystoscopy, left retrograde pyelogram, left ureteral stent placement  If patient remains stable and does not develop fever she may go home  Will make arrangements for definitive stone treatment    Jamin Allen MD  [facility]

## 2022-08-10 ENCOUNTER — PREP FOR PROCEDURE (OUTPATIENT)
Dept: UROLOGY | Facility: MEDICAL CENTER | Age: 47
End: 2022-08-10

## 2022-08-10 DIAGNOSIS — N20.1 LEFT URETERAL STONE: Primary | ICD-10-CM

## 2022-08-10 LAB — BACTERIA UR CULT: NORMAL

## 2022-08-11 ENCOUNTER — PREP FOR PROCEDURE (OUTPATIENT)
Dept: UROLOGY | Facility: CLINIC | Age: 47
End: 2022-08-11

## 2022-08-11 DIAGNOSIS — N39.0 COMPLICATED UTI (URINARY TRACT INFECTION): Primary | ICD-10-CM

## 2022-08-11 NOTE — TELEPHONE ENCOUNTER
Spoke with patient and she is scheduled for 9/9 with VR at [facility]  She is aware the hospital will call the day prior with time of arrival, nothing to eat or drink after midnight, she will need a , and to hold blood thinning medications 7 days prior  She will have urine culture done 2 weeks priors       Mailed surgical pkt per patient request

## 2022-08-14 LAB
BACTERIA BLD CULT: NORMAL
BACTERIA BLD CULT: NORMAL

## 2022-08-16 NOTE — ANESTHESIA PREPROCEDURE EVALUATION
Procedure:  CYSTOSCOPY RETROGRADE PYELOGRAM WITH INSERTION STENT URETERAL (Left Bladder)    Relevant Problems   GI/HEPATIC   (+) Acute pancreatitis   (+) Fatty liver      /RENAL   (+) CHARLIE (acute kidney injury) ([facility] [address] )   (+) Hydronephrosis with urinary obstruction due to ureteral calculus        Physical Exam    Airway    Mallampati score: II  TM Distance: >3 FB  Neck ROM: full     Dental       Cardiovascular      Pulmonary      Other Findings        Anesthesia Plan  ASA Score- 3     Anesthesia Type- general with ASA Monitors  Additional Monitors:   Airway Plan: LMA  Plan Factors-Exercise tolerance (METS): >4 METS  Chart reviewed  Patient is not a current smoker  Patient did not smoke on day of surgery  Induction- intravenous  Postoperative Plan- Plan for postoperative opioid use  Planned trial extubation    Informed Consent- Anesthetic plan and risks discussed with patient  I personally reviewed this patient with the CRNA  Discussed and agreed on the Anesthesia Plan with the CRNA  Robinson Walsh

## 2022-08-22 DIAGNOSIS — N20.1 LEFT URETERAL STONE: ICD-10-CM

## 2022-08-23 NOTE — TELEPHONE ENCOUNTER
LM for patient in regards to surgery scheduling  I let patient know I had a cancellation for this Friday and was calling to see if she wanted to move her procedure up   Asked patient to call back to let me know

## 2022-08-24 NOTE — TELEPHONE ENCOUNTER
Returned call to patient   S/P CYSTOSCOPY RETROGRADE PYELOGRAM WITH INSERTION STENT URETERAL (Left Bladder) 8/9/22     Patient states pain is radiating from  Left back to down back leg  Pain was increased last night  After laying down it got better  This morning and during the day was okay  It started to increase again   Patient is not currently taking any medication   Taking ibuprofen 600 mg to help with pain  Urinary stream is okay  Frequency and urgency  Low grade temp 99 0  Burning with urination  Patient is drinking 4 bottles of water a day  Urine testing orders placed in [facility]  Patient will go for testing tomorrow , she also has preadmission testing scheduled as well  Reviewed what to expect with urethral stent  Reviewed comfort measures  prescribed medications with patient Oxybutynin and Tamsulosin/ otc heating pad and hydration   Avoiding Bladder irritants such as coffee,tea,soda,carbonated beverages  Limiting your alcohol intake  Avoiding constipation  Continue taking medications as prescribed  Reducing cigarette smoking  Advised patient monitor/contact our office with fever above 101 0, chills, decreased urination or unable to urinate, blood or clots in the urine  Health Call after hours protocol reviewed  Ed precautions reviewed   Patient verbilized understanding/ Agreement

## 2022-08-24 NOTE — TELEPHONE ENCOUNTER
Patient is calling for recommendations  Patient is having really bad radiating pain around her lower back  She is wanting to know if there is anything she can get for pain until her surgery  She is also has questions on what she should be looking for in terms of going to the ER      She is requesting a call back at [phone]

## 2022-08-25 RX ORDER — TAMSULOSIN HYDROCHLORIDE 0.4 MG/1
0.4 CAPSULE ORAL DAILY PRN
Qty: 30 CAPSULE | Refills: 0 | Status: SHIPPED | OUTPATIENT
Start: 2022-08-25 | End: 2022-08-25 | Stop reason: SDUPTHER

## 2022-08-25 RX ORDER — OXYBUTYNIN CHLORIDE 5 MG/1
5 TABLET ORAL 3 TIMES DAILY PRN
Qty: 90 TABLET | Refills: 0 | Status: SHIPPED | OUTPATIENT
Start: 2022-08-25 | End: 2022-08-30 | Stop reason: SDUPTHER

## 2022-08-25 RX ORDER — TAMSULOSIN HYDROCHLORIDE 0.4 MG/1
0.4 CAPSULE ORAL DAILY PRN
Qty: 30 CAPSULE | Refills: 0 | Status: SHIPPED | OUTPATIENT
Start: 2022-08-25 | End: 2022-08-30 | Stop reason: SDUPTHER

## 2022-08-25 NOTE — TELEPHONE ENCOUNTER
Pt called and stated she mixed up medication instructions and pt was taking tamsulosin (FLOMAX) 0 4 mg      3 times a day instead of taking once a day  Pt is in a lot of pain and urinary issues with just peeing very slow stream started yesterday  Pt went to pharmacy and was denied refill dure to mixing up instructions  Please advise what the pt can have done Pt has low grade fever 99 pt is normally around 97      Pt call [phone]

## 2022-08-25 NOTE — TELEPHONE ENCOUNTER
The only way around this would be if the patient paid cash to off-set the medication she took incorrectly  I spoke with the patient and she stating that the Tamsulosin was too soon to fill  [facility] to the problem  My suggestion, side-step the pharmacy insurance plan entirely  Pay cash, use [employer] pharmacy discount card and purchase the drug for $7 05 at [employer]  Cost for the same drug at [facility]/pharmacy would be $24 55  Patient wishes to have the script redirected to [facility] for better cost-savings      Script for the requested medication was queued and forwarded to the Advanced Practitioner covering the [facility] location for approval

## 2022-08-25 NOTE — TELEPHONE ENCOUNTER
Call placed to patient and spoke with her  Informed her of the recommendations of the CRNP  Pt is aware and understands the directions on how to take Flomax, but she is all out of this medication because she was taking it wrong and now the pharmacy will not refill this script because it is too soon  She is asking if this can be appealed by the office so she can get it refilled since this was helping to manager her symptoms and she is not scheduled for surgery until 9-9-2022  Pt will take the other medication as prescribed  ER precautions have been reviewed with the patient in the event her symptoms worsen  She is aware and  Verbalized her understandings

## 2022-08-25 NOTE — TELEPHONE ENCOUNTER
I spoke with patient to check and see if she is going for the urine testing and also Suhas's recommendations  Pt does need a refill on the oxybutynin and the tamsulosin  [facility] [place]     Thanks  Ariane Daugherty

## 2022-08-26 NOTE — TELEPHONE ENCOUNTER
Patient called she went to the [facility] the the script is not there  Can you please send it again?

## 2022-08-29 NOTE — TELEPHONE ENCOUNTER
Called pt to assess how she is feeling and make sure she picked up prescriptions that were called in (tamsulosin and oxybutynin)  Pt was advised to go for UA and UC to rule out UTI  It appears this has not yet taken place  Left msg on VM to return call to office  No comm consent on file

## 2022-08-30 RX ORDER — OXYBUTYNIN CHLORIDE 5 MG/1
5 TABLET ORAL 3 TIMES DAILY PRN
Qty: 90 TABLET | Refills: 0 | Status: ON HOLD | OUTPATIENT
Start: 2022-08-30 | End: 2022-10-30 | Stop reason: ALTCHOICE

## 2022-08-30 RX ORDER — TAMSULOSIN HYDROCHLORIDE 0.4 MG/1
0.4 CAPSULE ORAL DAILY PRN
Qty: 30 CAPSULE | Refills: 0 | Status: ON HOLD | OUTPATIENT
Start: 2022-08-30 | End: 2022-10-30 | Stop reason: ALTCHOICE

## 2022-08-30 NOTE — TELEPHONE ENCOUNTER
Returned call to patient  Patient states she has not been able to get Tamsulosin it was not sent to [facility] not [facility]   When she went to [facility] was not able to get prescription they did not have it on file  Patient uses [facility] for labs she will take lab slip there to get testing today  Patient states she is feeling better today  Reports that her flow is okay  Was better when she first started oxybutynin  Reports frequency, urgency , patient is hydrating  Contact [facility] pharmacy  Pharmacy is currently closed  Pharmacy hours ar 9am -6pm  Will attempt to contact pharmacy when open

## 2022-08-30 NOTE — TELEPHONE ENCOUNTER
Original note back on 8/25/22 clearly specified [facility] [place]      Both scripts were requeued, again, and directed to [facility] in [facility] and forwarded to the Advanced Practitioner covering the [facility] location for approval

## 2022-09-05 LAB
BACTERIA UR CULT: ABNORMAL
Lab: ABNORMAL
SL AMB ANTIMICROBIAL SUSCEPTIBILITY: ABNORMAL

## 2022-09-07 DIAGNOSIS — R39.89 SUSPECTED UTI: Primary | ICD-10-CM

## 2022-09-07 DIAGNOSIS — N20.1 LEFT URETERAL STONE: ICD-10-CM

## 2022-09-07 DIAGNOSIS — E66.01 MORBID OBESITY (HCC): Primary | ICD-10-CM

## 2022-09-07 NOTE — TELEPHONE ENCOUNTER
I spoke with pt and relayed this information to her  She will await a phone call on the next steps with the IV antibiotics

## 2022-09-07 NOTE — TELEPHONE ENCOUNTER
Patient called back in asking about her urine culture results and what that means prior to her surgery  She got the refill of the oxybutynin we sent, and saw her PCP and he was able to check her the Flomax  Patient was told we would call her today to speak with a surgery scheduler or a nurse  She has a OR booked with Dr Marcelo Romero on 9/9 and has not been reached out to  Please advise     Best call back is [phone]

## 2022-09-07 NOTE — TELEPHONE ENCOUNTER
Discussed with VR  Patient will require direct adimission to hospital prior to surgery for IV Abx treatment of her UTI  VR is in the process of getting this arranged  I called and LVOM on patient's cell  If she calls back, please just let her know the plan (can't do oral Abx, will need admission to hospital for IV  We will contact her with further instructions as arrangements are made)  Thanks!

## 2022-09-07 NOTE — TELEPHONE ENCOUNTER
Tiger text sent to VR regarding results  Due to allergies and susceptibilities, no PO Abx options       Awaiting recs from VR

## 2022-09-08 ENCOUNTER — HOSPITAL ENCOUNTER (OUTPATIENT)
Facility: HOSPITAL | Age: 47
Setting detail: OBSERVATION
Discharge: HOME | End: 2022-09-09
Attending: STUDENT IN AN ORGANIZED HEALTH CARE EDUCATION/TRAINING PROGRAM | Admitting: STUDENT IN AN ORGANIZED HEALTH CARE EDUCATION/TRAINING PROGRAM
Payer: COMMERCIAL

## 2022-09-08 ENCOUNTER — ANESTHESIA EVENT (OUTPATIENT)
Dept: PERIOP | Facility: HOSPITAL | Age: 47
End: 2022-09-08
Payer: COMMERCIAL

## 2022-09-08 DIAGNOSIS — N20.1 LEFT URETERAL STONE: ICD-10-CM

## 2022-09-08 RX ORDER — SODIUM CHLORIDE 9 MG/ML
125 INJECTION, SOLUTION INTRAVENOUS CONTINUOUS
Status: DISCONTINUED | OUTPATIENT
Start: 2022-09-08 | End: 2022-09-09 | Stop reason: HOSPADM

## 2022-09-08 RX ORDER — OXYBUTYNIN CHLORIDE 5 MG/1
5 TABLET ORAL 3 TIMES DAILY PRN
Status: DISCONTINUED | OUTPATIENT
Start: 2022-09-08 | End: 2022-09-09 | Stop reason: HOSPADM

## 2022-09-08 RX ORDER — TAMSULOSIN HYDROCHLORIDE 0.4 MG/1
0.4 CAPSULE ORAL
Status: DISCONTINUED | OUTPATIENT
Start: 2022-09-08 | End: 2022-09-09 | Stop reason: HOSPADM

## 2022-09-08 RX ORDER — FLUOXETINE HYDROCHLORIDE 20 MG/1
20 CAPSULE ORAL DAILY
Status: DISCONTINUED | OUTPATIENT
Start: 2022-09-09 | End: 2022-09-09 | Stop reason: HOSPADM

## 2022-09-08 RX ORDER — HYDROCODONE BITARTRATE AND ACETAMINOPHEN 5; 325 MG/1; MG/1
1 TABLET ORAL EVERY 4 HOURS PRN
Status: DISCONTINUED | OUTPATIENT
Start: 2022-09-08 | End: 2022-09-09 | Stop reason: HOSPADM

## 2022-09-08 RX ORDER — ALPRAZOLAM 0.25 MG/1
0.25 TABLET ORAL 3 TIMES DAILY PRN
Status: DISCONTINUED | OUTPATIENT
Start: 2022-09-08 | End: 2022-09-09 | Stop reason: HOSPADM

## 2022-09-08 RX ORDER — PHENAZOPYRIDINE HYDROCHLORIDE 100 MG/1
100 TABLET, FILM COATED ORAL 3 TIMES DAILY PRN
Status: DISCONTINUED | OUTPATIENT
Start: 2022-09-08 | End: 2022-09-09 | Stop reason: HOSPADM

## 2022-09-08 RX ORDER — DIPHENHYDRAMINE HYDROCHLORIDE 50 MG/ML
25 INJECTION INTRAMUSCULAR; INTRAVENOUS EVERY 6 HOURS PRN
Status: DISCONTINUED | OUTPATIENT
Start: 2022-09-08 | End: 2022-09-09 | Stop reason: HOSPADM

## 2022-09-08 RX ADMIN — TAMSULOSIN HYDROCHLORIDE 0.4 MG: 0.4 CAPSULE ORAL at 22:01

## 2022-09-08 RX ADMIN — SODIUM CHLORIDE 125 ML/HR: 0.9 INJECTION, SOLUTION INTRAVENOUS at 19:56

## 2022-09-08 RX ADMIN — HYDROCODONE BITARTRATE AND ACETAMINOPHEN 1 TABLET: 5; 325 TABLET ORAL at 20:07

## 2022-09-08 RX ADMIN — OXYBUTYNIN CHLORIDE 5 MG: 5 TABLET ORAL at 20:07

## 2022-09-08 RX ADMIN — ERTAPENEM SODIUM 1000 MG: 1 INJECTION, POWDER, LYOPHILIZED, FOR SOLUTION INTRAMUSCULAR; INTRAVENOUS at 20:00

## 2022-09-08 NOTE — ANESTHESIA PREPROCEDURE EVALUATION
Procedure:  CYSTO USCOPE W/ LASER, RETROGRADE AND STENT (Left Bladder)    Relevant Problems   GI/HEPATIC   (+) Acute pancreatitis   (+) Bariatric surgery status   (+) Fatty liver      /RENAL   (+) CHARLIE (acute kidney injury) ([facility] [address] )   (+) Hydronephrosis with urinary obstruction due to ureteral calculus      PULMONARY   (+) Asthma      Other   (+) Alcohol abuse   (+) Morbid obesity (HCC)        Physical Exam    Airway    Mallampati score: II  TM Distance: >3 FB  Neck ROM: full     Dental       Cardiovascular  Rhythm: regular, Rate: normal, Cardiovascular exam normal    Pulmonary  Pulmonary exam normal Breath sounds clear to auscultation,     Other Findings        Anesthesia Plan  ASA Score- 3     Anesthesia Type- general with ASA Monitors  Additional Monitors:   Airway Plan: ETT  Comment: Pt awakens from anesthesia trying to pull out tubes, etc  Plan Factors-    Chart reviewed  Existing labs reviewed  Patient summary reviewed  Induction- intravenous  Postoperative Plan- Plan for postoperative opioid use  Informed Consent- Anesthetic plan and risks discussed with patient

## 2022-09-08 NOTE — TELEPHONE ENCOUNTER
M for pt to let her know I spoke with Dr Blair and she said the hospital will call her once there is a bed to have her come in for the IV antibiotics  Let her know we are here until 4:30pm to call if she has not heard by then

## 2022-09-08 NOTE — TELEPHONE ENCOUNTER
Patient is concerned that she hasn't heard back about the IV antibiotics that she was to have today due to her surgery tomorrow       Patient would like a call back to discuss and would like an update     Patient can be reached at [phone]

## 2022-09-09 ENCOUNTER — TELEPHONE (OUTPATIENT)
Dept: OTHER | Facility: HOSPITAL | Age: 47
End: 2022-09-09

## 2022-09-09 ENCOUNTER — ANESTHESIA (OUTPATIENT)
Dept: PERIOP | Facility: HOSPITAL | Age: 47
End: 2022-09-09
Payer: COMMERCIAL

## 2022-09-09 ENCOUNTER — APPOINTMENT (OUTPATIENT)
Dept: RADIOLOGY | Facility: HOSPITAL | Age: 47
End: 2022-09-09
Payer: COMMERCIAL

## 2022-09-09 VITALS
OXYGEN SATURATION: 95 % | DIASTOLIC BLOOD PRESSURE: 71 MMHG | TEMPERATURE: 96.1 F | RESPIRATION RATE: 15 BRPM | HEART RATE: 62 BPM | SYSTOLIC BLOOD PRESSURE: 122 MMHG

## 2022-09-09 LAB
EXT PREGNANCY TEST URINE: NEGATIVE
EXT. CONTROL: NORMAL

## 2022-09-09 PROCEDURE — 87205 SMEAR GRAM STAIN: CPT | Performed by: STUDENT IN AN ORGANIZED HEALTH CARE EDUCATION/TRAINING PROGRAM

## 2022-09-09 PROCEDURE — 99024 POSTOP FOLLOW-UP VISIT: CPT | Performed by: PHYSICIAN ASSISTANT

## 2022-09-09 PROCEDURE — 87070 CULTURE OTHR SPECIMN AEROBIC: CPT | Performed by: STUDENT IN AN ORGANIZED HEALTH CARE EDUCATION/TRAINING PROGRAM

## 2022-09-09 PROCEDURE — 82360 CALCULUS ASSAY QUANT: CPT | Performed by: STUDENT IN AN ORGANIZED HEALTH CARE EDUCATION/TRAINING PROGRAM

## 2022-09-09 PROCEDURE — C2617 STENT, NON-COR, TEM W/O DEL: HCPCS | Performed by: STUDENT IN AN ORGANIZED HEALTH CARE EDUCATION/TRAINING PROGRAM

## 2022-09-09 PROCEDURE — C1758 CATHETER, URETERAL: HCPCS | Performed by: STUDENT IN AN ORGANIZED HEALTH CARE EDUCATION/TRAINING PROGRAM

## 2022-09-09 PROCEDURE — C1769 GUIDE WIRE: HCPCS | Performed by: STUDENT IN AN ORGANIZED HEALTH CARE EDUCATION/TRAINING PROGRAM

## 2022-09-09 PROCEDURE — 74420 UROGRAPHY RTRGR +-KUB: CPT

## 2022-09-09 PROCEDURE — C1894 INTRO/SHEATH, NON-LASER: HCPCS | Performed by: STUDENT IN AN ORGANIZED HEALTH CARE EDUCATION/TRAINING PROGRAM

## 2022-09-09 PROCEDURE — 99024 POSTOP FOLLOW-UP VISIT: CPT | Performed by: STUDENT IN AN ORGANIZED HEALTH CARE EDUCATION/TRAINING PROGRAM

## 2022-09-09 PROCEDURE — 52356 CYSTO/URETERO W/LITHOTRIPSY: CPT | Performed by: STUDENT IN AN ORGANIZED HEALTH CARE EDUCATION/TRAINING PROGRAM

## 2022-09-09 DEVICE — INLAY OPTIMA URETERAL STENT W/O GUIDEWIRE
Type: IMPLANTABLE DEVICE | Site: URETER | Status: FUNCTIONAL
Brand: BARD® INLAY OPTIMA® URETERAL STENT

## 2022-09-09 RX ORDER — NEOSTIGMINE METHYLSULFATE 1 MG/ML
INJECTION INTRAVENOUS AS NEEDED
Status: DISCONTINUED | OUTPATIENT
Start: 2022-09-09 | End: 2022-09-09

## 2022-09-09 RX ORDER — ONDANSETRON 2 MG/ML
INJECTION INTRAMUSCULAR; INTRAVENOUS AS NEEDED
Status: DISCONTINUED | OUTPATIENT
Start: 2022-09-09 | End: 2022-09-09

## 2022-09-09 RX ORDER — MAGNESIUM HYDROXIDE 1200 MG/15ML
LIQUID ORAL AS NEEDED
Status: DISCONTINUED | OUTPATIENT
Start: 2022-09-09 | End: 2022-09-09 | Stop reason: HOSPADM

## 2022-09-09 RX ORDER — GLYCOPYRROLATE 0.2 MG/ML
INJECTION INTRAMUSCULAR; INTRAVENOUS AS NEEDED
Status: DISCONTINUED | OUTPATIENT
Start: 2022-09-09 | End: 2022-09-09

## 2022-09-09 RX ORDER — DEXMEDETOMIDINE HYDROCHLORIDE 100 UG/ML
INJECTION, SOLUTION INTRAVENOUS AS NEEDED
Status: DISCONTINUED | OUTPATIENT
Start: 2022-09-09 | End: 2022-09-09

## 2022-09-09 RX ORDER — ONDANSETRON 2 MG/ML
4 INJECTION INTRAMUSCULAR; INTRAVENOUS ONCE AS NEEDED
Status: DISCONTINUED | OUTPATIENT
Start: 2022-09-09 | End: 2022-09-09 | Stop reason: HOSPADM

## 2022-09-09 RX ORDER — METOCLOPRAMIDE HYDROCHLORIDE 5 MG/ML
10 INJECTION INTRAMUSCULAR; INTRAVENOUS EVERY 6 HOURS PRN
Status: DISCONTINUED | OUTPATIENT
Start: 2022-09-09 | End: 2022-09-09 | Stop reason: HOSPADM

## 2022-09-09 RX ORDER — DEXAMETHASONE SODIUM PHOSPHATE 10 MG/ML
INJECTION, SOLUTION INTRAMUSCULAR; INTRAVENOUS AS NEEDED
Status: DISCONTINUED | OUTPATIENT
Start: 2022-09-09 | End: 2022-09-09

## 2022-09-09 RX ORDER — PROPOFOL 10 MG/ML
INJECTION, EMULSION INTRAVENOUS AS NEEDED
Status: DISCONTINUED | OUTPATIENT
Start: 2022-09-09 | End: 2022-09-09

## 2022-09-09 RX ORDER — FENTANYL CITRATE/PF 50 MCG/ML
50 SYRINGE (ML) INJECTION
Status: DISCONTINUED | OUTPATIENT
Start: 2022-09-09 | End: 2022-09-09 | Stop reason: HOSPADM

## 2022-09-09 RX ORDER — MIDAZOLAM HYDROCHLORIDE 2 MG/2ML
INJECTION, SOLUTION INTRAMUSCULAR; INTRAVENOUS AS NEEDED
Status: DISCONTINUED | OUTPATIENT
Start: 2022-09-09 | End: 2022-09-09

## 2022-09-09 RX ORDER — ONDANSETRON 2 MG/ML
4 INJECTION INTRAMUSCULAR; INTRAVENOUS ONCE
Status: DISCONTINUED | OUTPATIENT
Start: 2022-09-09 | End: 2022-09-09

## 2022-09-09 RX ORDER — FENTANYL CITRATE 50 UG/ML
INJECTION, SOLUTION INTRAMUSCULAR; INTRAVENOUS AS NEEDED
Status: DISCONTINUED | OUTPATIENT
Start: 2022-09-09 | End: 2022-09-09

## 2022-09-09 RX ORDER — ROCURONIUM BROMIDE 10 MG/ML
INJECTION, SOLUTION INTRAVENOUS AS NEEDED
Status: DISCONTINUED | OUTPATIENT
Start: 2022-09-09 | End: 2022-09-09

## 2022-09-09 RX ORDER — HYDROMORPHONE HCL/PF 1 MG/ML
0.5 SYRINGE (ML) INJECTION EVERY 4 HOURS PRN
Status: DISCONTINUED | OUTPATIENT
Start: 2022-09-09 | End: 2022-09-09 | Stop reason: HOSPADM

## 2022-09-09 RX ADMIN — GLYCOPYRROLATE 0.6 MG: 0.2 INJECTION, SOLUTION INTRAMUSCULAR; INTRAVENOUS at 14:07

## 2022-09-09 RX ADMIN — FENTANYL CITRATE 50 MCG: 50 INJECTION, SOLUTION INTRAMUSCULAR; INTRAVENOUS at 14:45

## 2022-09-09 RX ADMIN — SODIUM CHLORIDE 125 ML/HR: 0.9 INJECTION, SOLUTION INTRAVENOUS at 04:33

## 2022-09-09 RX ADMIN — MIDAZOLAM 2 MG: 1 INJECTION INTRAMUSCULAR; INTRAVENOUS at 13:15

## 2022-09-09 RX ADMIN — ROCURONIUM BROMIDE 40 MG: 50 INJECTION, SOLUTION INTRAVENOUS at 13:24

## 2022-09-09 RX ADMIN — FENTANYL CITRATE 50 MCG: 50 INJECTION, SOLUTION INTRAMUSCULAR; INTRAVENOUS at 14:37

## 2022-09-09 RX ADMIN — PHENAZOPYRIDINE HYDROCHLORIDE 100 MG: 100 TABLET ORAL at 08:00

## 2022-09-09 RX ADMIN — SODIUM CHLORIDE: 0.9 INJECTION, SOLUTION INTRAVENOUS at 14:22

## 2022-09-09 RX ADMIN — NEOSTIGMINE METHYLSULFATE 3 MG: 1 INJECTION INTRAVENOUS at 14:07

## 2022-09-09 RX ADMIN — FLUOXETINE 20 MG: 20 CAPSULE ORAL at 08:00

## 2022-09-09 RX ADMIN — HYDROCODONE BITARTRATE AND ACETAMINOPHEN 1 TABLET: 5; 325 TABLET ORAL at 04:34

## 2022-09-09 RX ADMIN — DEXAMETHASONE SODIUM PHOSPHATE 10 MG: 10 INJECTION, SOLUTION INTRAMUSCULAR; INTRAVENOUS at 13:30

## 2022-09-09 RX ADMIN — PROPOFOL 200 MG: 10 INJECTION, EMULSION INTRAVENOUS at 13:24

## 2022-09-09 RX ADMIN — IOHEXOL 10 ML: 240 INJECTION, SOLUTION INTRATHECAL; INTRAVASCULAR; INTRAVENOUS; ORAL at 13:45

## 2022-09-09 RX ADMIN — DEXMEDETOMIDINE HCL 8 MCG: 100 INJECTION INTRAVENOUS at 14:04

## 2022-09-09 RX ADMIN — METOCLOPRAMIDE 10 MG: 5 INJECTION, SOLUTION INTRAMUSCULAR; INTRAVENOUS at 15:32

## 2022-09-09 RX ADMIN — SODIUM CHLORIDE 125 ML/HR: 0.9 INJECTION, SOLUTION INTRAVENOUS at 11:57

## 2022-09-09 RX ADMIN — DEXMEDETOMIDINE HCL 8 MCG: 100 INJECTION INTRAVENOUS at 13:28

## 2022-09-09 RX ADMIN — FENTANYL CITRATE 100 MCG: 50 INJECTION INTRAMUSCULAR; INTRAVENOUS at 13:19

## 2022-09-09 RX ADMIN — OXYBUTYNIN CHLORIDE 5 MG: 5 TABLET ORAL at 04:35

## 2022-09-09 RX ADMIN — HYDROMORPHONE HYDROCHLORIDE 0.5 MG: 1 INJECTION, SOLUTION INTRAMUSCULAR; INTRAVENOUS; SUBCUTANEOUS at 09:37

## 2022-09-09 RX ADMIN — ERTAPENEM SODIUM 1000 MG: 1 INJECTION, POWDER, LYOPHILIZED, FOR SOLUTION INTRAMUSCULAR; INTRAVENOUS at 13:15

## 2022-09-09 RX ADMIN — ONDANSETRON 4 MG: 2 INJECTION INTRAMUSCULAR; INTRAVENOUS at 14:04

## 2022-09-09 NOTE — PLAN OF CARE
Problem: PAIN - ADULT  Goal: Verbalizes/displays adequate comfort level or baseline comfort level  Description: Interventions:  - Encourage patient to monitor pain and request assistance  - Assess pain using appropriate pain scale  - Administer analgesics based on type and severity of pain and evaluate response  - Implement non-pharmacological measures as appropriate and evaluate response  - Consider cultural and social influences on pain and pain management  - Notify physician/advanced practitioner if interventions unsuccessful or patient reports new pain  9/9/2022 1634 by Gurpreet Quiñones RN  Outcome: Adequate for Discharge  9/9/2022 1632 by Gurpreet Quiñones RN  Outcome: Adequate for Discharge

## 2022-09-09 NOTE — DISCHARGE INSTRUCTIONS
Ureteroscopy   WHAT YOU NEED TO KNOW:   A ureteroscopy is a procedure to examine in the inside of your urinary tract  The urinary tract your urethra, bladder, ureters, and kidneys  A ureteroscope is a small, thin tube with a light and camera on the end  Ureteroscopy can help identify problems in your urinary tract, such as kidney stones  DISCHARGE INSTRUCTIONS:   Call your doctor if:   You have a fever  You cannot urinate  You have blood in your urine  You are vomiting  You have pain in your abdomen or side  You have questions or concerns about your condition or care  Medicines: You may need any of the following:  NSAIDs , such as ibuprofen, help decrease swelling, pain, and fever  NSAIDs can cause stomach bleeding or kidney problems in certain people  If you take blood thinner medicine, always ask your healthcare provider if NSAIDs are safe for you  Always read the medicine label and follow directions  Antibiotics  may be given to prevent an infection  Take your medicine as directed  Contact your healthcare provider if you think your medicine is not helping or if you have side effects  Tell him or her if you are allergic to any medicine  Keep a list of the medicines, vitamins, and herbs you take  Include the amounts, and when and why you take them  Bring the list or the pill bottles to follow-up visits  Carry your medicine list with you in case of an emergency  Drink liquids as directed:  Liquids can help prevent kidney stones and urinary tract infections  Drink water and limit the amount of caffeine you drink  Caffeine may be found in coffee, tea, soda, sports drinks, and foods  Ask your healthcare provider how much liquid to drink each day and which liquids are best for you  Follow up with your healthcare provider as directed:  Write down your questions so you remember to ask them during your visits    © Copyright [employer] 2022 Information is for End User's use only and may not be sold, redistributed or otherwise used for commercial purposes  All illustrations and images included in CareNotes® are the copyrighted property of A D A M [employer] Inc  or [employer]  The above information is an  only  It is not intended as medical advice for individual conditions or treatments  Talk to your doctor, nurse or pharmacist before following any medical regimen to see if it is safe and effective for you

## 2022-09-09 NOTE — ANESTHESIA POSTPROCEDURE EVALUATION
Post-Op Assessment Note    CV Status:  Stable    Pain management: adequate     Mental Status:  Alert and awake   Hydration Status:  Euvolemic   PONV Controlled:  Controlled   Airway Patency:  Patent      Post Op Vitals Reviewed: Yes      Staff: Anesthesiologist         No complications documented      /61 (09/09/22 1443)    Temp      Pulse 62 (09/09/22 1443)   Resp 12 (09/09/22 1443)    SpO2 100 % (09/09/22 1443)

## 2022-09-09 NOTE — TELEPHONE ENCOUNTER
Pt is POD#0 left #5  Pls assist in scheduling cysto stent removal with Dr Ethan Reeves in 7-10 days       Thanks

## 2022-09-09 NOTE — UTILIZATION REVIEW
Initial Clinical Review    Admission: Date/Time/Statement:   Admission Orders (From admission, onward)     Ordered        09/09/22 1104  Place in Observation  Once                      Orders Placed This Encounter   Procedures    Place in Observation     Standing Status:   Standing     Number of Occurrences:   1     Order Specific Question:   Level of Care     Answer:   Med Surg [16]     Initial Presentation: 52 y o  female  Directly admitted    For phillip operative  FAITH  Plan OR   9/9  For  Cysto  PMH is  Anxiety/depression, alcohol abuse and recent diagnosis left ureteral stone and hydronephrosis  Admit  Observation with Left ureteral calculus, hydronephrosis and renal colic and plan is  FAITH, IVF, pain control, antiemetics and  OR 9/9              Wt Readings from Last 1 Encounters:   08/09/22 119 kg (262 lb)     Additional Vital Signs:   96 7 °F (35 9 °C) Abnormal  60 18 133/64 100 % None (Room air) Lying    09/08/22 2236 96 5 °F (35 8 °C) Abnormal  69 -- 106/52 97 % None (Room air) Lying   09/08/22 1825 96 5 °F (35 8 °C) Abnormal  72 -- 159/83 99 % None (Room air) Sitting       Pertinent Labs/Diagnostic Test Results:   FL < 1 hour    (Results Pending)                         Present on Admission:   Left ureteral stone      Admitting Diagnosis: Suspected UTI [R39 89]  Age/Sex: 52 y o  female  Admission Orders:  Scheduled Medications:  ertapenem, 1,000 mg, Intravenous, Q24H  ertapenem, 1,000 mg, Intravenous, On Call To OR  FLUoxetine, 20 mg, Oral, Daily  tamsulosin, 0 4 mg, Oral, HS      Continuous IV Infusions:  sodium chloride, 125 mL/hr, Intravenous, Continuous      PRN Meds:  ALPRAZolam, 0 25 mg, Oral, TID PRN  diphenhydrAMINE, 25 mg, Intravenous, Q6H PRN  HYDROcodone-acetaminophen, 1 tablet, Oral, Q4H PRN  HYDROmorphone, 0 5 mg, Intravenous, Q4H PRN  oxybutynin, 5 mg, Oral, TID PRN  phenazopyridine, 100 mg, Oral, TID PRN          Network Utilization Review Department  ATTENTION: Please call with any questions or concerns to [phone] and carefully listen to the prompts so that you are directed to the right person  All voicemails are confidential   Kira Dhiraj all requests for admission clinical reviews, approved or denied determinations and any other requests to dedicated fax number below belonging to the campus where the patient is receiving treatment   List of dedicated fax numbers for the Facilities:  [address] DENIALS (Administrative/Medical Necessity) [phone]   [address] (Maternity/NICU/Pediatrics) [phone]   [address] [place] [address]  [address] Medical [place] [address] [address] [address] [address] [place] [address] [phone]

## 2022-09-09 NOTE — DISCHARGE SUMMARY
Discharge Summary - Epi Alfaro 52 y o  female MRN: [id number]    Unit/Bed#: E2 -95 Encounter: [id number]    Admission Date: 9/8/2022     Discharge Date: 09/09/22    HPI: Epi Alfaro is a 52 y o  female who presented for cystoscopy, ureteroscopy with lithotripsy holmium laser, retrograde pyelogram and left ureteral stent exchange by Dr Reynold Peoples  Procedure(s):  CYSTO USCOPE W/ LASER, RETROGRADE AND STENT exchange, basket stone extraction  Surgeon(s):  Shu Yousif MD  9/9/2022    Hospital Course:  Patient presented to [facility] on 09/08/2022 for preoperative IV antibiotics  She was taken to the OR today with Dr Reynold Peoples for cystoscopy, ureteroscopy with lithotripsy holmium laser, retrograde pyelogram and left ureteral stent exchange  And tolerated the procedure well  At the conclusion of the case she was extubated in the OR and transferred to the PACU in stable condition  She was later transferred to the medical-surgical unit where she continued to recover without complication  Patient requests discharge home today  We stable and Dr Eva Contreras has cleared for discharge to home today  She will follow-up in the office in 7-10 days with Dr Reynold Peoples for cysto stent removal      Discharge Diagnosis: Left ureteral stone    Condition at Discharge: good     Discharge Medications:  See after visit summary for reconciled discharge medications provided to patient and family  Patient was discharged home on home medications  Discharge instructions/Information to patient and family:   See after visit summary for information provided to patient and family  Provisions for Follow-Up Care:  See after visit summary for information related to follow-up care and any pertinent home health orders  Disposition: Home    Planned Readmission: No    Discharge Statement   I spent 15 minutes discharging the patient  This time was spent on the day of discharge   I had direct contact with the patient on the day of discharge  Additional documentation is required if more than 30 minutes were spent on discharge       Signature:   Tiesha Granado  Date: 9/9/2022 Time: 3:22 PM

## 2022-09-09 NOTE — H&P
HISTORY AND PHYSICAL      Patient Name: Lopez Bauer  Patient MRN:  [id number]  Admission Date:  9/8/2022  6:22 PM  Attending Provider:  Rhonda Moore MD  Service: Urology                                Chief Complaint  Flank pain    HPI   Violet Rodriguez is a 52year old female with a past medical history of anxiety, elevated BP, depression, fatty liver, history of alcohol abuse and recently diagnosed August 9, 2022 with a 7mm left ureteral stone with hydronephrosis  Pateint was admitted and had surgery with left ureteral stent placement  Patient presented last evening as a direct admission for preoperative antibiotics  Patient will be going to the OR today with Dr Tin Thomas for cysto, ureteroscope with laser lithotripsy and stent exchange  Review of Systems   Constitutional: Negative  HENT: Negative  Respiratory: Negative  Cardiovascular: Negative  Gastrointestinal: Negative  Genitourinary: Positive for flank pain (left )  Pink tinged urine   Allergic/Immunologic: Negative  Neurological: Negative  Psychiatric/Behavioral: Negative  Chart Review   The statement should read:  The following portions of the patients history were reviewed and updated as appropriate: allergies, current medications, past family history, past medical history, past social history, past surgical history and problem list    Vital Signs & PE  /64 (BP Location: Left arm)   Pulse 60   Temp (!) 96 7 °F (35 9 °C) (Temporal)   Resp 18   LMP 08/31/2022 (Exact Date)   SpO2 100%   General appearance: alert and oriented, in no acute distress, appears stated age and cooperative  Neck: no adenopathy, no carotid bruit, no JVD, supple, symmetrical, trachea midline and thyroid not enlarged, symmetric, no tenderness/mass/nodules  Back: left CVA tenderness   Lungs: clear to auscultation bilaterally  Heart: regular rate and rhythm, S1, S2 normal, no murmur, click, rub or gallop  Abdomen: soft, non-tender; bowel sounds normal; no masses,  no organomegaly  Pulses: 2+ and symmetric  Skin: Skin color, texture, turgor normal  No rashes or lesions    Laboratory Studies  Lab Results   Component Value Date    HGBA1C 5 0 02/05/2019    K 3 5 08/09/2022     08/09/2022    CO2 24 08/09/2022    CO2 27 02/03/2019    CREATININE 1 32 (H) 08/09/2022    BUN 13 08/09/2022    MG 1 8 02/06/2019     Lab Results   Component Value Date    WBC 11 44 (H) 08/09/2022    RBC 4 82 08/09/2022    HGB 15 4 08/09/2022    HCT 45 5 08/09/2022    MCV 94 08/09/2022    MCH 32 0 08/09/2022    RDW 12 5 08/09/2022     08/09/2022           Imaging and Other Studies  )No results found  Assessment/Plan     Problems  Patient Active Problem List   Diagnosis    Acute pancreatitis    Cholelithiasis    Ventral hernia without obstruction or gangrene    Morbid obesity ([address] )    Alcohol abuse    Complicated UTI (urinary tract infection)    Hypokalemia    Alcohol withdrawal delirium, acute, hyperactive ([address] )    Diarrhea due to malabsorption    Colon cancer screening    Fatty liver    Left ureteral stone    CHARLIE (acute kidney injury) ([address] )    Hydronephrosis with urinary obstruction due to ureteral calculus    Asthma    Bariatric surgery status       Left Ureteral calculus  Hydronephrosis  Renal Colic      Plan:  1  Admission for pre op antibiotics   2  IVFs   3  Flomax  4  Analgesia/Narcotics   5  Anti-emetics   6  OR today for # 5     DVT Prophylaxis  ambulate ad pablo    Communication  Explained risk, benefits and potential complications of ureteroscopic stone extraction  Consent by surgeon      Level of Care  Admit to [address]

## 2022-09-09 NOTE — OP NOTE
OPERATIVE REPORT     Name: Allison Leonardo     MRN: [id number]  Date: 9/9/2022 Time: 3:11 PM     Location:       [facility]   Date of Operation:  9/9/2022   Service: Urology     Pre-op Diagnosis:  Left ureteral calculus    Post-op Diagnosis:  Same     Operation:     Cystoscopy   Left ureteroscopy laser lithotripsy basket stone extraction  Left Retrograde Pyelogram   Left ureteral stent placement (6Fr x 26 cm JJ stent)  Fluoroscopic Guidance    Surgeon:  Leon Peterson MD  Assistants:  None      Anesthesia:  General   Drains: 6Fr x 26 cm JJ stent  Estimated Blood Loss:  Minimal   Urine Output:  N/A   Specimens: Left ureteral stone for analysis and culture  Apparent Intraoperative Complications:  None   Patient Condition:  Stable   Disposition:  PACU  Antibiotic Prophylaxis:  Ertapenem     Indications:     52 y o  female with a left ureteral stone who underwent left stent placement August 9, 2022 prep obstructing ureteral stone and concern for infection  She presents for definitive ureteroscopic stone extraction  Risks, benefits and alternatives to treatment were discussed with the patient who elected to proceed with the operation  Findings:    - Impacted ureteral stone identified in the proximal ureter with significant associated inflammation  - successful removal all significant stone fragments    Operative Report:    The patient was identified, and the procedure verified  After induction of anesthesia the patient was prepped and draped in the dorsolithotomy position  ? A  film was obtained  A 22 5 Fr rigid cystoscope was passed per urethra to the bladder revealing the above findings  A stent grasper was used to grasp the distal end of the stent and bring it just outside of the urethral meatus  A SoloPlus wire was passed through the stent up to the renal pelvis under fluoroscopic guidance  A disposable ureteroscope was passed up to the level of the stone    A 2nd Solo Plus wire was passed through the scope up to the renal pelvis  A 12/14Fr x 35cm ureteral access sheath was passed up to the proximal ureter to the level of the stone  A 272 micron holmium laser fiber was used to fragment the stone using fragmenting technique  A skylight basket was used to remove all fragments greater than 2 mm  Systematic renoscopy demonstrated no residual stones  A retrograde pyelogram was shot through the flexible ureteroscope  The flexible ureteroscope was backed down the ureter in tandem with the ureteral access sheath  Inspection of the ureter demonstrated no residual stone fragments  A 6Fr ?x 26cm? JJ stent was placed in standard retrograde fashion under fluoroscopic guidance  ? Upon removal of the wire an adequate curl was noted in the renal pelvis and the bladder on fluoroscopy  The bladder was emptied  The patient was awoken from anesthesia?and transferred to PACU in satisfactory condition  ? Left retrograde pyelogram interpretation: normal course of the ureter, normal caliber, no filling defects, smooth contours of the renal pelvis, sharp calyces  I was present for the entire procedure       Plan:    Discharged home  Return to clinic in 7-10 days for cysto stent removal in office  Will give gentamicin IM for prophylaxis on the day of stent removal given patient allergies        SIGNATURE: Jane Reyes MD  DATE: 9/9/2022  TIME: 3:11 PM

## 2022-09-09 NOTE — PLAN OF CARE
Problem: PAIN - ADULT  Goal: Verbalizes/displays adequate comfort level or baseline comfort level  Description: Interventions:  - Encourage patient to monitor pain and request assistance  - Assess pain using appropriate pain scale  - Administer analgesics based on type and severity of pain and evaluate response  - Implement non-pharmacological measures as appropriate and evaluate response  - Consider cultural and social influences on pain and pain management  - Notify physician/advanced practitioner if interventions unsuccessful or patient reports new pain  Outcome: Progressing     Problem: INFECTION - ADULT  Goal: Absence or prevention of progression during hospitalization  Description: INTERVENTIONS:  - Assess and monitor for signs and symptoms of infection  - Monitor lab/diagnostic results  - Monitor all insertion sites, i e  indwelling lines, tubes, and drains  - Monitor endotracheal if appropriate and nasal secretions for changes in amount and color  - Correll appropriate cooling/warming therapies per order  - Administer medications as ordered  - Instruct and encourage patient and family to use good hand hygiene technique  - Identify and instruct in appropriate isolation precautions for identified infection/condition  Outcome: Progressing     Problem: SAFETY ADULT  Goal: Patient will remain free of falls  Description: INTERVENTIONS:  - Educate patient/family on patient safety including physical limitations  - Instruct patient to call for assistance with activity   - Consult OT/PT to assist with strengthening/mobility   - Keep Call bell within reach  - Keep bed low and locked with side rails adjusted as appropriate  - Keep care items and personal belongings within reach  - Initiate and maintain comfort rounds  - Make Fall Risk Sign visible to staff  - Offer Toileting every  Hours, in advance of need  - Initiate/Maintain alarm  - Obtain necessary fall risk management equipment  - Apply yellow socks and bracelet for high fall risk patients  - Consider moving patient to room near nurses station  Outcome: Progressing  Goal: Maintain or return to baseline ADL function  Description: INTERVENTIONS:  -  Assess patient's ability to carry out ADLs; assess patient's baseline for ADL function and identify physical deficits which impact ability to perform ADLs (bathing, care of mouth/teeth, toileting, grooming, dressing, etc )  - Assess/evaluate cause of self-care deficits   - Assess range of motion  - Assess patient's mobility; develop plan if impaired  - Assess patient's need for assistive devices and provide as appropriate  - Encourage maximum independence but intervene and supervise when necessary  - Involve family in performance of ADLs  - Assess for home care needs following discharge   - Consider OT consult to assist with ADL evaluation and planning for discharge  - Provide patient education as appropriate  Outcome: Progressing     Problem: DISCHARGE PLANNING  Goal: Discharge to home or other facility with appropriate resources  Description: INTERVENTIONS:  - Identify barriers to discharge w/patient and caregiver  - Arrange for needed discharge resources and transportation as appropriate  - Identify discharge learning needs (meds, wound care, etc )  - Arrange for interpretive services to assist at discharge as needed  - Refer to Case Management Department for coordinating discharge planning if the patient needs post-hospital services based on physician/advanced practitioner order or complex needs related to functional status, cognitive ability, or social support system  Outcome: Progressing     Problem: Knowledge Deficit  Goal: Patient/family/caregiver demonstrates understanding of disease process, treatment plan, medications, and discharge instructions  Description: Complete learning assessment and assess knowledge base    Interventions:  - Provide teaching at level of understanding  - Provide teaching via preferred learning methods  Outcome: Progressing     Problem: GENITOURINARY - ADULT  Goal: Maintains or returns to baseline urinary function  Description: INTERVENTIONS:  - Assess urinary function  - Encourage oral fluids to ensure adequate hydration if ordered  - Administer IV fluids as ordered to ensure adequate hydration  - Administer ordered medications as needed  - Offer frequent toileting  - Follow urinary retention protocol if ordered  Outcome: Progressing  Goal: Absence of urinary retention  Description: INTERVENTIONS:  - Assess patients ability to void and empty bladder  - Monitor I/O  - Bladder scan as needed  - Discuss with physician/AP medications to alleviate retention as needed  - Discuss catheterization for long term situations as appropriate  Outcome: Progressing     Problem: METABOLIC, FLUID AND ELECTROLYTES - ADULT  Goal: Electrolytes maintained within normal limits  Description: INTERVENTIONS:  - Monitor labs and assess patient for signs and symptoms of electrolyte imbalances  - Administer electrolyte replacement as ordered  - Monitor response to electrolyte replacements, including repeat lab results as appropriate  - Instruct patient on fluid and nutrition as appropriate  Outcome: Progressing  Goal: Fluid balance maintained  Description: INTERVENTIONS:  - Monitor labs   - Monitor I/O and WT  - Instruct patient on fluid and nutrition as appropriate  - Assess for signs & symptoms of volume excess or deficit  Outcome: Progressing     Problem: Nutrition/Hydration-ADULT  Goal: Nutrient/Hydration intake appropriate for improving, restoring or maintaining nutritional needs  Description: Monitor and assess patient's nutrition/hydration status for malnutrition  Collaborate with interdisciplinary team and initiate plan and interventions as ordered  Monitor patient's weight and dietary intake as ordered or per policy  Utilize nutrition screening tool and intervene as necessary   Determine patient's food preferences and provide high-protein, high-caloric foods as appropriate       INTERVENTIONS:  - Monitor oral intake, urinary output, labs, and treatment plans  - Assess nutrition and hydration status and recommend course of action  - Evaluate amount of meals eaten  - Assist patient with eating if necessary   - Allow adequate time for meals  - Recommend/ encourage appropriate diets, oral nutritional supplements, and vitamin/mineral supplements  - Order, calculate, and assess calorie counts as needed  - Recommend, monitor, and adjust tube feedings and TPN/PPN based on assessed needs  - Assess need for intravenous fluids  - Provide specific nutrition/hydration education as appropriate  - Include patient/family/caregiver in decisions related to nutrition  Outcome: Progressing

## 2022-09-12 LAB
BACTERIA TISS AEROBE CULT: NO GROWTH
GRAM STN SPEC: NORMAL

## 2022-09-12 NOTE — TELEPHONE ENCOUNTER
Post Op Note    Ron Jackosn is a 52 y o  female s/p CYSTO USCOPE W/ LASER, RETROGRADE AND STENT exchange, basket stone extraction (Left Bladder) performed 9-9-2022  Ron Jackson is a patient of Dr Robin Aguirre and is seen at the [facility] office  Call placed to patient  She did not answer  LMOM informing patient of follow up appointment on 9- at 10:30am at the [facility] office for stent removal with Dr Soraya Hager  Also, advised patient to contact the office to see how she is recovering and to confirm this appointment  Office number was provided for her to call back

## 2022-09-15 LAB
CALCIUM OXALATE DIHYDRATE MFR STONE IR: 30 %
COLOR STONE: NORMAL
COM MFR STONE: 70 %
COMMENT-STONE3: NORMAL
COMPOSITION: NORMAL
LABORATORY COMMENT REPORT: NORMAL
PHOTO: NORMAL
SIZE STONE: NORMAL MM
SPEC SOURCE SUBJ: NORMAL
STONE ANALYSIS-IMP: NORMAL
WT STONE: 49 MG

## 2022-09-16 ENCOUNTER — PROCEDURE VISIT (OUTPATIENT)
Dept: UROLOGY | Facility: HOSPITAL | Age: 47
End: 2022-09-16
Payer: COMMERCIAL

## 2022-09-16 VITALS
DIASTOLIC BLOOD PRESSURE: 88 MMHG | WEIGHT: 254 LBS | SYSTOLIC BLOOD PRESSURE: 130 MMHG | BODY MASS INDEX: 43.36 KG/M2 | HEART RATE: 70 BPM | HEIGHT: 64 IN

## 2022-09-16 DIAGNOSIS — N20.1 CALCULUS OF URETER: Primary | ICD-10-CM

## 2022-09-16 DIAGNOSIS — Z46.6 ENCOUNTER FOR REMOVAL OF URETERAL STENT: ICD-10-CM

## 2022-09-16 PROCEDURE — 99213 OFFICE O/P EST LOW 20 MIN: CPT | Performed by: UROLOGY

## 2022-09-16 PROCEDURE — 52310 CYSTOSCOPY AND TREATMENT: CPT | Performed by: UROLOGY

## 2022-09-16 NOTE — PROGRESS NOTES
HISTORY:    One month out from left ureteroscopy 7 mm upper ureteral stone  Calcium oxalate analysis  This was her first stone ever  Has done well with the stent  ASSESSMENT / PLAN:    Stent removed without difficulty  Twenty-four urine  Oxalate advice given, drink lots of fluids    If no further problems does not need specific follow-up  She knows to be on the alert for any recurrence of pain or stones  The following portions of the patient's history were reviewed and updated as appropriate: allergies, current medications, past family history, past medical history, past social history, past surgical history and problem list     Review of Systems   All other systems reviewed and are negative  Objective:     Physical Exam  Constitutional:       General: She is not in acute distress  Appearance: She is well-developed  She is not diaphoretic  HENT:      Head: Normocephalic and atraumatic  Eyes:      General: No scleral icterus  Pulmonary:      Effort: Pulmonary effort is normal    Skin:     Coloration: Skin is not pale  Neurological:      Mental Status: She is alert and oriented to person, place, and time  Psychiatric:         Behavior: Behavior normal          Thought Content: Thought content normal          Judgment: Judgment normal             Cystoscopy     Date/Time 9/16/2022 10:37 AM     Performed by  Jameel Callejas MD     Authorized by Jameel Callejas MD          Procedure Details:  Procedure type: simple removal of a foreign body, stone, or stent    Patient tolerance: Patient tolerated the procedure well with no immediate complications    Additional Procedure Details:     Patient presents for cystoscopy  I described the procedure, answered any questions, and we discussed potential risks and complications  Patient expressed understanding, and signed an informed consent document  The patient was carefully placed in lithotomy position on the examining table    The urethra was prepped with sterile disinfectant  The 15 French flexible cystoscope was passed in the urethra with the following findings:    Urethra:  No stenosis     Bladder:  Normal   Stent removed without difficulty    Residual urine estimated to be minimal     The patient tolerated the procedure well and was escorted from the examining table  No results found for: PSA]  BUN   Date Value Ref Range Status   08/09/2022 13 5 - 25 mg/dL Final     Creatinine   Date Value Ref Range Status   08/09/2022 1 32 (H) 0 60 - 1 30 mg/dL Final     Comment:     Standardized to IDMS reference method     No components found for: CBC      Patient Active Problem List   Diagnosis    Acute pancreatitis    Cholelithiasis    Ventral hernia without obstruction or gangrene    [facility]bid [facility] [address] [facility]    Alcohol abuse    Complicated UTI (urinary tract infection)    Hypokalemia    Alcohol withdrawal delirium, acute, hyperactive ([facility] [address] )    Diarrhea due to malabsorption    Colon cancer screening    Fatty liver    Left ureteral stone    CHARLIE (acute kidney injury) ([facility] [address] [facility]    Hydronephrosis with urinary obstruction due to ureteral calculus    Asthma    Bariatric surgery status        Diagnoses and all orders for this visit:    Calculus of ureter  -     Stone risk profile           Patient ID: Alba Israel is a 52 y o  female        Current Outpatient Medications:     Addyi 100 MG TABS, , Disp: , Rfl:     ALPRAZolam (XANAX) 0 25 mg tablet, Take 0 25 mg by mouth 3 (three) times a day as needed, Disp: , Rfl:     Ascorbic Acid (VITAMIN C PO), Take by mouth, Disp: , Rfl:     BIOTIN PO, Take by mouth, Disp: , Rfl:     FLUoxetine (PROzac) 10 mg capsule, Take 20 mg by mouth daily, Disp: , Rfl:     FLUoxetine (PROzac) 20 mg capsule, Take 20 mg by mouth every evening, Disp: , Rfl:     HYDROcodone-acetaminophen (NORCO) 5-325 mg per tablet, Take 1 tablet by mouth every 4 (four) hours as needed, Disp: , Rfl:     Ibuprofen 200 MG CAPS, Take by mouth, Disp: , Rfl:     MINOXIDIL FOR WOMEN EX, Weekly on sunday, Disp: , Rfl:     Multiple Vitamin (multivitamin) tablet, Take 1 tablet by mouth daily, Disp: , Rfl:     naproxen (NAPROSYN) 375 mg tablet, Take 375 mg by mouth if needed, Disp: , Rfl:     oxybutynin (DITROPAN) 5 mg tablet, Take 1 tablet (5 mg total) by mouth 3 (three) times a day as needed (bladder spasms, stent irritation), Disp: 90 tablet, Rfl: 0    phenazopyridine (PYRIDIUM) 100 mg tablet, Take 1 tablet (100 mg total) by mouth 3 (three) times a day as needed for bladder spasms (burnin with urination), Disp: 10 tablet, Rfl: 0    tamsulosin (FLOMAX) 0 4 mg, Take 1 capsule (0 4 mg total) by mouth daily as needed (flank pain with indwelling stent) (Patient taking differently: Take 0 4 mg by mouth daily at bedtime), Disp: 30 capsule, Rfl: 0    Past Medical History:   Diagnosis Date    Alcohol abuse 01/30/2019    Sober per pt x4 years    Anemia     Anesthesia     per pt "always wakes up ornery-- tries to pull tube out--sit up right away"    Anxiety     Asthma     sports induced has not used inhaler in 10 years or so    Bariatric surgery status     roun y gastric bypass--- in 2002 or so    Blood in urine     BP (high blood pressure)     not diagnosed but recently with kidney stone    Burning with urination     Depression     Diarrhea due to malabsorption 02/21/2019    Fatty liver 03/21/2019    Hiatal hernia     Kidney stone     Left ACL tear     MCL and PCL-has had for 15 yrs or so    Left flank pain     Morbid obesity ([address] ) 09/27/2018    Pancreatitis     per pt had it in the past    Personal history of COVID-19     approx 2 months ago-- recovered at home    Spinal headache     Tooth disorder     "one tooth hole in it"    Wears glasses        Past Surgical History:   Procedure Laterality Date    CHOLECYSTECTOMY LAPAROSCOPIC N/A 02/06/2019    Procedure: CHOLECYSTECTOMY LAPAROSCOPIC with ioc;  Surgeon: Gardiner Sicard, MD; Location: [facility];  Service: General    DILATION AND CURETTAGE OF UTERUS      FL RETROGRADE PYELOGRAM  08/09/2022    FL RETROGRADE PYELOGRAM  9/9/2022    GASTRIC BYPASS      HI CYSTO/URETERO W/LITHOTRIPSY &INDWELL STENT INSRT Left 9/9/2022    Procedure: CYSTO USCOPE W/ LASER, RETROGRADE AND STENT exchange, basket stone extraction;  Surgeon: Crystal Mendez MD;  [facility];  Service: Urology    HI CYSTOURETHROSCOPY,URETER CATHETER Left 08/09/2022    Procedure: CYSTOSCOPY RETROGRADE PYELOGRAM WITH INSERTION STENT URETERAL;  Surgeon: Crystal Mendez MD;  [facility];  Service: Urology    [address] EXTRACTION         Social History

## 2022-09-16 NOTE — PATIENT INSTRUCTIONS
DRINK 3 QUARTS (96 Oz ) LIQUIDS EACH DAY - ALL LIQUIDS COUNT       ** THESE FOODS ARE HIGH IN [place] - TRY TO LIMIT HOW MUCH OF THESE YOU EAT:  Coke and Pepsi  Nuts  Dark Leafy Greens:     Spinach and Kale, Rhubarb, Chard  Asparagus  Beets  Sweet potatoes   Blueberries, Strawberries   Dark tea (Green tea is okay)  Tofu    ADD LEMON JUICE 3 OZ  DAILY - Fresh squeezed or lemon juice concentrate - Not MinuteMaid, Stateless  [place] ([facility]) Hill, Crystal Lite, etc         ** Recipe for SAM'S OLDE TYME LEMONADE:         One ounce of lemon juice, glass of water, sweetener of your choice    Coffee is okay! Cranberry juice is good to prevent infections, but does not help for stones

## 2022-10-11 PROBLEM — N39.0 COMPLICATED UTI (URINARY TRACT INFECTION): Status: RESOLVED | Noted: 2019-01-30 | Resolved: 2022-10-11

## 2022-10-30 ENCOUNTER — HOSPITAL ENCOUNTER (OUTPATIENT)
Facility: HOSPITAL | Age: 47
Setting detail: OBSERVATION
Discharge: HOME | End: 2022-10-31
Attending: EMERGENCY MEDICINE | Admitting: SURGERY

## 2022-10-30 ENCOUNTER — APPOINTMENT (EMERGENCY)
Dept: CT IMAGING | Facility: HOSPITAL | Age: 47
End: 2022-10-30

## 2022-10-30 DIAGNOSIS — R10.9 ABDOMINAL PAIN: Primary | ICD-10-CM

## 2022-10-30 DIAGNOSIS — K59.00 CONSTIPATION: ICD-10-CM

## 2022-10-30 DIAGNOSIS — K43.6: ICD-10-CM

## 2022-10-30 LAB
ALBUMIN SERPL BCP-MCNC: 3.5 G/DL (ref 3.5–5)
ALP SERPL-CCNC: 72 U/L (ref 46–116)
ALT SERPL W P-5'-P-CCNC: 14 U/L (ref 12–78)
ANION GAP SERPL CALCULATED.3IONS-SCNC: 6 MMOL/L (ref 4–13)
AST SERPL W P-5'-P-CCNC: 10 U/L (ref 5–45)
BASOPHILS # BLD AUTO: 0.03 THOUSANDS/ÂΜL (ref 0–0.1)
BASOPHILS NFR BLD AUTO: 0 % (ref 0–1)
BILIRUB SERPL-MCNC: 0.6 MG/DL (ref 0.2–1)
BILIRUB UR QL STRIP: NEGATIVE
BUN SERPL-MCNC: 12 MG/DL (ref 5–25)
CALCIUM SERPL-MCNC: 8.4 MG/DL (ref 8.3–10.1)
CHLORIDE SERPL-SCNC: 104 MMOL/L (ref 96–108)
CLARITY UR: CLEAR
CO2 SERPL-SCNC: 30 MMOL/L (ref 21–32)
COLOR UR: YELLOW
CREAT SERPL-MCNC: 0.91 MG/DL (ref 0.6–1.3)
EOSINOPHIL # BLD AUTO: 0.1 THOUSAND/ÂΜL (ref 0–0.61)
EOSINOPHIL NFR BLD AUTO: 1 % (ref 0–6)
ERYTHROCYTE [DISTWIDTH] IN BLOOD BY AUTOMATED COUNT: 12.5 % (ref 11.6–15.1)
GFR SERPL CREATININE-BSD FRML MDRD: 75 ML/MIN/1.73SQ M
GLUCOSE SERPL-MCNC: 81 MG/DL (ref 65–140)
GLUCOSE UR STRIP-MCNC: NEGATIVE MG/DL
HCT VFR BLD AUTO: 43.3 % (ref 34.8–46.1)
HGB BLD-MCNC: 14 G/DL (ref 11.5–15.4)
HGB UR QL STRIP.AUTO: NEGATIVE
IMM GRANULOCYTES # BLD AUTO: 0.02 THOUSAND/UL (ref 0–0.2)
IMM GRANULOCYTES NFR BLD AUTO: 0 % (ref 0–2)
KETONES UR STRIP-MCNC: NEGATIVE MG/DL
LACTATE SERPL-SCNC: 0.7 MMOL/L (ref 0.5–2)
LEUKOCYTE ESTERASE UR QL STRIP: NEGATIVE
LIPASE SERPL-CCNC: 138 U/L (ref 73–393)
LYMPHOCYTES # BLD AUTO: 1.99 THOUSANDS/ÂΜL (ref 0.6–4.47)
LYMPHOCYTES NFR BLD AUTO: 24 % (ref 14–44)
MCH RBC QN AUTO: 31.4 PG (ref 26.8–34.3)
MCHC RBC AUTO-ENTMCNC: 32.3 G/DL (ref 31.4–37.4)
MCV RBC AUTO: 97 FL (ref 82–98)
MONOCYTES # BLD AUTO: 0.51 THOUSAND/ÂΜL (ref 0.17–1.22)
MONOCYTES NFR BLD AUTO: 6 % (ref 4–12)
NEUTROPHILS # BLD AUTO: 5.52 THOUSANDS/ÂΜL (ref 1.85–7.62)
NEUTS SEG NFR BLD AUTO: 69 % (ref 43–75)
NITRITE UR QL STRIP: NEGATIVE
NRBC BLD AUTO-RTO: 0 /100 WBCS
PH UR STRIP.AUTO: 7 [PH]
PLATELET # BLD AUTO: 176 THOUSANDS/UL (ref 149–390)
PLATELET # BLD AUTO: 183 THOUSANDS/UL (ref 149–390)
PMV BLD AUTO: 11.5 FL (ref 8.9–12.7)
PMV BLD AUTO: 11.5 FL (ref 8.9–12.7)
POTASSIUM SERPL-SCNC: 3.7 MMOL/L (ref 3.5–5.3)
PROT SERPL-MCNC: 7.2 G/DL (ref 6.4–8.4)
PROT UR STRIP-MCNC: NEGATIVE MG/DL
RBC # BLD AUTO: 4.46 MILLION/UL (ref 3.81–5.12)
SODIUM SERPL-SCNC: 140 MMOL/L (ref 135–147)
SP GR UR STRIP.AUTO: 1.01 (ref 1–1.03)
UROBILINOGEN UR STRIP-ACNC: <2 MG/DL
WBC # BLD AUTO: 8.17 THOUSAND/UL (ref 4.31–10.16)

## 2022-10-30 RX ORDER — ONDANSETRON 2 MG/ML
4 INJECTION INTRAMUSCULAR; INTRAVENOUS EVERY 6 HOURS PRN
Status: DISCONTINUED | OUTPATIENT
Start: 2022-10-30 | End: 2022-10-31 | Stop reason: HOSPADM

## 2022-10-30 RX ORDER — OXYBUTYNIN CHLORIDE 5 MG/1
5 TABLET ORAL 3 TIMES DAILY PRN
Status: DISCONTINUED | OUTPATIENT
Start: 2022-10-30 | End: 2022-10-31 | Stop reason: HOSPADM

## 2022-10-30 RX ORDER — HYDROMORPHONE HCL IN WATER/PF 6 MG/30 ML
0.2 PATIENT CONTROLLED ANALGESIA SYRINGE INTRAVENOUS
Status: DISCONTINUED | OUTPATIENT
Start: 2022-10-30 | End: 2022-10-30

## 2022-10-30 RX ORDER — TAMSULOSIN HYDROCHLORIDE 0.4 MG/1
0.4 CAPSULE ORAL
Status: DISCONTINUED | OUTPATIENT
Start: 2022-10-30 | End: 2022-10-30

## 2022-10-30 RX ORDER — OXYCODONE HYDROCHLORIDE 5 MG/1
5 TABLET ORAL EVERY 4 HOURS PRN
Status: DISCONTINUED | OUTPATIENT
Start: 2022-10-30 | End: 2022-10-31 | Stop reason: HOSPADM

## 2022-10-30 RX ORDER — KETOROLAC TROMETHAMINE 30 MG/ML
15 INJECTION, SOLUTION INTRAMUSCULAR; INTRAVENOUS EVERY 6 HOURS PRN
Status: DISCONTINUED | OUTPATIENT
Start: 2022-10-30 | End: 2022-10-30

## 2022-10-30 RX ORDER — ALBUTEROL SULFATE 90 UG/1
2 AEROSOL, METERED RESPIRATORY (INHALATION) EVERY 4 HOURS PRN
Status: DISCONTINUED | OUTPATIENT
Start: 2022-10-30 | End: 2022-10-31 | Stop reason: HOSPADM

## 2022-10-30 RX ORDER — DEXTROSE AND SODIUM CHLORIDE 5; .45 G/100ML; G/100ML
75 INJECTION, SOLUTION INTRAVENOUS CONTINUOUS
Status: DISCONTINUED | OUTPATIENT
Start: 2022-10-30 | End: 2022-10-31

## 2022-10-30 RX ORDER — FLUOXETINE HYDROCHLORIDE 20 MG/1
20 CAPSULE ORAL EVERY EVENING
Status: DISCONTINUED | OUTPATIENT
Start: 2022-10-30 | End: 2022-10-30

## 2022-10-30 RX ORDER — OXYCODONE HYDROCHLORIDE 5 MG/1
5 TABLET ORAL EVERY 4 HOURS PRN
Status: DISCONTINUED | OUTPATIENT
Start: 2022-10-30 | End: 2022-10-30

## 2022-10-30 RX ORDER — OXYCODONE HYDROCHLORIDE 5 MG/1
10 TABLET ORAL EVERY 4 HOURS PRN
Status: DISCONTINUED | OUTPATIENT
Start: 2022-10-30 | End: 2022-10-31 | Stop reason: HOSPADM

## 2022-10-30 RX ORDER — ONDANSETRON 2 MG/ML
4 INJECTION INTRAMUSCULAR; INTRAVENOUS ONCE
Status: COMPLETED | OUTPATIENT
Start: 2022-10-30 | End: 2022-10-30

## 2022-10-30 RX ORDER — HEPARIN SODIUM 5000 [USP'U]/ML
5000 INJECTION, SOLUTION INTRAVENOUS; SUBCUTANEOUS EVERY 8 HOURS SCHEDULED
Status: DISCONTINUED | OUTPATIENT
Start: 2022-10-30 | End: 2022-10-31 | Stop reason: HOSPADM

## 2022-10-30 RX ORDER — HYDROMORPHONE HCL/PF 1 MG/ML
0.5 SYRINGE (ML) INJECTION
Status: DISCONTINUED | OUTPATIENT
Start: 2022-10-30 | End: 2022-10-31 | Stop reason: HOSPADM

## 2022-10-30 RX ORDER — PHENAZOPYRIDINE HYDROCHLORIDE 100 MG/1
100 TABLET, FILM COATED ORAL 3 TIMES DAILY PRN
Status: DISCONTINUED | OUTPATIENT
Start: 2022-10-30 | End: 2022-10-31 | Stop reason: HOSPADM

## 2022-10-30 RX ORDER — FENTANYL CITRATE 50 UG/ML
50 INJECTION, SOLUTION INTRAMUSCULAR; INTRAVENOUS ONCE
Status: COMPLETED | OUTPATIENT
Start: 2022-10-30 | End: 2022-10-30

## 2022-10-30 RX ORDER — IBUPROFEN 400 MG/1
400 TABLET ORAL EVERY 6 HOURS PRN
Status: DISCONTINUED | OUTPATIENT
Start: 2022-10-30 | End: 2022-10-31 | Stop reason: HOSPADM

## 2022-10-30 RX ADMIN — INFLUENZA VIRUS VACCINE 0.5 ML: 15; 15; 15; 15 SUSPENSION INTRAMUSCULAR at 20:34

## 2022-10-30 RX ADMIN — OXYCODONE HYDROCHLORIDE 5 MG: 5 TABLET ORAL at 19:12

## 2022-10-30 RX ADMIN — HEPARIN SODIUM 5000 UNITS: 5000 INJECTION INTRAVENOUS; SUBCUTANEOUS at 17:27

## 2022-10-30 RX ADMIN — DEXTROSE AND SODIUM CHLORIDE 75 ML/HR: 5; .45 INJECTION, SOLUTION INTRAVENOUS at 17:11

## 2022-10-30 RX ADMIN — FENTANYL CITRATE 50 MCG: 50 INJECTION, SOLUTION INTRAMUSCULAR; INTRAVENOUS at 16:19

## 2022-10-30 RX ADMIN — IOHEXOL 100 ML: 350 INJECTION, SOLUTION INTRAVENOUS at 14:39

## 2022-10-30 RX ADMIN — ONDANSETRON 4 MG: 2 INJECTION INTRAMUSCULAR; INTRAVENOUS at 13:48

## 2022-10-30 RX ADMIN — HEPARIN SODIUM 5000 UNITS: 5000 INJECTION INTRAVENOUS; SUBCUTANEOUS at 20:34

## 2022-10-30 RX ADMIN — HYDROMORPHONE HYDROCHLORIDE 0.5 MG: 1 INJECTION, SOLUTION INTRAMUSCULAR; INTRAVENOUS; SUBCUTANEOUS at 20:38

## 2022-10-30 RX ADMIN — FENTANYL CITRATE 50 MCG: 50 INJECTION, SOLUTION INTRAMUSCULAR; INTRAVENOUS at 13:47

## 2022-10-30 NOTE — H&P
H&P - General Surgery   Donald Ortiz 52 y o  female MRN: [id number]  Unit/Bed#: ED 07 Encounter: [id number]  Assessment and Plan  51-year-old female with supraumbilical ventral incisional hernia  - patient with history of Adilson-en-Y gastric bypass in 2001 at [facility]  - patient with known long-standing incisional hernia  - afebrile, vital signs stable  - no leukocytosis  - lactic acid, 0 7  - CT abdomen pelvis impression  1  Supraumbilical ventral hernia containing a segment of transverse colon which demonstrates wall thickening  There is trace fluid within the hernia sac as well as haziness of the contained fat  Findings overall are concerning for strangulation of the   involved portion of transverse colon  Surgical consultation is recommended  2   Moderate colonic stool burden within the ascending colon with fecalization of several loops of distal ileum  Findings are favored represent delayed transit/partial obstruction secondary to findings described in impression #1   3   Status post Adilson-en-Y gastric bypass    - on initial abdominal exam, visualized bold of hernia in supraumbilical region, soft overlying tissue with hardened area in the supraumbilical region, TTP over hernia and mild TTP over RLQ and LLQ  - patient received dose to pain medication and was placed in Trendelenburg position, hernia was reduced with patient expressing immediate improvement in pain; upon sitting upright, there was no supraumbilical bulge visualized; area of hernia soft and reduced  - admit to observation  - monitor for bowel function, abdominal exams, vitals, labs  - start clear liquid diet  - IV fluids until patient has adequate oral intake  - analgesia p r n    -DVT prophylaxis  - at discharge, will provide general surgery contact information to discuss hernia repair on an elective basis  - all questions answered  - d/w Dr Khadijah Wilson  - reports her asthma sports induced  - no recent inhaler use  - albuterol inhaler prn wheezing    Anxitey   - stopped taking Prozac several months ago, currently off meds    History of alcohol abuse  - currently 5 years sober    History of Present Illness   Chief Complaint:  Hernia that is painful and hard     HPI:  Oscar Argueta is a 52 y o  female with past medical history significant for Adilson-en-Y gastric bypass surgery in 2001, laparoscopic cholecystectomy in 2019, asthma, and anxiety who initially presented to ED with complaints of abdominal pain worsening over the course last 3 days  Patient states that she has had a longstanding known ventral hernia at her incision site from her Adilson-en-Y gastric bypass surgery  She states that at times the area of the hernia becomes hard and she seems to get distended at that area which typically self-resolve  Starting 3 days ago she began to have similar symptoms but the pain has continued to worsen and hernia site remains hard  She states that it began feeling like gas pain and now the gas seems to accumulate at the area of hernia  She reports feelings of nausea before presenting to the ED today  Last ate around 11:00 a m  Isabel Frost She states that she has had decreased flatus and feels somewhat bloated  Over last week was having frequent stools but within the last day she has not been having bowel movements  She states that she has never been able to reduce her hernia in the past   Reports episodes of diaphoresis last night  Abdominal surgeries significant for Adilson-en-Y gastric bypass (2001) and laparoscopic cholecystectomy (2019)  Patient has never had a small bowel obstruction past     The only time patient has seen a general surgeon in the past was to have her cholecystectomy with Dr Ray Mcfarlane in 2019  Patient discussed with Dr Ray Mcfarlane the possibility of having the ventral hernia repaired and weight loss was also part of their discussion  Patient had recently considered making an appointment to discuss hernia repair        Review of Systems   Constitutional: Positive for diaphoresis (Last night)  Negative for chills and fever  HENT: Negative for sore throat  Respiratory: Negative for shortness of breath  Cardiovascular: Negative for chest pain  Gastrointestinal: Positive for abdominal pain and nausea  Endocrine: Negative for polyuria  Genitourinary: Negative for dysuria and frequency  Musculoskeletal: Negative for arthralgias  Skin: Negative for color change  Psychiatric/Behavioral: Negative for confusion         Historical Information   Past Medical History:   Diagnosis Date   • Alcohol abuse 01/30/2019    Sober per pt x4 years   • Anemia    • Anesthesia     per pt "always wakes up ornery-- tries to pull tube out--sit up right away"   • Anxiety    • Asthma     sports induced has not used inhaler in 10 years or so   • Bariatric surgery status     roun y gastric bypass--- in 2002 or so   • Blood in urine    • BP (high blood pressure)     not diagnosed but recently with kidney stone   • Burning with urination    • Depression    • Diarrhea due to malabsorption 02/21/2019   • Fatty liver 03/21/2019   • Hiatal hernia    • Kidney stone    • Left ACL tear     MCL and PCL-has had for 15 yrs or so   • Left flank pain    • Morbid obesity ([facility] [address] ) 09/27/2018   • Pancreatitis     per pt had it in the past   • Personal history of COVID-19     approx 2 months ago-- recovered at home   • Spinal headache    • Tooth disorder     "one tooth hole in it"   • Wears glasses      Past Surgical History:   Procedure Laterality Date   • CHOLECYSTECTOMY LAPAROSCOPIC N/A 02/06/2019    Procedure: CHOLECYSTECTOMY LAPAROSCOPIC with ioc;  Surgeon: Emilie Altman MD;  Location: [facility] OR;  Service: General   • DILATION AND CURETTAGE OF UTERUS     • FL RETROGRADE PYELOGRAM  08/09/2022   • FL RETROGRADE PYELOGRAM  9/9/2022   • GASTRIC BYPASS     • GA CYSTO/URETERO W/LITHOTRIPSY &INDWELL STENT INSRT Left 9/9/2022    Procedure: CYSTO USCOPE W/ LASER, RETROGRADE AND STENT exchange, basket stone extraction;  Surgeon: Tammy Machado MD;  [facility];  Service: Urology   • VA CYSTOURETHROSCOPY,URETER CATHETER Left 08/09/2022    Procedure: CYSTOSCOPY RETROGRADE PYELOGRAM WITH INSERTION STENT URETERAL;  Surgeon: Tammy Machado MD;  [facility];  Service: Urology   • WISDOM TOOTH EXTRACTION       Social History   Social History     Substance and Sexual Activity   Alcohol Use Not Currently   • Alcohol/week: 5 0 standard drinks   • Types: 5 Shots of liquor per week    Comment: past hx of alcohol abuse  "sober for 4 years"     Social History     Substance and Sexual Activity   Drug Use Yes   • Types: Marijuana     Social History     Tobacco Use   Smoking Status Never Smoker   Smokeless Tobacco Never Used     E-Cigarette/Vaping   • E-Cigarette Use Never User      E-Cigarette/Vaping Substances     Family History: non-contributory    Meds/Allergies     Allergies   Allergen Reactions   • Augmentin [Amoxicillin-Pot Clavulanate] Rash   • Ciprofloxacin Rash   • Doxycycline Rash   • Morphine Hallucinations     "felt like ants crawling all over"   • Penicillins Rash     "All Cillins"   • Tylenol [Acetaminophen] Other (See Comments)     nosebleeds   • Latex Rash   • Tomato - Food Allergy Other (See Comments)     Tongue irritation       Objective   First Vitals:   Blood Pressure: 140/65 (10/30/22 1225)  Pulse: 63 (10/30/22 1225)  Temperature: 98 4 °F (36 9 °C) (10/30/22 1224)  Temp Source: Temporal (10/30/22 1224)  Respirations: 16 (10/30/22 1225)  Weight - Scale: 115 kg (254 lb) (10/30/22 1225)  SpO2: 96 % (10/30/22 1225)    Current Vitals:   Blood Pressure: 148/80 (10/30/22 1624)  Pulse: 61 (10/30/22 1624)  Temperature: 98 4 °F (36 9 °C) (10/30/22 1224)  Temp Source: Temporal (10/30/22 1224)  Respirations: 16 (10/30/22 1624)  Weight - Scale: 115 kg (254 lb) (10/30/22 1225)  SpO2: 96 % (10/30/22 1624)    No intake or output data in the 24 hours ending 10/30/22 1701    Invasive Devices  Report    Peripheral Intravenous Line  Duration           Peripheral IV 10/30/22 Left Antecubital <1 day          Drain  Duration           Ureteral Internal Stent Left ureter 6 Fr  51 days                Physical Exam  Constitutional:       General: She is not in acute distress  Appearance: She is obese  She is not diaphoretic  HENT:      Head: Normocephalic and atraumatic  Right Ear: External ear normal       Left Ear: External ear normal       Nose: Nose normal    Eyes:      Extraocular Movements: Extraocular movements intact  Cardiovascular:      Rate and Rhythm: Normal rate and regular rhythm  Pulmonary:      Effort: Pulmonary effort is normal       Breath sounds: Normal breath sounds  Abdominal:      Tenderness: There is no right CVA tenderness, left CVA tenderness, guarding or rebound  Comments: Initial abdominal exam:  Midline incisional scar extending superior fully from umbilicus, supraumbilical incisional hernia bulge visualized  Obese abdomen  Bowel sounds present, somewhat hyperactive over incisional hernia  TTP over soft overlying tissue with hardened area in the supraumbilical region, TTP over hernia and mild TTP over RLQ and LLQ  Hernia reduced  Serial abdominal exam:  Bowel sounds present  Obese abdomen  No bulge noted  Soft, hernia reduced  Mild tenderness to palpation over supraumbilical hernia, RLQ, LLQ     Musculoskeletal:      Right lower leg: No edema  Left lower leg: No edema  Skin:     General: Skin is warm and dry  Neurological:      General: No focal deficit present  Lab Results: I have personally reviewed pertinent lab results  Imaging: I have personally reviewed pertinent reports  Code Status: Prior  Advance Directive and Living Will:      Power of :    POLST:      Counseling / Coordination of Care  Total floor / unit time spent today 30 minutes    Greater than 50% of total time was spent with the patient and / or family counseling and / or coordination of care  A description of the counseling / coordination of care:  Chart review, physical examination, history taking, discussion with attending physician      Kelly Mendes  10/30/2022

## 2022-10-30 NOTE — PLAN OF CARE
Problem: PAIN - ADULT  Goal: Verbalizes/displays adequate comfort level or baseline comfort level  Description: Interventions:  - Encourage patient to monitor pain and request assistance  - Assess pain using appropriate pain scale  - Administer analgesics based on type and severity of pain and evaluate response  - Implement non-pharmacological measures as appropriate and evaluate response  - Consider cultural and social influences on pain and pain management  - Notify physician/advanced practitioner if interventions unsuccessful or patient reports new pain  Outcome: Progressing     Problem: INFECTION - ADULT  Goal: Absence or prevention of progression during hospitalization  Description: INTERVENTIONS:  - Assess and monitor for signs and symptoms of infection  - Monitor lab/diagnostic results  - Monitor all insertion sites, i e  indwelling lines, tubes, and drains  - Monitor endotracheal if appropriate and nasal secretions for changes in amount and color  - [place] appropriate cooling/warming therapies per order  - Administer medications as ordered  - Instruct and encourage patient and family to use good hand hygiene technique  - Identify and instruct in appropriate isolation precautions for identified infection/condition  Outcome: Progressing  Goal: Absence of fever/infection during neutropenic period  Description: INTERVENTIONS:  - Monitor WBC    Outcome: Progressing     Problem: SAFETY ADULT  Goal: Patient will remain free of falls  Description: INTERVENTIONS:  - Educate patient/family on patient safety including physical limitations  - Instruct patient to call for assistance with activity   - Consult OT/PT to assist with strengthening/mobility   - Keep Call bell within reach  - Keep bed low and locked with side rails adjusted as appropriate  - Keep care items and personal belongings within reach  - Initiate and maintain comfort rounds  - Make Fall Risk Sign visible to staff  - Offer Toileting every 2 Hours, in advance of need  - Initiate/Maintain alarm  - Obtain necessary fall risk management equipment:   - Apply yellow socks and bracelet for high fall risk patients  - Consider moving patient to room near nurses station  Outcome: Progressing  Goal: Maintain or return to baseline ADL function  Description: INTERVENTIONS:  -  Assess patient's ability to carry out ADLs; assess patient's baseline for ADL function and identify physical deficits which impact ability to perform ADLs (bathing, care of mouth/teeth, toileting, grooming, dressing, etc )  - Assess/evaluate cause of self-care deficits   - Assess range of motion  - Assess patient's mobility; develop plan if impaired  - Assess patient's need for assistive devices and provide as appropriate  - Encourage maximum independence but intervene and supervise when necessary  - Involve family in performance of ADLs  - Assess for home care needs following discharge   - Consider OT consult to assist with ADL evaluation and planning for discharge  - Provide patient education as appropriate  Outcome: Progressing  Goal: Maintains/Returns to pre admission functional level  Description: INTERVENTIONS:  - Perform BMAT or MOVE assessment daily    - Set and communicate daily mobility goal to care team and patient/family/caregiver     - Collaborate with rehabilitation services on mobility goals if consulted  - Out of bed for toileting  - Record patient progress and toleration of activity level   Outcome: Progressing     Problem: DISCHARGE PLANNING  Goal: Discharge to home or other facility with appropriate resources  Description: INTERVENTIONS:  - Identify barriers to discharge w/patient and caregiver  - Arrange for needed discharge resources and transportation as appropriate  - Identify discharge learning needs (meds, wound care, etc )  - Arrange for interpretive services to assist at discharge as needed  - Refer to Case Management Department for coordinating discharge planning if the patient needs post-hospital services based on physician/advanced practitioner order or complex needs related to functional status, cognitive ability, or social support system  Outcome: Progressing     Problem: Knowledge Deficit  Goal: Patient/family/caregiver demonstrates understanding of disease process, treatment plan, medications, and discharge instructions  Description: Complete learning assessment and assess knowledge base    Interventions:  - Provide teaching at level of understanding  - Provide teaching via preferred learning methods  Outcome: Progressing

## 2022-10-30 NOTE — ED PROVIDER NOTES
History  Chief Complaint   Patient presents with   • Abdominal Pain     Mid upper abdominal pain x3 days  Pt has a known hernia to this area  +N,-V/D     Patient is a 51 y/o with h/o gastric bypass, anxiety, asthma that presents to the ED with epigastric pain that started 3 days ago  She states she has a hernia in that area that becomes hard at times  She has nausea, no vomiting  No diarrhea or constipation  No blood in stool  No fevers, chills  History provided by:  Patient  Abdominal Pain  Pain location:  Epigastric  Pain quality: aching    Pain radiates to:  Does not radiate  Pain severity:  Moderate  Onset quality:  Gradual  Duration:  3 days  Timing:  Constant  Progression:  Unchanged  Chronicity:  New  Context: not sick contacts, not suspicious food intake and not trauma    Relieved by:  Nothing  Worsened by:  Nothing  Ineffective treatments:  None tried  Associated symptoms: nausea    Associated symptoms: no chest pain, no chills, no constipation, no cough, no diarrhea, no dysuria, no fever, no hematuria, no shortness of breath, no sore throat, no vaginal bleeding, no vaginal discharge and no vomiting        Prior to Admission Medications   Prescriptions Last Dose Informant Patient Reported? Taking?    ALPRAZolam (XANAX) 0 25 mg tablet   Yes No   Sig: Take 0 25 mg by mouth 3 (three) times a day as needed   Addyi 100 MG TABS   Yes No   Ascorbic Acid (VITAMIN C PO)   Yes No   Sig: Take by mouth   BIOTIN PO   Yes No   Sig: Take by mouth   FLUoxetine (PROzac) 10 mg capsule   Yes No   Sig: Take 20 mg by mouth daily   FLUoxetine (PROzac) 20 mg capsule   Yes No   Sig: Take 20 mg by mouth every evening   HYDROcodone-acetaminophen (NORCO) 5-325 mg per tablet   Yes No   Sig: Take 1 tablet by mouth every 4 (four) hours as needed   Ibuprofen 200 MG CAPS   Yes No   Sig: Take by mouth   MINOXIDIL FOR WOMEN EX   Yes No   Sig: Weekly on sunday   Multiple Vitamin (multivitamin) tablet   Yes No   Sig: Take 1 tablet by mouth daily   naproxen (NAPROSYN) 375 mg tablet   Yes No   Sig: Take 375 mg by mouth if needed   oxybutynin (DITROPAN) 5 mg tablet   No No   Sig: Take 1 tablet (5 mg total) by mouth 3 (three) times a day as needed (bladder spasms, stent irritation)   phenazopyridine (PYRIDIUM) 100 mg tablet   No No   Sig: Take 1 tablet (100 mg total) by mouth 3 (three) times a day as needed for bladder spasms (burnin with urination)   tamsulosin (FLOMAX) 0 4 mg   No No   Sig: Take 1 capsule (0 4 mg total) by mouth daily as needed (flank pain with indwelling stent)   Patient taking differently: Take 0 4 mg by mouth daily at bedtime      Facility-Administered Medications: None       Past Medical History:   Diagnosis Date   • Alcohol abuse 01/30/2019    Sober per pt x4 years   • Anemia    • Anesthesia     per pt "always wakes up ornery-- tries to pull tube out--sit up right away"   • Anxiety    • Asthma     sports induced has not used inhaler in 10 years or so   • Bariatric surgery status     roun y gastric bypass--- in 2002 or so   • Blood in urine    • BP (high blood pressure)     not diagnosed but recently with kidney stone   • Burning with urination    • Depression    • Diarrhea due to malabsorption 02/21/2019   • Fatty liver 03/21/2019   • Hiatal hernia    • Kidney stone    • Left ACL tear     MCL and PCL-has had for 15 yrs or so   • Left flank pain    • Morbid obesity ([address] ) 09/27/2018   • Pancreatitis     per pt had it in the past   • Personal history of COVID-19     approx 2 months ago-- recovered at home   • Spinal headache    • Tooth disorder     "one tooth hole in it"   • Wears glasses        Past Surgical History:   Procedure Laterality Date   • CHOLECYSTECTOMY LAPAROSCOPIC N/A 02/06/2019    Procedure: CHOLECYSTECTOMY LAPAROSCOPIC with ioc;  Surgeon: Cristal Ramos MD;  Location: [facility];  Service: General   • DILATION AND CURETTAGE OF UTERUS     • FL RETROGRADE PYELOGRAM  08/09/2022   • FL RETROGRADE PYELOGRAM 9/9/2022   • GASTRIC BYPASS     • MT CYSTO/URETERO W/LITHOTRIPSY &INDWELL STENT INSRT Left 9/9/2022    Procedure: CYSTO USCOPE W/ LASER, RETROGRADE AND STENT exchange, basket stone extraction;  Surgeon: Sabrina Hernandez MD;  Location: [facility];  Service: Urology   • MT CYSTOURETHROSCOPY,URETER CATHETER Left 08/09/2022    Procedure: CYSTOSCOPY RETROGRADE PYELOGRAM WITH INSERTION STENT URETERAL;  Surgeon: Sabrina Hernandez MD;  Location: [facility];  Service: Urology   • WISDOM TOOTH EXTRACTION         Family History   Problem Relation Age of Onset   • Alcohol abuse Maternal Grandfather    • Colon polyps Neg Hx    • Colon cancer Neg Hx      I have reviewed and agree with the history as documented  E-Cigarette/Vaping   • E-Cigarette Use Never User      E-Cigarette/Vaping Substances     Social History     Tobacco Use   • Smoking status: Never Smoker   • Smokeless tobacco: Never Used   Vaping Use   • Vaping Use: Never used   Substance Use Topics   • Alcohol use: Not Currently     Alcohol/week: 5 0 standard drinks     Types: 5 Shots of liquor per week     Comment: past hx of alcohol abuse  "sober for 4 years"   • Drug use: Yes     Types: Marijuana       Review of Systems   Constitutional: Negative for chills and fever  HENT: Negative  Negative for sore throat  Respiratory: Negative for cough and shortness of breath  Cardiovascular: Negative for chest pain, palpitations and leg swelling  Gastrointestinal: Positive for abdominal pain and nausea  Negative for blood in stool, constipation, diarrhea and vomiting  Genitourinary: Negative for dysuria, hematuria, vaginal bleeding and vaginal discharge  Musculoskeletal: Negative for back pain and neck pain  Skin: Negative for color change, pallor and rash  Neurological: Negative for dizziness, speech difficulty, weakness, light-headedness and numbness  Psychiatric/Behavioral: Negative for confusion     All other systems reviewed and are negative  Physical Exam  Physical Exam  Vitals and nursing note reviewed  Constitutional:       General: She is not in acute distress  Appearance: Normal appearance  She is well-developed, well-groomed and normal weight  She is not ill-appearing or diaphoretic  HENT:      Head: Normocephalic and atraumatic  Right Ear: External ear normal       Left Ear: External ear normal       Nose: Nose normal       Mouth/Throat:      Mouth: Mucous membranes are moist       Pharynx: Oropharynx is clear  Eyes:      Conjunctiva/sclera: Conjunctivae normal    Cardiovascular:      Rate and Rhythm: Normal rate and regular rhythm  Heart sounds: Normal heart sounds  Pulmonary:      Effort: Pulmonary effort is normal       Breath sounds: Normal breath sounds  No wheezing, rhonchi or rales  Abdominal:      General: Abdomen is flat  Bowel sounds are increased  Palpations: Abdomen is soft  Tenderness: There is abdominal tenderness in the epigastric area  There is no guarding or rebound  Hernia: A hernia is present  Hernia is present in the ventral area  Musculoskeletal:         General: Normal range of motion  Cervical back: Normal range of motion  Right lower leg: No edema  Left lower leg: No edema  Skin:     General: Skin is warm and dry  Coloration: Skin is not jaundiced or pale  Findings: No rash  Neurological:      General: No focal deficit present  Mental Status: She is alert and oriented to person, place, and time  Motor: No weakness  Psychiatric:         Mood and Affect: Mood normal          Behavior: Behavior is cooperative           Vital Signs  ED Triage Vitals   Temperature Pulse Respirations Blood Pressure SpO2   10/30/22 1224 10/30/22 1225 10/30/22 1225 10/30/22 1225 10/30/22 1225   98 4 °F (36 9 °C) 63 16 140/65 96 %      Temp Source Heart Rate Source Patient Position - Orthostatic VS BP Location FiO2 (%)   10/30/22 1224 10/30/22 1225 10/30/22 1225 10/30/22 1225 --   Temporal Monitor Lying Right arm       Pain Score       10/30/22 1225       8           Vitals:    10/30/22 1225 10/30/22 1624   BP: 140/65 148/80   Pulse: 63 61   Patient Position - Orthostatic VS: Lying Lying         Visual Acuity      ED Medications  Medications   dextrose 5 % and sodium chloride 0 45 % infusion (75 mL/hr Intravenous New Bag 10/30/22 1711)   ondansetron (ZOFRAN) injection 4 mg (has no administration in time range)   heparin (porcine) subcutaneous injection 5,000 Units (has no administration in time range)   ibuprofen (MOTRIN) tablet 400 mg (has no administration in time range)   ketorolac (TORADOL) injection 15 mg (has no administration in time range)   oxyCODONE (ROXICODONE) IR tablet 5 mg (has no administration in time range)   HYDROmorphone HCl (DILAUDID) injection 0 2 mg (has no administration in time range)   fentanyl citrate (PF) 100 MCG/2ML 50 mcg (50 mcg Intravenous Given 10/30/22 1347)   ondansetron (ZOFRAN) injection 4 mg (4 mg Intravenous Given 10/30/22 1348)   iohexol (OMNIPAQUE) 350 MG/ML injection (SINGLE-DOSE) 100 mL (100 mL Intravenous Given 10/30/22 1439)   barium (READI-CAT 2) suspension 900 mL (900 mL Oral Given 10/30/22 1439)   fentanyl citrate (PF) 100 MCG/2ML 50 mcg (50 mcg Intravenous Given 10/30/22 1619)       Diagnostic Studies  Results Reviewed     Procedure Component Value Units Date/Time    Platelet count [[id number]]  (Normal) Collected: 10/30/22 1715    Lab Status: Final result Specimen: Blood from Arm, Left Updated: 10/30/22 1722     Platelets 315 Thousands/uL      MPV 11 5 fL     UA w Reflex to Microscopic w Reflex to Culture [[id number]] Collected: 10/30/22 1510    Lab Status: Final result Specimen: Urine, Clean Catch Updated: 10/30/22 1526     Color, UA Yellow     Clarity, UA Clear     Specific Gravity, UA 1 010     pH, UA 7 0     Leukocytes, UA Negative     Nitrite, UA Negative     Protein, UA Negative mg/dl      Glucose, UA Negative mg/dl      Ketones, UA Negative mg/dl      Urobilinogen, UA <2 0 mg/dl      Bilirubin, UA Negative     Occult Blood, UA Negative    Lactic acid [[id number]]  (Normal) Collected: 10/30/22 1259    Lab Status: Final result Specimen: Blood from Arm, Left Updated: 10/30/22 1450     LACTIC ACID 0 7 mmol/L     Narrative:      Result may be elevated if tourniquet was used during collection      Comprehensive metabolic panel [[id number]] Collected: 10/30/22 1259    Lab Status: Final result Specimen: Blood from Arm, Left Updated: 10/30/22 1350     Sodium 140 mmol/L      Potassium 3 7 mmol/L      Chloride 104 mmol/L      CO2 30 mmol/L      ANION GAP 6 mmol/L      BUN 12 mg/dL      Creatinine 0 91 mg/dL      Glucose 81 mg/dL      Calcium 8 4 mg/dL      AST 10 U/L      ALT 14 U/L      Alkaline Phosphatase 72 U/L      Total Protein 7 2 g/dL      Albumin 3 5 g/dL      Total Bilirubin 0 60 mg/dL      eGFR 75 ml/min/1 73sq m     Narrative:      [place] guidelines for Chronic Kidney Disease (CKD):   •  Stage 1 with normal or high GFR (GFR > 90 mL/min/1 73 square meters)  •  Stage 2 Mild CKD (GFR = 60-89 mL/min/1 73 square meters)  •  Stage 3A Moderate CKD (GFR = 45-59 mL/min/1 73 square meters)  •  Stage 3B Moderate CKD (GFR = 30-44 mL/min/1 73 square meters)  •  Stage 4 Severe CKD (GFR = 15-29 mL/min/1 73 square meters)  •  Stage 5 End Stage CKD (GFR <15 mL/min/1 73 square meters)  Note: GFR calculation is accurate only with a steady state creatinine    Lipase [[id number]]  (Normal) Collected: 10/30/22 1259    Lab Status: Final result Specimen: Blood from Arm, Left Updated: 10/30/22 1327     Lipase 138 u/L     CBC and differential [[id number]] Collected: 10/30/22 1259    Lab Status: Final result Specimen: Blood from Arm, Left Updated: 10/30/22 1311     WBC 8 17 Thousand/uL      RBC 4 46 Million/uL      Hemoglobin 14 0 g/dL      Hematocrit 43 3 %      MCV 97 fL      MCH 31 4 pg      MCHC 32 3 g/dL RDW 12 5 %      MPV 11 5 fL      Platelets 774 Thousands/uL      nRBC 0 /100 WBCs      Neutrophils Relative 69 %      Immat GRANS % 0 %      Lymphocytes Relative 24 %      Monocytes Relative 6 %      Eosinophils Relative 1 %      Basophils Relative 0 %      Neutrophils Absolute 5 52 Thousands/µL      Immature Grans Absolute 0 02 Thousand/uL      Lymphocytes Absolute 1 99 Thousands/µL      Monocytes Absolute 0 51 Thousand/µL      Eosinophils Absolute 0 10 Thousand/µL      Basophils Absolute 0 03 Thousands/µL                  CT abdomen pelvis with contrast   Final Result by Romel Mancia DO (10/30 4631)   1  Supraumbilical ventral hernia containing a segment of transverse colon which demonstrates wall thickening  There is trace fluid within the hernia sac as well as haziness of the contained fat  Findings overall are concerning for strangulation of the    involved portion of transverse colon  Surgical consultation is recommended  2   Moderate colonic stool burden within the ascending colon with fecalization of several loops of distal ileum  Findings are favored represent delayed transit/partial obstruction secondary to findings described in impression #1       3   Status post Adilson-en-Y gastric bypass  I personally discussed this study with Shahana Richard on 10/30/2022 at 3:52 PM                Workstation performed: [id number]                    Procedures  ECG 12 Lead Documentation Only    Date/Time: 10/30/2022 1:10 PM  Performed by: Jane Grossman PA-C  Authorized by: Jane Grossman PA-C     Indications / Diagnosis:  Epigastric pain  Patient location:  ED  Previous ECG:     Previous ECG:  Compared to current    Comparison ECG info:  T wave inversion no longer present in inferior, and anterolateral leads       Similarity:  Changes noted  Rate:     ECG rate:  56    ECG rate assessment: bradycardic    Rhythm:     Rhythm: sinus bradycardia    Conduction:     Conduction: normal    ST segments:     ST segments:  Normal  T waves:     T waves: normal               ED Course  ED Course as of 10/30/22 1724   Sun Oct 30, 2022   1604 General surgery paged concerning abnormal CT scan  1616 Surgical AP will be in to see patient  [id number] Surgical AP currently in room with patient  SBIRT 22yo+    Flowsheet Row Most Recent Value   SBIRT (23 yo +)    In order to provide better care to our patients, we are screening all of our patients for alcohol and drug use  Would it be okay to ask you these screening questions? Yes Filed at: 10/30/2022 1355   Initial Alcohol Screen: US AUDIT-C     1  How often do you have a drink containing alcohol? 0 Filed at: 10/30/2022 1355   2  How many drinks containing alcohol do you have on a typical day you are drinking? 0 Filed at: 10/30/2022 1355   3a  Male UNDER 65: How often do you have five or more drinks on one occasion? 0 Filed at: 10/30/2022 1355   3b  FEMALE Any Age, or MALE 65+: How often do you have 4 or more drinks on one occassion? 0 Filed at: 10/30/2022 1355   Audit-C Score 0 Filed at: 10/30/2022 1355   MAURY: How many times in the past year have you    Used an illegal drug or used a prescription medication for non-medical reasons? Never Filed at: 10/30/2022 1355                    MDM  Number of Diagnoses or Management Options  Abdominal pain: new and requires workup  Constipation: new and requires workup  Strangulated epigastric hernia: new and requires workup  Diagnosis management comments: Patient with abdominal pain, painful hernia, will order labs, CT scan to r/o obstruction or strangulated hernia  Patient's hernia able to be reduced by surgical AP, will admit for observation          Amount and/or Complexity of Data Reviewed  Clinical lab tests: ordered and reviewed  Tests in the radiology section of CPT®: ordered and reviewed    Patient Progress  Patient progress: stable      Disposition  Final diagnoses: Abdominal pain   Strangulated epigastric hernia   Constipation     Time reflects when diagnosis was documented in both MDM as applicable and the Disposition within this note     Time User Action Codes Description Comment    10/30/2022  4:17 PM Chelsea Ty Add [R10 9] Abdominal pain     10/30/2022  4:17 PM [place] Ty Add [K43 6] Strangulated epigastric hernia     10/30/2022  4:17 PM Chelsea Ty Add [K59 00] Constipation       ED Disposition     ED Disposition   Admit    Condition   Stable    Date/Time   Sun Oct 30, 2022  4:59 PM    Comment   Case was discussed with Jose Antonio Johnson and the patient's admission status was agreed to be Admission Status: observation status to the service of Dr Oralia Ramos   Follow-up Information    None         Patient's Medications   Discharge Prescriptions    No medications on file       No discharge procedures on file      PDMP Review     None          ED Provider  Electronically Signed by           Paul Guaman PA-C  10/30/22 9031

## 2022-10-31 VITALS
RESPIRATION RATE: 18 BRPM | WEIGHT: 260.14 LBS | SYSTOLIC BLOOD PRESSURE: 129 MMHG | BODY MASS INDEX: 43.34 KG/M2 | OXYGEN SATURATION: 100 % | TEMPERATURE: 98.1 F | HEART RATE: 61 BPM | HEIGHT: 65 IN | DIASTOLIC BLOOD PRESSURE: 55 MMHG

## 2022-10-31 LAB
ANION GAP SERPL CALCULATED.3IONS-SCNC: 6 MMOL/L (ref 4–13)
BUN SERPL-MCNC: 9 MG/DL (ref 5–25)
CALCIUM SERPL-MCNC: 7.9 MG/DL (ref 8.3–10.1)
CHLORIDE SERPL-SCNC: 103 MMOL/L (ref 96–108)
CO2 SERPL-SCNC: 26 MMOL/L (ref 21–32)
CREAT SERPL-MCNC: 0.81 MG/DL (ref 0.6–1.3)
ERYTHROCYTE [DISTWIDTH] IN BLOOD BY AUTOMATED COUNT: 12.2 % (ref 11.6–15.1)
GFR SERPL CREATININE-BSD FRML MDRD: 86 ML/MIN/1.73SQ M
GLUCOSE P FAST SERPL-MCNC: 134 MG/DL (ref 65–99)
GLUCOSE SERPL-MCNC: 134 MG/DL (ref 65–140)
HCT VFR BLD AUTO: 38.1 % (ref 34.8–46.1)
HGB BLD-MCNC: 13 G/DL (ref 11.5–15.4)
LACTATE SERPL-SCNC: 0.8 MMOL/L (ref 0.5–2)
MCH RBC QN AUTO: 32 PG (ref 26.8–34.3)
MCHC RBC AUTO-ENTMCNC: 34.1 G/DL (ref 31.4–37.4)
MCV RBC AUTO: 94 FL (ref 82–98)
PLATELET # BLD AUTO: 154 THOUSANDS/UL (ref 149–390)
PMV BLD AUTO: 11.3 FL (ref 8.9–12.7)
POTASSIUM SERPL-SCNC: 3.4 MMOL/L (ref 3.5–5.3)
RBC # BLD AUTO: 4.06 MILLION/UL (ref 3.81–5.12)
SODIUM SERPL-SCNC: 135 MMOL/L (ref 135–147)
WBC # BLD AUTO: 3.23 THOUSAND/UL (ref 4.31–10.16)

## 2022-10-31 RX ORDER — POTASSIUM CHLORIDE 20 MEQ/1
20 TABLET, EXTENDED RELEASE ORAL ONCE
Status: COMPLETED | OUTPATIENT
Start: 2022-10-31 | End: 2022-10-31

## 2022-10-31 RX ADMIN — ONDANSETRON 4 MG: 2 INJECTION INTRAMUSCULAR; INTRAVENOUS at 00:28

## 2022-10-31 RX ADMIN — IBUPROFEN 400 MG: 400 TABLET, FILM COATED ORAL at 10:52

## 2022-10-31 RX ADMIN — HEPARIN SODIUM 5000 UNITS: 5000 INJECTION INTRAVENOUS; SUBCUTANEOUS at 04:41

## 2022-10-31 RX ADMIN — DEXTROSE AND SODIUM CHLORIDE 75 ML/HR: 5; .45 INJECTION, SOLUTION INTRAVENOUS at 04:53

## 2022-10-31 RX ADMIN — OXYCODONE HYDROCHLORIDE 5 MG: 5 TABLET ORAL at 04:44

## 2022-10-31 RX ADMIN — IBUPROFEN 400 MG: 400 TABLET, FILM COATED ORAL at 00:56

## 2022-10-31 RX ADMIN — POTASSIUM CHLORIDE 20 MEQ: 1500 TABLET, EXTENDED RELEASE ORAL at 10:52

## 2022-10-31 NOTE — PROGRESS NOTES
Progress Note - General Surgery   Candy Choi 52 y o  female MRN: [id number]  Unit/Bed#: -01 Encounter: [id number]    Assessment/Plan:    Incarcerated incisional/ventral hernia  -known history of incisional hernia  -seen in past for evaluation by Dr Felicita Wang but was planning to lose weight prior to surgical repair  -hernia reduced in ED last evening  -patient doing well this morning, pain improved, tolerating clear liquids, passing flatus  -will advance to surgical soft diet this morning-plan for discharge if tolerates diet  -outpatient follow-up for elective repair  Discussed with patient in detail  All questions answered    Hypokalemia  -replaced    Morbid obesity   -s/p gastric bypass 2001 at [facility]  -70 lb weight loss maintained  -continue weight loss program    History of alcohol abuse  -patient now sober  -continue abstinence    Asthma, Anxiety  -continue home medications          Subjective/Objective   Chief Complaint:  Abdominal pain    Subjective:  Abdominal pain for 3 days, worsening last evening, now resolved after hernia reduction  Denies any nausea or vomiting  Tolerating clear liquids  Out of bed and ambulating  Objective:     Blood pressure 129/55, pulse 61, temperature 98 1 °F (36 7 °C), temperature source Oral, resp  rate 18, height 5' 5" (1 651 m), weight 118 kg (260 lb 2 3 oz), SpO2 100 %  ,Body mass index is 43 29 kg/m²        Intake/Output Summary (Last 24 hours) at 10/31/2022 0902  Last data filed at 10/31/2022 0601  Gross per 24 hour   Intake 125 ml   Output 390 ml   Net -265 ml       Invasive Devices  Report    Peripheral Intravenous Line  Duration           Peripheral IV 10/30/22 Left Antecubital <1 day          Drain  Duration           Ureteral Internal Stent Left ureter 6 Fr  51 days                Physical Exam:   General appearance: alert and oriented, in no acute distress  Head: Normocephalic, without obvious abnormality, atraumatic, sclerae anicteric, mucous membranes moist  Neck: no JVD and supple, symmetrical, trachea midline  Lungs: clear to auscultation, no wheezes or rales  Heart:   Regular rate and rhythm, S1-S2 normal, no murmur  Abdomen:   Obese, soft, hernia reduced with palpable defect with mild tenderness, active bowel sounds  Extremities:   No edema, redness or tenderness in the calves or thighs  Skin: Warm, dry  Nursing notes and vital signs reviewed      Lab, Imaging and other studies:  I have personally reviewed pertinent lab results    , CBC:   Lab Results   Component Value Date    WBC 3 23 (L) 10/31/2022    HGB 13 0 10/31/2022    HCT 38 1 10/31/2022    MCV 94 10/31/2022     10/31/2022    MCH 32 0 10/31/2022    MCHC 34 1 10/31/2022    RDW 12 2 10/31/2022    MPV 11 3 10/31/2022    NRBC 0 10/30/2022   , CMP:   Lab Results   Component Value Date    SODIUM 135 10/31/2022    K 3 4 (L) 10/31/2022     10/31/2022    CO2 26 10/31/2022    BUN 9 10/31/2022    CREATININE 0 81 10/31/2022    CALCIUM 7 9 (L) 10/31/2022    AST 10 10/30/2022    ALT 14 10/30/2022    ALKPHOS 72 10/30/2022    EGFR 86 10/31/2022     VTE Pharmacologic Prophylaxis: Heparin  VTE Mechanical Prophylaxis: sequential compression device     Jerman Arevalo

## 2022-10-31 NOTE — H&P (VIEW-ONLY)
Progress Note - General Surgery   Lyndsay Hinesr 52 y o  female MRN: [id number]  Unit/Bed#: -01 Encounter: [id number]    Assessment/Plan:    Incarcerated incisional/ventral hernia  -known history of incisional hernia  -seen in past for evaluation by Dr Yaneli Ramirez but was planning to lose weight prior to surgical repair  -hernia reduced in ED last evening  -patient doing well this morning, pain improved, tolerating clear liquids, passing flatus  -will advance to surgical soft diet this morning-plan for discharge if tolerates diet  -outpatient follow-up for elective repair  Discussed with patient in detail  All questions answered    Hypokalemia  -replaced    Morbid obesity   -s/p gastric bypass 2001 at [facility]  -70 lb weight loss maintained  -continue weight loss program    History of alcohol abuse  -patient now sober  -continue abstinence    Asthma, Anxiety  -continue home medications          Subjective/Objective   Chief Complaint:  Abdominal pain    Subjective:  Abdominal pain for 3 days, worsening last evening, now resolved after hernia reduction  Denies any nausea or vomiting  Tolerating clear liquids  Out of bed and ambulating  Objective:     Blood pressure 129/55, pulse 61, temperature 98 1 °F (36 7 °C), temperature source Oral, resp  rate 18, height 5' 5" (1 651 m), weight 118 kg (260 lb 2 3 oz), SpO2 100 %  ,Body mass index is 43 29 kg/m²        Intake/Output Summary (Last 24 hours) at 10/31/2022 0902  Last data filed at 10/31/2022 0601  Gross per 24 hour   Intake 125 ml   Output 390 ml   Net -265 ml       Invasive Devices  Report    Peripheral Intravenous Line  Duration           Peripheral IV 10/30/22 Left Antecubital <1 day          Drain  Duration           Ureteral Internal Stent Left ureter 6 Fr  51 days                Physical Exam:   General appearance: alert and oriented, in no acute distress  Head: Normocephalic, without obvious abnormality, atraumatic, sclerae anicteric, mucous membranes moist  Neck: no JVD and supple, symmetrical, trachea midline  Lungs: clear to auscultation, no wheezes or rales  Heart:   Regular rate and rhythm, S1-S2 normal, no murmur  Abdomen:   Obese, soft, hernia reduced with palpable defect with mild tenderness, active bowel sounds  Extremities:   No edema, redness or tenderness in the calves or thighs  Skin: Warm, dry  Nursing notes and vital signs reviewed      Lab, Imaging and other studies:  I have personally reviewed pertinent lab results    , CBC:   Lab Results   Component Value Date    WBC 3 23 (L) 10/31/2022    HGB 13 0 10/31/2022    HCT 38 1 10/31/2022    MCV 94 10/31/2022     10/31/2022    MCH 32 0 10/31/2022    MCHC 34 1 10/31/2022    RDW 12 2 10/31/2022    MPV 11 3 10/31/2022    NRBC 0 10/30/2022   , CMP:   Lab Results   Component Value Date    SODIUM 135 10/31/2022    K 3 4 (L) 10/31/2022     10/31/2022    CO2 26 10/31/2022    BUN 9 10/31/2022    CREATININE 0 81 10/31/2022    CALCIUM 7 9 (L) 10/31/2022    AST 10 10/30/2022    ALT 14 10/30/2022    ALKPHOS 72 10/30/2022    EGFR 86 10/31/2022     VTE Pharmacologic Prophylaxis: Heparin  VTE Mechanical Prophylaxis: sequential compression device     Quintin Arevalo

## 2022-10-31 NOTE — DISCHARGE INSTRUCTIONS
Incisional Hernia   WHAT YOU NEED TO KNOW:   An incisional hernia is a bulge through the healed incision of a previous surgery in your abdomen  An incisional hernia is usually caused by weakness in the tissues and muscles of your abdomen  The bulge is usually caused by a part of your intestine, but it may also be tissue or fat pushing through the weakness  DISCHARGE INSTRUCTIONS:   Call 911 for any of the following: You have sudden trouble breathing  Seek care immediately if:   You have severe pain  You have bloody bowel movements  You stop having bowel movements or passing gas  Your abdomen is suddenly very hard  Contact your healthcare provider if:   You have a fever  You have nausea and are vomiting  You are constipated  Your hernia has returned  You have a lump, or collection of fluid, under your skin  You have pain that does not go away, even after you take pain medicine  You have questions or concerns about your condition or care  Medicines: You may need any of the following:  NSAIDs , such as ibuprofen, help decrease swelling, pain, and fever  This medicine is available with or without a doctor's order  NSAIDs can cause stomach bleeding or kidney problems in certain people  If you take blood thinner medicine, always ask your healthcare provider if NSAIDs are safe for you  Always read the medicine label and follow directions  Prescription pain medicine  may be given  Ask your how to take this medicine safely  Take your medicine as directed  Contact your healthcare provider if you think your medicine is not helping or if you have side effects  Tell him or her if you are allergic to any medicine  Keep a list of the medicines, vitamins, and herbs you take  Include the amounts, and when and why you take them  Bring the list or the pill bottles to follow-up visits  Carry your medicine list with you in case of an emergency      Prevent another incisional hernia: Maintain a healthy weight  Ask your healthcare provider how much you should weigh  Ask him to help you create a weight loss plan if you are overweight  Ask your healthcare provider what types of food you should eat  Do not strain  when you have a bowel movement  Take an over-the-counter bowel movement softener and drink plenty of water  When you cough, hold a pillow against your incision to prevent strain  Do not smoke  If you smoke, it is never too late to quit  Ask for information if you need help quitting  Wear your support device as directed  You may need to wear a support device, such as an abdominal binder for up to 2 weeks after surgery  This helps decrease pain and the risk of fluid collecting under your skin  Return to your usual activities as directed  Do not lift more than 10 pounds or do strenuous activity for up to 6 weeks  Follow up with your doctor as directed:  Write down your questions so you remember to ask them during your visits  © Copyright [employer] 2022 Information is for End User's use only and may not be sold, redistributed or otherwise used for commercial purposes  All illustrations and images included in CareNotes® are the copyrighted property of [employer]  or [facility] Marylin Weeks   The above information is an  only  It is not intended as medical advice for individual conditions or treatments  Talk to your doctor, nurse or pharmacist before following any medical regimen to see if it is safe and effective for you

## 2022-10-31 NOTE — UTILIZATION REVIEW
Initial Clinical Review    Admission: Date/Time/Statement: 10/30/22 1659 Observation   Admission Orders (From admission, onward)     Ordered        10/30/22 1659  Place in Observation  Once                      Orders Placed This Encounter   Procedures   • Place in Observation     Standing Status:   Standing     Number of Occurrences:   1     Order Specific Question:   Level of Care     Answer:   Med Surg [16]     ED Arrival Information     Expected   -    Arrival   10/30/2022 12:14    Acuity   Urgent            Means of arrival   Walk-In    Escorted by   Spouse    Service   Surgery-General    Admission type   Emergency            Arrival complaint   abdominal pain           Chief Complaint   Patient presents with   • Abdominal Pain     Mid upper abdominal pain x3 days  Pt has a known hernia to this area  +N,-V/D       Initial Presentation: 52 y o  female from home to ED admitted to observation due to supraumbilical ventral incisional hernia  PMH of Adilson-en-Y bypass, incisional hernia  Presented due to epigastric pain starting 3 days prior to arrival   Hernia in that area becomes hard  On exam: bowel sounds increased  Abdominal tenderness in epigastric area  Hernia ventral area  Ct abdomen with concern of strangulation of hernia  Delayed transit/partial obstruction  In the ED given 2 doses of IV Fentanyl, IV Zofran and started on IVF  Hernia reduced  Plan is monitor for bowel function, serial abdominal exams, clears, IVF, analgesia as needed       ED Triage Vitals   Temperature Pulse Respirations Blood Pressure SpO2   10/30/22 1224 10/30/22 1225 10/30/22 1225 10/30/22 1225 10/30/22 1225   98 4 °F (36 9 °C) 63 16 140/65 96 %      Temp Source Heart Rate Source Patient Position - Orthostatic VS BP Location FiO2 (%)   10/30/22 1224 10/30/22 1225 10/30/22 1225 10/30/22 1225 --   Temporal Monitor Lying Right arm       Pain Score       10/30/22 1225       8          Wt Readings from Last 1 Encounters:   10/30/22 118 kg (260 lb 2 3 oz)     Additional Vital Signs:   10/30/22 2222 98 5 °F (36 9 °C) 64 -- 119/72 90 98 % None (Room air) Lying   10/30/22 1803 98 1 °F (36 7 °C) 58 18 115/64 -- -- -- Sitting   10/30/22 1624 -- 61 16 148/80 -- 96 % None (Room air)        Pertinent Labs/Diagnostic Test Results:   CT abdomen pelvis with contrast   Final Result by Josefina Case DO (10/30 9625)   1  Supraumbilical ventral hernia containing a segment of transverse colon which demonstrates wall thickening  There is trace fluid within the hernia sac as well as haziness of the contained fat  Findings overall are concerning for strangulation of the    involved portion of transverse colon  Surgical consultation is recommended  2   Moderate colonic stool burden within the ascending colon with fecalization of several loops of distal ileum  Findings are favored represent delayed transit/partial obstruction secondary to findings described in impression #1       3   Status post Adilson-en-Y gastric bypass        I personally discussed this study with Lieutenant Baez on 10/30/2022 at 3:52 PM                Workstation performed: [id number]           10/30/22 ecg - Previous ECG:  Compared to current     Comparison ECG info:  T wave inversion no longer present in inferior,   and anterolateral leads      Similarity:  Changes noted   Rate:     ECG rate:  56     ECG rate assessment: bradycardic     Rhythm:     Rhythm: sinus bradycardia     Conduction:     Conduction: normal     ST segments:     ST segments:  Normal   T waves:     T waves: normal    Results from last 7 days   Lab Units 10/31/22  0454 10/30/22  1715 10/30/22  1259   WBC Thousand/uL 3 23*  --  8 17   HEMOGLOBIN g/dL 13 0  --  14 0   HEMATOCRIT % 38 1  --  43 3   PLATELETS Thousands/uL 154 176 183   NEUTROS ABS Thousands/µL  --   --  5 52     Results from last 7 days   Lab Units 10/31/22  0454 10/30/22  1259   SODIUM mmol/L 135 140   POTASSIUM mmol/L 3 4* 3 7   CHLORIDE mmol/L 103 104   CO2 mmol/L 26 30   ANION GAP mmol/L 6 6   BUN mg/dL 9 12   CREATININE mg/dL 0 81 0 91   EGFR ml/min/1 73sq m 86 75   CALCIUM mg/dL 7 9* 8 4     Results from last 7 days   Lab Units 10/30/22  1259   AST U/L 10   ALT U/L 14   ALK PHOS U/L 72   TOTAL PROTEIN g/dL 7 2   ALBUMIN g/dL 3 5   TOTAL BILIRUBIN mg/dL 0 60     Results from last 7 days   Lab Units 10/31/22  0454 10/30/22  1259   GLUCOSE RANDOM mg/dL 134 81     Results from last 7 days   Lab Units 10/31/22  0454 10/30/22  1259   LACTIC ACID mmol/L 0 8 0 7     Results from last 7 days   Lab Units 10/30/22  1259   LIPASE u/L 138     Results from last 7 days   Lab Units 10/30/22  1510   CLARITY UA  Clear   COLOR UA  Yellow   SPEC GRAV UA  1 010   PH UA  7 0   GLUCOSE UA mg/dl Negative   KETONES UA mg/dl Negative   BLOOD UA  Negative   PROTEIN UA mg/dl Negative   NITRITE UA  Negative   BILIRUBIN UA  Negative   UROBILINOGEN UA (BE) mg/dl <2 0   LEUKOCYTES UA  Negative       ED Treatment:   Medication Administration from 10/30/2022 1213 to 10/30/2022 1803       Date/Time Order Dose Route Action Comments     10/30/2022 1347 fentanyl citrate (PF) 100 MCG/2ML 50 mcg 50 mcg Intravenous Given      10/30/2022 1348 ondansetron (ZOFRAN) injection 4 mg 4 mg Intravenous Given      10/30/2022 1619 fentanyl citrate (PF) 100 MCG/2ML 50 mcg 50 mcg Intravenous Given      10/30/2022 1711 dextrose 5 % and sodium chloride 0 45 % infusion 75 mL/hr Intravenous New Bag      10/30/2022 1727 heparin (porcine) subcutaneous injection 5,000 Units 5,000 Units Subcutaneous Given         Past Medical History:   Diagnosis Date   • Alcohol abuse 01/30/2019    Sober per pt x4 years   • Anemia    • Anesthesia     per pt "always wakes up ornery-- tries to pull tube out--sit up right away"   • Anxiety    • Asthma     sports induced has not used inhaler in 10 years or so   • Bariatric surgery status     roun y gastric bypass--- in 2002 or so   • Blood in urine    • BP (high blood pressure)     not diagnosed but recently with kidney stone   • Burning with urination    • Depression    • Diarrhea due to malabsorption 02/21/2019   • Fatty liver 03/21/2019   • Hiatal hernia    • Kidney stone    • Left ACL tear     MCL and PCL-has had for 15 yrs or so   • Left flank pain    • Morbid obesity ([facility] [address] ) 09/27/2018   • Pancreatitis     per pt had it in the past   • Personal history of COVID-19     approx 2 months ago-- recovered at home   • Spinal headache    • Tooth disorder     "one tooth hole in it"   • Wears glasses      Present on Admission:  • Ventral hernia without obstruction or gangrene      Admitting Diagnosis: Strangulated epigastric hernia [K43 6]  Abdominal pain [R10 9]  Constipation [K59 00]  Age/Sex: 52 y o  female  Admission Orders:  Scheduled Medications:  heparin (porcine), 5,000 Units, Subcutaneous, Q8H [address]      Continuous IV Infusions:  dextrose 5 % and sodium chloride 0 45 %, 75 mL/hr, Intravenous, Continuous      PRN Meds:  albuterol, 2 puff, Inhalation, Q4H PRN  HYDROmorphone, 0 5 mg, Intravenous, Q3H PRN - used x 1 10/30/22    ibuprofen, 400 mg, Oral, Q6H PRN - used x 1 10/31/22   ondansetron, 4 mg, Intravenous, Q6H PRN - used x 1 10/31/22   oxybutynin, 5 mg, Oral, TID PRN  oxyCODONE, 10 mg, Oral, Q4H PRN  oxyCODONE, 5 mg, Oral, Q4H PRN  phenazopyridine, 100 mg, Oral, TID PRN    10/30/22 clears       Network Utilization Review Department  ATTENTION: Please call with any questions or concerns to [phone] and carefully listen to the prompts so that you are directed to the right person  All voicemails are confidential   Fern Jeffrey all requests for admission clinical reviews, approved or denied determinations and any other requests to dedicated fax number below belonging to the campus where the patient is receiving treatment   List of dedicated fax numbers for the Facilities:  FACILITY NAME UR FAX NUMBER   ADMISSION DENIALS (Administrative/Medical Necessity) [phone]   PARENT [facility]ILD [facility] (Maternity/NICU/Pediatrics) [address] [place] [address] [phone]   [address] [place] [address] [facility] BridgesLos Angeles County High [facility] Hospitalgreg [address] [facility] 310 [facility] 134 145 [facility] [phone]

## 2022-10-31 NOTE — H&P
I supervised the Advanced Practitioner  ? I performed, in its entirety, the history, exam and assessment/plan component of the visit  I agree with the Advanced Practitioner's note with the following additions/exceptions:      Hernia reduced at bedside  Pain improved  Will monitor overnight given that hernia sac was transverse colon containing  If she is doing well in AM, can advance diet, and follow-up outpatient for elective repair  Guido Desouza MD 10/30/22               H&P - General Surgery   Chato Barron 52 y o  female MRN: [id number]  Unit/Bed#: -01 Encounter: [id number]  Assessment and Plan  49-year-old female with supraumbilical ventral incisional hernia  - patient with history of Adilson-en-Y gastric bypass in 2001 at [facility]  - patient with known long-standing incisional hernia  - afebrile, vital signs stable  - no leukocytosis  - lactic acid, 0 7  - CT abdomen pelvis impression  1  Supraumbilical ventral hernia containing a segment of transverse colon which demonstrates wall thickening  There is trace fluid within the hernia sac as well as haziness of the contained fat  Findings overall are concerning for strangulation of the   involved portion of transverse colon  Surgical consultation is recommended  2   Moderate colonic stool burden within the ascending colon with fecalization of several loops of distal ileum  Findings are favored represent delayed transit/partial obstruction secondary to findings described in impression #1   3   Status post Adilson-en-Y gastric bypass    - on initial abdominal exam, visualized bold of hernia in supraumbilical region, soft overlying tissue with hardened area in the supraumbilical region, TTP over hernia and mild TTP over RLQ and LLQ  - patient received dose to pain medication and was placed in Trendelenburg position, hernia was reduced with patient expressing immediate improvement in pain; upon sitting upright, there was no supraumbilical bulge visualized; area of hernia soft and reduced  - admit to observation  - monitor for bowel function, abdominal exams, vitals, labs  - start clear liquid diet  - IV fluids until patient has adequate oral intake  - analgesia p r n    -DVT prophylaxis  - at discharge, will provide general surgery contact information to discuss hernia repair on an elective basis  - all questions answered  - d/w Dr Miguel Ángel Turner  - reports her asthma sports induced  - no recent inhaler use  - albuterol inhaler prn wheezing    Anxitey   - continue home Prozac 20 mg    History of alcohol abuse  - currently 5 years sober    History of Present Illness   Chief Complaint:  Hernia that is painful and hard     HPI:  Prudencio Duarte is a 52 y o  female with past medical history significant for Adilson-en-Y gastric bypass surgery in 2001, laparoscopic cholecystectomy in 2019, asthma, and anxiety who initially presented to ED with complaints of abdominal pain worsening over the course last 3 days  Patient states that she has had a longstanding known ventral hernia at her incision site from her Adilson-en-Y gastric bypass surgery  She states that at times the area of the hernia becomes hard and she seems to get distended at that area which typically self-resolve  Starting 3 days ago she began to have similar symptoms but the pain has continued to worsen and hernia site remains hard  She states that it began feeling like gas pain and now the gas seems to accumulate at the area of hernia  She reports feelings of nausea before presenting to the ED today  Last ate around 11:00 a m  [place] She states that she has had decreased flatus and feels somewhat bloated  Over last week was having frequent stools but within the last day she has not been having bowel movements  She states that she has never been able to reduce her hernia in the past   Reports episodes of diaphoresis last night    Abdominal surgeries significant for Adilson-en-Y gastric bypass (2001) and laparoscopic cholecystectomy (2019)  Patient has never had a small bowel obstruction past     The only time patient has seen a general surgeon in the past was to have her cholecystectomy with Dr Raghu Moura in 2019  Patient discussed with Dr Raghu Moura the possibility of having the ventral hernia repaired and weight loss was also part of their discussion  Patient had recently considered making an appointment to discuss hernia repair  Review of Systems   Constitutional: Positive for diaphoresis (Last night)  Negative for chills and fever  HENT: Negative for sore throat  Respiratory: Negative for shortness of breath  Cardiovascular: Negative for chest pain  Gastrointestinal: Positive for abdominal pain and nausea  Endocrine: Negative for polyuria  Genitourinary: Negative for dysuria and frequency  Musculoskeletal: Negative for arthralgias  Skin: Negative for color change  Psychiatric/Behavioral: Negative for confusion         Historical Information   Past Medical History:   Diagnosis Date   • Alcohol abuse 01/30/2019    Sober per pt x4 years   • Anemia    • Anesthesia     per pt "always wakes up ornery-- tries to pull tube out--sit up right away"   • Anxiety    • Asthma     sports induced has not used inhaler in 10 years or so   • Bariatric surgery status     roun y gastric bypass--- in 2002 or so   • Blood in urine    • BP (high blood pressure)     not diagnosed but recently with kidney stone   • Burning with urination    • Depression    • Diarrhea due to malabsorption 02/21/2019   • Fatty liver 03/21/2019   • Hiatal hernia    • Kidney stone    • Left ACL tear     MCL and PCL-has had for 15 yrs or so   • Left flank pain    • Morbid obesity ([facility] [address] ) 09/27/2018   • Pancreatitis     per pt had it in the past   • Personal history of COVID-19     approx 2 months ago-- recovered at home   • Spinal headache    • Tooth disorder     "one tooth hole in it"   • Wears glasses      Past Surgical History: Procedure Laterality Date   • CHOLECYSTECTOMY LAPAROSCOPIC N/A 02/06/2019    Procedure: CHOLECYSTECTOMY LAPAROSCOPIC with ioc;  Surgeon: Sebastian Singleton MD;  Location: [facility];  Service: General   • DILATION AND CURETTAGE OF UTERUS     • FL RETROGRADE PYELOGRAM  08/09/2022   • FL RETROGRADE PYELOGRAM  9/9/2022   • GASTRIC BYPASS     • NY CYSTO/URETERO W/LITHOTRIPSY &INDWELL STENT INSRT Left 9/9/2022    Procedure: CYSTO USCOPE W/ LASER, RETROGRADE AND STENT exchange, basket stone extraction;  Surgeon: Iftikhar Rangel MD;  Location: [facility];  Service: Urology   • NY CYSTOURETHROSCOPY,URETER CATHETER Left 08/09/2022    Procedure: CYSTOSCOPY RETROGRADE PYELOGRAM WITH INSERTION STENT URETERAL;  Surgeon: Iftikhar Rangel MD;  Location: [facility];  Service: Urology   • WISDOM TOOTH EXTRACTION       Social History   Social History     Substance and Sexual Activity   Alcohol Use Not Currently   • Alcohol/week: 5 0 standard drinks   • Types: 5 Shots of liquor per week    Comment: past hx of alcohol abuse  "sober for 4 years"     Social History     Substance and Sexual Activity   Drug Use Yes   • Types: Marijuana     Social History     Tobacco Use   Smoking Status Never Smoker   Smokeless Tobacco Never Used     E-Cigarette/Vaping   • E-Cigarette Use Never User      E-Cigarette/Vaping Substances     Family History: non-contributory    Meds/Allergies     Allergies   Allergen Reactions   • Augmentin [Amoxicillin-Pot Clavulanate] Rash   • Ciprofloxacin Rash   • Doxycycline Rash   • Morphine Hallucinations     "felt like ants crawling all over"   • Penicillins Rash     "All Cillins"   • Tylenol [Acetaminophen] Other (See Comments)     nosebleeds   • Latex Rash   • Tomato - Food Allergy Other (See Comments)     Tongue irritation       Objective   First Vitals:   Blood Pressure: 140/65 (10/30/22 1225)  Pulse: 63 (10/30/22 1225)  Temperature: 98 4 °F (36 9 °C) (10/30/22 1224)  Temp Source: Temporal (10/30/22 1224)  Respirations: 16 (10/30/22 1225)  Height: 5' 5" (165 1 cm) (10/30/22 1803)  Weight - Scale: 115 kg (254 lb) (10/30/22 1225)  SpO2: 96 % (10/30/22 1225)    Current Vitals:   Blood Pressure: 115/64 (10/30/22 1803)  Pulse: 58 (10/30/22 1803)  Temperature: 98 1 °F (36 7 °C) (10/30/22 1803)  Temp Source: Oral (10/30/22 1803)  Respirations: 18 (10/30/22 1803)  Height: 5' 5" (165 1 cm) (10/30/22 1803)  Weight - Scale: 118 kg (260 lb 2 3 oz) (10/30/22 1803)  SpO2: 96 % (10/30/22 1624)    No intake or output data in the 24 hours ending 10/30/22 2137    Invasive Devices  Report    Peripheral Intravenous Line  Duration           Peripheral IV 10/30/22 Left Antecubital <1 day          Drain  Duration           Ureteral Internal Stent Left ureter 6 Fr  51 days                Physical Exam  Constitutional:       General: She is not in acute distress  Appearance: She is obese  She is not diaphoretic  HENT:      Head: Normocephalic and atraumatic  Right Ear: External ear normal       Left Ear: External ear normal       Nose: Nose normal    Eyes:      Extraocular Movements: Extraocular movements intact  Cardiovascular:      Rate and Rhythm: Normal rate and regular rhythm  Pulmonary:      Effort: Pulmonary effort is normal       Breath sounds: Normal breath sounds  Abdominal:      Tenderness: There is no right CVA tenderness, left CVA tenderness, guarding or rebound  Comments: Initial abdominal exam:  Midline incisional scar extending superior fully from umbilicus, supraumbilical incisional hernia bulge visualized  Obese abdomen  Bowel sounds present, somewhat hyperactive over incisional hernia  TTP over soft overlying tissue with hardened area in the supraumbilical region, TTP over hernia and mild TTP over RLQ and LLQ  Hernia reduced  Serial abdominal exam:  Bowel sounds present  Obese abdomen  No bulge noted  Soft, hernia reduced    Mild tenderness to palpation over supraumbilical hernia, RLQ, LLQ     Musculoskeletal:      Right lower leg: No edema  Left lower leg: No edema  Skin:     General: Skin is warm and dry  Neurological:      General: No focal deficit present  Lab Results: I have personally reviewed pertinent lab results  Imaging: I have personally reviewed pertinent reports  Code Status: Prior  Advance Directive and Living Will:      Power of :    POLST:      Counseling / Coordination of Care  Total floor / unit time spent today 30 minutes  Greater than 50% of total time was spent with the patient and / or family counseling and / or coordination of care  A description of the counseling / coordination of care:  Chart review, physical examination, history taking, discussion with attending physician      Guido Desouza  10/30/2022

## 2022-10-31 NOTE — PLAN OF CARE
Problem: PAIN - ADULT  Goal: Verbalizes/displays adequate comfort level or baseline comfort level  Description: Interventions:  - Encourage patient to monitor pain and request assistance  - Assess pain using appropriate pain scale  - Administer analgesics based on type and severity of pain and evaluate response  - Implement non-pharmacological measures as appropriate and evaluate response  - Consider cultural and social influences on pain and pain management  - Notify physician/advanced practitioner if interventions unsuccessful or patient reports new pain  Outcome: Progressing     Problem: INFECTION - ADULT  Goal: Absence or prevention of progression during hospitalization  Description: INTERVENTIONS:  - Assess and monitor for signs and symptoms of infection  - Monitor lab/diagnostic results  - Monitor all insertion sites, i e  indwelling lines, tubes, and drains  - Monitor endotracheal if appropriate and nasal secretions for changes in amount and color  - [place] appropriate cooling/warming therapies per order  - Administer medications as ordered  - Instruct and encourage patient and family to use good hand hygiene technique  - Identify and instruct in appropriate isolation precautions for identified infection/condition  Outcome: Progressing  Goal: Absence of fever/infection during neutropenic period  Description: INTERVENTIONS:  - Monitor WBC    Outcome: Progressing     Problem: SAFETY ADULT  Goal: Patient will remain free of falls  Description: INTERVENTIONS:  - Educate patient/family on patient safety including physical limitations  - Instruct patient to call for assistance with activity   - Consult OT/PT to assist with strengthening/mobility   - Keep Call bell within reach  - Keep bed low and locked with side rails adjusted as appropriate  - Keep care items and personal belongings within reach  - Initiate and maintain comfort rounds  - Make Fall Risk Sign visible to staff  - Offer Toileting every 2 Hours, in advance of need  - Initiate/Maintain alarm  - Obtain necessary fall risk management equipment:   - Apply yellow socks and bracelet for high fall risk patients  - Consider moving patient to room near nurses station  Outcome: Progressing  Goal: Maintain or return to baseline ADL function  Description: INTERVENTIONS:  -  Assess patient's ability to carry out ADLs; assess patient's baseline for ADL function and identify physical deficits which impact ability to perform ADLs (bathing, care of mouth/teeth, toileting, grooming, dressing, etc )  - Assess/evaluate cause of self-care deficits   - Assess range of motion  - Assess patient's mobility; develop plan if impaired  - Assess patient's need for assistive devices and provide as appropriate  - Encourage maximum independence but intervene and supervise when necessary  - Involve family in performance of ADLs  - Assess for home care needs following discharge   - Consider OT consult to assist with ADL evaluation and planning for discharge  - Provide patient education as appropriate  Outcome: Progressing  Goal: Maintains/Returns to pre admission functional level  Description: INTERVENTIONS:  - Perform BMAT or MOVE assessment daily    - Set and communicate daily mobility goal to care team and patient/family/caregiver     - Collaborate with rehabilitation services on mobility goals if consulted  - Out of bed for toileting  - Record patient progress and toleration of activity level   Outcome: Progressing     Problem: DISCHARGE PLANNING  Goal: Discharge to home or other facility with appropriate resources  Description: INTERVENTIONS:  - Identify barriers to discharge w/patient and caregiver  - Arrange for needed discharge resources and transportation as appropriate  - Identify discharge learning needs (meds, wound care, etc )  - Arrange for interpretive services to assist at discharge as needed  - Refer to Case Management Department for coordinating discharge planning if the patient needs post-hospital services based on physician/advanced practitioner order or complex needs related to functional status, cognitive ability, or social support system  Outcome: Progressing     Problem: Knowledge Deficit  Goal: Patient/family/caregiver demonstrates understanding of disease process, treatment plan, medications, and discharge instructions  Description: Complete learning assessment and assess knowledge base    Interventions:  - Provide teaching at level of understanding  - Provide teaching via preferred learning methods  Outcome: Progressing

## 2022-11-01 LAB
ATRIAL RATE: 56 BPM
P AXIS: 43 DEGREES
PR INTERVAL: 174 MS
QRS AXIS: 49 DEGREES
QRSD INTERVAL: 102 MS
QT INTERVAL: 414 MS
QTC INTERVAL: 399 MS
T WAVE AXIS: 50 DEGREES
VENTRICULAR RATE: 56 BPM

## 2022-11-14 ENCOUNTER — ANESTHESIA EVENT (OUTPATIENT)
Dept: PERIOP | Facility: HOSPITAL | Age: 47
End: 2022-11-14

## 2022-11-14 ENCOUNTER — TELEPHONE (OUTPATIENT)
Dept: SURGERY | Facility: HOSPITAL | Age: 47
End: 2022-11-14

## 2022-11-14 NOTE — TELEPHONE ENCOUNTER
Patient called concerned about constipation prior to surgery scheduled for 11/15/22  Patient was advised to get Magnesium Citrate and Colace immediately and start taking

## 2022-11-14 NOTE — PRE-PROCEDURE INSTRUCTIONS
Pre-Surgery Instructions:   Medication Instructions   • ALPRAZolam (XANAX) 0 25 mg tablet Uses PRN- OK to take day of surgery   • Ascorbic Acid (VITAMIN C PO) Hold day of surgery  • BIOTIN PO Hold day of surgery  • MINOXIDIL FOR WOMEN EX Continue Per normal schedule weekly on Sundays   • Multiple Vitamin (multivitamin) tablet Hold day of surgery  Pre op instructions per My Surgical Experience booklet,medications per anesthesia guidelines and showering instructions per [facility] protocol reviewed-Patient has CHG  Pt  Verbalized understanding of current visitor restrictions  Instructed to call surgeon with any illness,change in health or covid exposure now till DOS  All medications instructed to take DOS are with sips water only  Instructed to avoid all ASA/NSAIDs and OTC Vit/Supp from now until after surgery per anesthesia guidelines  Tylenol ok prn  Pt  Verbalized an understanding of all instructions reviewed and offers no concerns at this time

## 2022-11-15 ENCOUNTER — ANESTHESIA (OUTPATIENT)
Dept: PERIOP | Facility: HOSPITAL | Age: 47
End: 2022-11-15

## 2022-11-15 ENCOUNTER — HOSPITAL ENCOUNTER (OUTPATIENT)
Facility: HOSPITAL | Age: 47
Setting detail: OUTPATIENT SURGERY
Discharge: HOME | End: 2022-11-15
Attending: SURGERY | Admitting: SURGERY

## 2022-11-15 VITALS
DIASTOLIC BLOOD PRESSURE: 70 MMHG | RESPIRATION RATE: 12 BRPM | SYSTOLIC BLOOD PRESSURE: 142 MMHG | HEIGHT: 65 IN | WEIGHT: 241.4 LBS | BODY MASS INDEX: 40.22 KG/M2 | HEART RATE: 72 BPM | TEMPERATURE: 97.4 F | OXYGEN SATURATION: 97 %

## 2022-11-15 DIAGNOSIS — Z98.890 POST-OPERATIVE NAUSEA AND VOMITING: ICD-10-CM

## 2022-11-15 DIAGNOSIS — R11.2 POST-OPERATIVE NAUSEA AND VOMITING: ICD-10-CM

## 2022-11-15 DIAGNOSIS — Z98.890 S/P HERNIA REPAIR: Primary | ICD-10-CM

## 2022-11-15 DIAGNOSIS — Z87.19 S/P HERNIA REPAIR: Primary | ICD-10-CM

## 2022-11-15 PROBLEM — K43.2 INCISIONAL HERNIA, WITHOUT OBSTRUCTION OR GANGRENE: Status: RESOLVED | Noted: 2022-11-15 | Resolved: 2022-11-15

## 2022-11-15 PROBLEM — K43.2 INCISIONAL HERNIA, WITHOUT OBSTRUCTION OR GANGRENE: Status: ACTIVE | Noted: 2022-11-15

## 2022-11-15 LAB
EXT PREGNANCY TEST URINE: NEGATIVE
EXT. CONTROL: NORMAL

## 2022-11-15 DEVICE — VENTRALIGHT ST MESH WITH ECHO PS POSITIONING SYSTEM
Type: IMPLANTABLE DEVICE | Site: ABDOMEN | Status: FUNCTIONAL
Brand: VENTRALIGHT ST MESH WITH ECHO PS POSITIONING SYSTEM

## 2022-11-15 DEVICE — SORBAFIX ABSORBABLE FIXATION SYSTEM 30 ABSORBABLE FASTENERS
Type: IMPLANTABLE DEVICE | Site: ABDOMEN | Status: FUNCTIONAL
Brand: SORBAFIX ABSORBABLE FIXATION SYSTEM

## 2022-11-15 RX ORDER — ACETAMINOPHEN 325 MG/1
650 TABLET ORAL EVERY 6 HOURS PRN
Status: CANCELLED | OUTPATIENT
Start: 2022-11-15

## 2022-11-15 RX ORDER — LABETALOL HYDROCHLORIDE 5 MG/ML
INJECTION, SOLUTION INTRAVENOUS AS NEEDED
Status: DISCONTINUED | OUTPATIENT
Start: 2022-11-15 | End: 2022-11-15

## 2022-11-15 RX ORDER — PROMETHAZINE HYDROCHLORIDE 25 MG/ML
12.5 INJECTION, SOLUTION INTRAMUSCULAR; INTRAVENOUS ONCE AS NEEDED
Status: COMPLETED | OUTPATIENT
Start: 2022-11-15 | End: 2022-11-15

## 2022-11-15 RX ORDER — KETOROLAC TROMETHAMINE 30 MG/ML
INJECTION, SOLUTION INTRAMUSCULAR; INTRAVENOUS AS NEEDED
Status: DISCONTINUED | OUTPATIENT
Start: 2022-11-15 | End: 2022-11-15

## 2022-11-15 RX ORDER — FENTANYL CITRATE 50 UG/ML
INJECTION, SOLUTION INTRAMUSCULAR; INTRAVENOUS AS NEEDED
Status: DISCONTINUED | OUTPATIENT
Start: 2022-11-15 | End: 2022-11-15

## 2022-11-15 RX ORDER — MIDAZOLAM HYDROCHLORIDE 2 MG/2ML
INJECTION, SOLUTION INTRAMUSCULAR; INTRAVENOUS AS NEEDED
Status: DISCONTINUED | OUTPATIENT
Start: 2022-11-15 | End: 2022-11-15

## 2022-11-15 RX ORDER — ONDANSETRON 2 MG/ML
4 INJECTION INTRAMUSCULAR; INTRAVENOUS ONCE AS NEEDED
Status: COMPLETED | OUTPATIENT
Start: 2022-11-15 | End: 2022-11-15

## 2022-11-15 RX ORDER — ACETAMINOPHEN 500 MG
500 TABLET ORAL EVERY 6 HOURS PRN
Qty: 30 TABLET | Refills: 0
Start: 2022-11-15

## 2022-11-15 RX ORDER — ONDANSETRON 4 MG/1
4 TABLET, ORALLY DISINTEGRATING ORAL EVERY 6 HOURS PRN
Qty: 8 TABLET | Refills: 0 | Status: SHIPPED | OUTPATIENT
Start: 2022-11-15

## 2022-11-15 RX ORDER — MEPERIDINE HYDROCHLORIDE 25 MG/ML
12.5 INJECTION INTRAMUSCULAR; INTRAVENOUS; SUBCUTANEOUS
Status: DISCONTINUED | OUTPATIENT
Start: 2022-11-15 | End: 2022-11-15 | Stop reason: HOSPADM

## 2022-11-15 RX ORDER — GLYCOPYRROLATE 0.2 MG/ML
INJECTION INTRAMUSCULAR; INTRAVENOUS AS NEEDED
Status: DISCONTINUED | OUTPATIENT
Start: 2022-11-15 | End: 2022-11-15

## 2022-11-15 RX ORDER — ROCURONIUM BROMIDE 10 MG/ML
INJECTION, SOLUTION INTRAVENOUS AS NEEDED
Status: DISCONTINUED | OUTPATIENT
Start: 2022-11-15 | End: 2022-11-15

## 2022-11-15 RX ORDER — HYDROMORPHONE HCL/PF 1 MG/ML
0.5 SYRINGE (ML) INJECTION
Status: DISCONTINUED | OUTPATIENT
Start: 2022-11-15 | End: 2022-11-15 | Stop reason: HOSPADM

## 2022-11-15 RX ORDER — FENTANYL CITRATE/PF 50 MCG/ML
25 SYRINGE (ML) INJECTION
Status: DISCONTINUED | OUTPATIENT
Start: 2022-11-15 | End: 2022-11-15 | Stop reason: HOSPADM

## 2022-11-15 RX ORDER — SODIUM CHLORIDE, SODIUM LACTATE, POTASSIUM CHLORIDE, CALCIUM CHLORIDE 600; 310; 30; 20 MG/100ML; MG/100ML; MG/100ML; MG/100ML
125 INJECTION, SOLUTION INTRAVENOUS CONTINUOUS
Status: DISCONTINUED | OUTPATIENT
Start: 2022-11-15 | End: 2022-11-15 | Stop reason: HOSPADM

## 2022-11-15 RX ORDER — OXYCODONE HYDROCHLORIDE 5 MG/1
10 TABLET ORAL EVERY 4 HOURS PRN
Status: CANCELLED | OUTPATIENT
Start: 2022-11-15

## 2022-11-15 RX ORDER — LIDOCAINE HYDROCHLORIDE 10 MG/ML
INJECTION, SOLUTION EPIDURAL; INFILTRATION; INTRACAUDAL; PERINEURAL AS NEEDED
Status: DISCONTINUED | OUTPATIENT
Start: 2022-11-15 | End: 2022-11-15

## 2022-11-15 RX ORDER — ONDANSETRON 2 MG/ML
INJECTION INTRAMUSCULAR; INTRAVENOUS AS NEEDED
Status: DISCONTINUED | OUTPATIENT
Start: 2022-11-15 | End: 2022-11-15

## 2022-11-15 RX ORDER — DEXAMETHASONE SODIUM PHOSPHATE 10 MG/ML
INJECTION, SOLUTION INTRAMUSCULAR; INTRAVENOUS AS NEEDED
Status: DISCONTINUED | OUTPATIENT
Start: 2022-11-15 | End: 2022-11-15

## 2022-11-15 RX ORDER — CLINDAMYCIN PHOSPHATE 900 MG/50ML
900 INJECTION INTRAVENOUS ONCE
Status: COMPLETED | OUTPATIENT
Start: 2022-11-15 | End: 2022-11-15

## 2022-11-15 RX ORDER — HYDROMORPHONE HCL 110MG/55ML
PATIENT CONTROLLED ANALGESIA SYRINGE INTRAVENOUS AS NEEDED
Status: DISCONTINUED | OUTPATIENT
Start: 2022-11-15 | End: 2022-11-15

## 2022-11-15 RX ORDER — ONDANSETRON 2 MG/ML
4 INJECTION INTRAMUSCULAR; INTRAVENOUS EVERY 6 HOURS PRN
Status: CANCELLED | OUTPATIENT
Start: 2022-11-15

## 2022-11-15 RX ORDER — PROPOFOL 10 MG/ML
INJECTION, EMULSION INTRAVENOUS AS NEEDED
Status: DISCONTINUED | OUTPATIENT
Start: 2022-11-15 | End: 2022-11-15

## 2022-11-15 RX ORDER — OXYCODONE HYDROCHLORIDE 5 MG/1
5 TABLET ORAL EVERY 4 HOURS PRN
Status: CANCELLED | OUTPATIENT
Start: 2022-11-15

## 2022-11-15 RX ORDER — LIDOCAINE HYDROCHLORIDE 10 MG/ML
0.5 INJECTION, SOLUTION EPIDURAL; INFILTRATION; INTRACAUDAL; PERINEURAL ONCE AS NEEDED
Status: DISCONTINUED | OUTPATIENT
Start: 2022-11-15 | End: 2022-11-15 | Stop reason: HOSPADM

## 2022-11-15 RX ORDER — OXYCODONE HYDROCHLORIDE 5 MG/1
5 TABLET ORAL EVERY 4 HOURS PRN
Qty: 18 TABLET | Refills: 0 | Status: SHIPPED | OUTPATIENT
Start: 2022-11-15 | End: 2022-11-18

## 2022-11-15 RX ORDER — ALBUTEROL SULFATE 2.5 MG/3ML
2.5 SOLUTION RESPIRATORY (INHALATION) ONCE AS NEEDED
Status: DISCONTINUED | OUTPATIENT
Start: 2022-11-15 | End: 2022-11-15 | Stop reason: HOSPADM

## 2022-11-15 RX ADMIN — PROPOFOL 50 MG: 10 INJECTION, EMULSION INTRAVENOUS at 09:00

## 2022-11-15 RX ADMIN — GLYCOPYRROLATE 0.1 MG: 0.2 INJECTION, SOLUTION INTRAMUSCULAR; INTRAVENOUS at 07:55

## 2022-11-15 RX ADMIN — FENTANYL CITRATE 25 MCG: 50 INJECTION INTRAMUSCULAR; INTRAVENOUS at 10:01

## 2022-11-15 RX ADMIN — DEXAMETHASONE SODIUM PHOSPHATE 10 MG: 10 INJECTION, SOLUTION INTRAMUSCULAR; INTRAVENOUS at 07:55

## 2022-11-15 RX ADMIN — HYDROMORPHONE HYDROCHLORIDE 0.5 MG: 2 INJECTION, SOLUTION INTRAMUSCULAR; INTRAVENOUS; SUBCUTANEOUS at 07:55

## 2022-11-15 RX ADMIN — HYDROMORPHONE HYDROCHLORIDE 0.5 MG: 1 INJECTION, SOLUTION INTRAMUSCULAR; INTRAVENOUS; SUBCUTANEOUS at 10:09

## 2022-11-15 RX ADMIN — ONDANSETRON 4 MG: 2 INJECTION INTRAMUSCULAR; INTRAVENOUS at 10:25

## 2022-11-15 RX ADMIN — HYDROMORPHONE HYDROCHLORIDE 0.5 MG: 2 INJECTION, SOLUTION INTRAMUSCULAR; INTRAVENOUS; SUBCUTANEOUS at 09:36

## 2022-11-15 RX ADMIN — ROCURONIUM BROMIDE 50 MG: 10 INJECTION INTRAVENOUS at 07:49

## 2022-11-15 RX ADMIN — PROPOFOL 200 MG: 10 INJECTION, EMULSION INTRAVENOUS at 07:49

## 2022-11-15 RX ADMIN — HYDROMORPHONE HYDROCHLORIDE 0.5 MG: 1 INJECTION, SOLUTION INTRAMUSCULAR; INTRAVENOUS; SUBCUTANEOUS at 10:21

## 2022-11-15 RX ADMIN — SODIUM CHLORIDE, SODIUM LACTATE, POTASSIUM CHLORIDE, AND CALCIUM CHLORIDE: .6; .31; .03; .02 INJECTION, SOLUTION INTRAVENOUS at 07:47

## 2022-11-15 RX ADMIN — PROMETHAZINE HYDROCHLORIDE 12.5 MG: 25 INJECTION INTRAMUSCULAR; INTRAVENOUS at 11:01

## 2022-11-15 RX ADMIN — CLINDAMYCIN IN 5 PERCENT DEXTROSE 900 MG: 18 INJECTION, SOLUTION INTRAVENOUS at 07:47

## 2022-11-15 RX ADMIN — KETOROLAC TROMETHAMINE 30 MG: 30 INJECTION, SOLUTION INTRAMUSCULAR; INTRAVENOUS at 09:29

## 2022-11-15 RX ADMIN — MIDAZOLAM 2 MG: 1 INJECTION INTRAMUSCULAR; INTRAVENOUS at 07:44

## 2022-11-15 RX ADMIN — LABETALOL HYDROCHLORIDE 5 MG: 5 INJECTION, SOLUTION INTRAVENOUS at 09:34

## 2022-11-15 RX ADMIN — FENTANYL CITRATE 50 MCG: 50 INJECTION, SOLUTION INTRAMUSCULAR; INTRAVENOUS at 08:27

## 2022-11-15 RX ADMIN — ONDANSETRON 4 MG: 2 INJECTION INTRAMUSCULAR; INTRAVENOUS at 07:55

## 2022-11-15 RX ADMIN — SUGAMMADEX 219 MG: 100 INJECTION, SOLUTION INTRAVENOUS at 09:29

## 2022-11-15 RX ADMIN — LIDOCAINE HYDROCHLORIDE 50 MG: 10 INJECTION, SOLUTION EPIDURAL; INFILTRATION; INTRACAUDAL; PERINEURAL at 07:49

## 2022-11-15 RX ADMIN — PROPOFOL 50 MG: 10 INJECTION, EMULSION INTRAVENOUS at 09:08

## 2022-11-15 RX ADMIN — FENTANYL CITRATE 100 MCG: 50 INJECTION, SOLUTION INTRAMUSCULAR; INTRAVENOUS at 07:49

## 2022-11-15 RX ADMIN — FENTANYL CITRATE 50 MCG: 50 INJECTION, SOLUTION INTRAMUSCULAR; INTRAVENOUS at 08:15

## 2022-11-15 RX ADMIN — FENTANYL CITRATE 25 MCG: 50 INJECTION INTRAMUSCULAR; INTRAVENOUS at 09:54

## 2022-11-15 NOTE — INTERVAL H&P NOTE
H&P reviewed  After examining the patient I find no changes in the patients condition since the H&P had been written      Vitals:    11/15/22 0659   BP: 140/61   Pulse: 70   Resp: 18   Temp: (!) 97 4 °F (36 3 °C)   SpO2: 99%

## 2022-11-15 NOTE — DISCHARGE INSTRUCTIONS
[facility] Instructions  Dr Eliot Drummond MD, FACS  [phone]    1  General: You will feel pulling sensations around the wound or funny aches and pains around the incisions  This is normal  Even minor surgery is a change in your body and this is your body's way of reacting to it  If you have had abdominal surgery, it may help to support the incision with a small pillow or blanket for comfort when moving or coughing  2  Wound care:  Okay to shower  The glue will fall off over the next week or 2  Use ice for the first 5 days after surgery  Do not use for longer than 20 minutes at a time  Use ice 5 times per day  Wear abdominal binder at all times  May remove for hygiene  May use guaze dressing to cover incisions when wearing abdominal binder    3  Water: You may shower over the wounds  Do not bathe or use a pool or hot tub until cleared by the physician  If you were discharged with a drain, make sure drain site is covered with plastic wrap before showering  4  Activity: You may go up and down stairs, walk as much as you are comfortable, but walk at least 3 times each day  If you have had abdominal surgery, do not lift anything heavier than 15 lbs for the 1st 2 weeks and 25 lbs for weeks 3 and 4      5  Diet: You may resume a regular diet  If you had a same-day surgery or overnight stay surgery, you may wish to eat lightly for a few days: soups, crackers, and sandwiches  You may resume a regular diet when ready  6  Medications: Resume all of your previous medications, unless told otherwise by the doctor  Avoid aspirin products for 2-3 days after the date of surgery  You may, at that time, begin to take them again  Use Tylenol and Ibuprofen for pain control  You may alternate these medications every 3 hours    For example: you may take Tylenol at noon, Ibuprofen at 3:00 p m , and Tylenol again at 6:00 p m , etc   You should use ice to assist with pain control as above  You do not need to take narcotic pain medication unless you are having significant pain  7  Driving: You will need someone to drive you home on the day of surgery or discharge  Do not drive or make any important decisions while on narcotic pain medication or 24 hours and after anesthesia or sedation for surgery  Generally, you may drive when your off all narcotic pain medications and you are comfortable  8  Upset Stomach: You may take Maalox, Tums, or similar items for an upset stomach  If your narcotic pain medication causes an upset stomach, do not take it on an empty stomach  Try taking it with at least some crackers or toast      9  Constipation: Patients often experience constipation after surgery  You may take over-the-counter medication for this, such as Metamucil, Senokot, Dulcolax, milk of magnesia, etc  You may take a suppository unless you have had anorectal surgery such as a procedure on your hemorrhoids  If you experience significant nausea or vomiting after abdominal surgery, call the office before trying any of these medications  10  Call the office: If you are experiencing any of the following: fevers above 101 5°, significant nausea or vomiting, if the wound develops drainage and/or there is excessive redness around the wound, or if you have significant diarrhea or other worsening symptoms  11  Pain: You may be given a prescription for pain medication  This will be sent to your pharmacy prior to discharge  12  Sexual Activity: You may resume sexual activity when you feel ready and comfortable and your incision is sealed and healed without apparent infection risk  13  Urination: If you have not urinated in 6 hours, go directly to the ER for evaluation for urinary retention  14  Follow-up in 2 weeks

## 2022-11-15 NOTE — INTERIM OP NOTE
REPAIR HERNIA INCISIONAL LAPAROSCOPIC WITH MESH, EXTENSIVE LYSIS ADHESIONS  Postoperative Note  PATIENT NAME: Zeny Ibanez  : 1975  MRN: [id number]  [facility] OR ROOM 02    Surgery Date: 11/15/2022    Preop Diagnosis:  Incisional hernia, without obstruction or gangrene [K43 2]    Post-Op Diagnosis Codes:     * Incisional hernia, without obstruction or gangrene [K43 2]  (multiple)    Procedure(s) (LRB):  REPAIR HERNIA INCISIONAL LAPAROSCOPIC WITH MESH (N/A)  EXTENSIVE LYSIS ADHESIONS (N/A)    Surgeon(s) and Role:     * Amrita Jay MD - Primary     * Hayley Arevalo PA-C - Assisting    Specimens:  * No specimens in log *    Estimated Blood Loss:   Minimal    Anesthesia Type:   General ETA    Findings:   As above    Complications:   None    Hernia Surgery Operative Note    Name: Zeny Ibanez    Gender: female    Age: 52 y o  Race:     BMI: Body mass index is 40 17 kg/m²  DIAGNOSIS: No diagnosis found      Diabetes Mellitis: No    Coronary Heart Disease: No    Cancer: No    Steroid Use: No    Tobacco use: No   Last used: never smoked       Alcohol use: No    Location of Hernia: midline incisional hernia  Multiple- range  5 x 5 cm, to 1 x 1 cm  Primary: Yes  Recurrent: No   Number of recurrences:    Access: Laparoscope    Component Separation: No    Mesh:   Yes -  Type: Synthetic   Ventralight ST mesh with ECHO positioning 7 x 9 in    Operative Time: 87 min               SIGNATURE: Claudia Arevalo PA-C   DATE: November 15, 2022   TIME: 9:44 AM

## 2022-11-15 NOTE — ANESTHESIA PREPROCEDURE EVALUATION
Procedure:  REPAIR HERNIA INCISIONAL LAPAROSCOPIC WITH MESH (N/A Abdomen)    Relevant Problems   GI/HEPATIC   (+) Acute pancreatitis   (+) Bariatric surgery status   (+) Fatty liver      /RENAL   (+) CHARLIE (acute kidney injury) ([facility] [address] )   (+) Hydronephrosis with urinary obstruction due to ureteral calculus      PULMONARY   (+) Asthma        Physical Exam    Airway    Mallampati score: II  TM Distance: >3 FB  Neck ROM: full     Dental       Cardiovascular  Cardiovascular exam normal    Pulmonary  Pulmonary exam normal     Other Findings        Anesthesia Plan  ASA Score- 2     Anesthesia Type- general with ASA Monitors  Additional Monitors:   Airway Plan: ETT  Plan Factors-Exercise tolerance (METS): >4 METS  Chart reviewed  EKG reviewed  Imaging results reviewed  Existing labs reviewed  Patient summary reviewed  Patient is not a current smoker  Patient did not smoke on day of surgery  Obstructive sleep apnea risk education given perioperatively  Induction- intravenous  Postoperative Plan- Plan for postoperative opioid use  Planned trial extubation    Informed Consent- Anesthetic plan and risks discussed with patient  I personally reviewed this patient with the CRNA  Discussed and agreed on the Anesthesia Plan with the CRNA  Fidel Hinojosa

## 2022-11-16 ENCOUNTER — TELEPHONE (OUTPATIENT)
Dept: ANESTHESIOLOGY | Facility: HOSPITAL | Age: 47
End: 2022-11-16

## 2022-11-16 DIAGNOSIS — G89.18 PAIN, POSTOPERATIVE, ACUTE: Primary | ICD-10-CM

## 2022-11-16 RX ORDER — TRAMADOL HYDROCHLORIDE 50 MG/1
50 TABLET ORAL EVERY 8 HOURS PRN
Qty: 20 TABLET | Refills: 0 | Status: SHIPPED | OUTPATIENT
Start: 2022-11-16 | End: 2022-11-26

## 2022-11-16 RX ORDER — NALOXONE HYDROCHLORIDE 4 MG/.1ML
SPRAY NASAL
Qty: 1 EACH | Refills: 0 | Status: SHIPPED | OUTPATIENT
Start: 2022-11-16

## 2022-11-16 NOTE — OP NOTE
Laparoscopic Ventral or Incisional Herniorrhaphy with mesh, Procedure Note    Name: Benjaman Kanner   : 1975  MRN: [id number]  Date: 11/15/2022    Indications: Symptomatic incisional hernia    Pre-operative Diagnosis: Abdominal wall hernia, incisional     Post-operative Diagnosis: Abdominal wall hernia, incisional    Adhesions  Procedure: Laparoscopic incisional hernia with mesh, lysis of adhesions    Surgeon: Luis Fernando Costello MD  Assistants: Ranjan GARCES, no qualified resident available  PA was medically necessary for the surgical safety of the case, including suturing, retraction, hemostasis  Anesthesia: General endotracheal anesthesia      Procedure Details   The patient was seen in the Holding Room  The risks, benefits, complications, treatment options, and expected outcomes were discussed with the patient  The possibilities of reaction to medication, pulmonary aspiration, perforation of viscus, bleeding, recurrent infection, the need for additional procedures, failure to diagnose a condition, and creating a complication requiring transfusion or operation were discussed with the patient  The patient concurred with the proposed plan, giving informed consent  The site of surgery properly noted/marked  The patient was taken to Operating Room, identified as Benjaman Kanner  Staff confirmed patient name, , and procedure  A Time Out was held and the above information confirmed  The patient was placed supine  After the induction of anesthesia, a Weathers and oral gastric tube placed  They were prepped and draped in a sterile fashion  An additional timeout was performed  A right upper quadrant incision was made, dissection carried up to the fascia, and the abdomen entered under direct vision  A pneumoperitoneum created and the camera inserted  There was no intra-abdominal injury or bleeding    Additional trochars were placed in the contralateral upper quadrant and right and left lower quadrants under direct vision with 5 mm trocors  Any adhesions to the intra-abdominal wall were carefully lysed using a combination of blunt dissection, scissor dissection, and a harmonic scalpel or cautery where appropriate  Care was taken not to injure the bowel  The bowel was carefully inspected, and preserved  The hernia sac was excised with harmonic scalpel and removed via 12mm trocar    The fascia was closed, using a suture-passer and 0 Prolene sutures  An appropriate sized mesh was selected to fit the defect with at least 3-5 cm of overlap  The mesh was then rolled, placed into the larger port site, unrolled and reoriented intra-abdominally  This mesh was oriented to the defect and secured with Sorbafix in two concentric rows on the outside portion of the mesh  Prolene sutures were placed in the mesh outside the abdomen using a suture passer at the three, six, and nine o'clock positions  The abdomen was reinspected for hemostasis  There was no evidence of bleeding or bowel injury  The larger port site was closed using an 0 Vicrylon a suture passer and bernard closure device under direct vision  The pneumoperitoneum was released  All skin incisions were closed using 4-0 Monocryl subcuticular sutures  The stab incisions for the mesh sutures were closed using histocryl  All wounds were dressed with histocryl and dressings if needed  Abdominal binder was placed on the abdomen  Instrument, sponge, and needle counts were correct prior to closure and at the conclusion of the case  This text is generated with voice recognition software  There may be translation or grammatical, or syntax errors  If you have any questions, please contact the dictating provider       Findings:  Incisional hernia - multiple defects largest of which was closed with suture passer    Estimated Blood Loss:  Minimal              Specimens: none  * No orders in the log *           Implants: mesh Complications:  None; patient tolerated the procedure well             Disposition: PACU            Condition: stable    Attending Attestation: I was present for the entire procedure    Signature:   Shabbir Flanagan MD  Date: 11/16/2022 Time: 4:15 PM

## 2022-11-16 NOTE — QUICK NOTE
Anesthesiology Brief Note    Zeny Ibanez MRN: [id number]  Unit/Bed#: OR [place] Encounter: [id number]      I spoke with patient on th phone, because she requested on her nursing follow up call  She was complaining of pain and swelling at IV site with some wrist pain  She stated that she had good color to her fingers and no change in sensation this morning and has been improving since last night  I explained that it could be an allergic reaction to the adhesive or phlebitis as a likely cause  She described good capillary refill  I advised her to come to the ED is her blood flow is compromised or she looses feeling in her fingers  I also stated that cold and hot compresses plus elevation would help      She stated understanding and was satisfied    Signature: Rafael Estrada MD Date/Time: November 16, 2022 / 8:48 AM

## 2022-11-21 ENCOUNTER — TELEPHONE (OUTPATIENT)
Dept: SURGERY | Facility: HOSPITAL | Age: 47
End: 2022-11-21

## 2022-11-21 NOTE — TELEPHONE ENCOUNTER
Patient had laparoscopic incisional hernia repair done on 11/15/22 by Dr Yadav Edu  Is concerned as she has mot had a "real bowel movement yet"  Has gone in small amounts  No nausea or vomiting  States she has been taking Colace and Milk of Magnesia  Has not been eating  Explained that she should be eating normally by now  If not eating, there really won't be anything to expel out  Continue to observe and call back with any other concerns

## 2022-11-28 ENCOUNTER — OFFICE VISIT (OUTPATIENT)
Dept: SURGERY | Facility: HOSPITAL | Age: 47
End: 2022-11-28

## 2022-11-28 VITALS — WEIGHT: 237.2 LBS | BODY MASS INDEX: 39.52 KG/M2 | RESPIRATION RATE: 16 BRPM | HEIGHT: 65 IN | TEMPERATURE: 98.6 F

## 2022-11-28 DIAGNOSIS — Z98.890 POST-OPERATIVE STATE: Primary | ICD-10-CM

## 2022-11-28 NOTE — PROGRESS NOTES
Assessment/Plan:   Yady Mena is a 52 y  o female who comes in today for postoperative check after Laparoscopic repair of multiple incisional hernias with mesh, extensive lysis of adhesions on 12/15/22  Patient with long history of incisional hernia following gastric bypass procedure  Patient admitted in October due incarcerated hernia containing portion of transverse colon  Hernia was able to be reduced on this admission and patient's symptoms quickly improved  She was previously been seen by Dr Luciana Mitchell but required weight loss prior to surgical intervention  Patient had lost  Over 70 lbs since her previous visit which she  has been able to maintain and therefore was acceptable for surgical intervention  Patient is doing well today  Complains of some incisional pain  Pulling at right lower quadrant incision  She is tolerating a diet but complain of some decreased appetite  She did have some constipation initially but this has improved  She is using only ibuprofen for pain control as needed  She is wearing abdominal binder  She denies any fevers or chills  She is following lifting activity restrictions  On exam patient's abdomen is benign  Hernia repair is intact  Incision sites are intact with surgical glue in place  Patient is doing well  She may discontinue continuous abdominal binder and use for activities and  pain control as needed  She should continue diet as tolerated and supplement nutrition drinks as needed for adequate healing  She may remove surgical glue from incision sites as it lifts from skin edges  She should continue to refrain from strenuous activity and lift nothing greater than 25 lb until 12/15/2022  At that point she may advance activities as tolerated  She should wait 6 weeks before doing any significantly strenuous activities  She does not require further surgical follow-up but should continue to call with any questions or concerns    She should continue to follow her weight loss regimen  Consider plastic surgery evaluation for abdominal pannus  All questions answered  HPI:  Jenifer Robledo is a 52 y  o female who comes in today for postoperative check after recent incisional hernia as above  Currently patient is doing well  Complains of some incisional pain  Pulling at right lower quadrant incision  She is tolerating a diet but complain of some decreased appetite  She did have some constipation initially but this has improved  She is using only ibuprofen for pain control as needed  She is wearing abdominal binder  She denies any fevers or chills  She is following lifting activity restrictions       ROS:  General ROS: negative for - chills, fatigue, fever or night sweats, weight loss  Respiratory ROS: no cough, shortness of breath, or wheezing  Cardiovascular ROS: no chest pain or dyspnea on exertion  Abdomen ROS: as per HPI  Genito-Urinary ROS: no dysuria, trouble voiding, or hematuria  Musculoskeletal ROS: negative for - gait disturbance, joint pain or muscle pain  Neurological ROS: no TIA or stroke symptoms  Skin ROS: surgical incisions    ALLERGIES  Morphine, Tylenol [acetaminophen], Augmentin [amoxicillin-pot clavulanate], Ciprofloxacin, Doxycycline, Latex, Penicillins, and Tomato - food allergy    Current Outpatient Medications:   •  acetaminophen (TYLENOL) 500 mg tablet, Take 1 tablet (500 mg total) by mouth every 6 (six) hours as needed for mild pain or moderate pain, Disp: 30 tablet, Rfl: 0  •  ALPRAZolam (XANAX) 0 25 mg tablet, Take 0 25 mg by mouth 3 (three) times a day as needed, Disp: , Rfl:   •  BIOTIN PO, Take 1 capsule by mouth daily, Disp: , Rfl:   •  MINOXIDIL FOR WOMEN EX, Apply 1 application topically see administration instructions Weekly on sunday, Disp: , Rfl:   •  Multiple Vitamin (multivitamin) tablet, Take 1 tablet by mouth daily, Disp: , Rfl:   •  naloxone (NARCAN) 4 mg/0 1 mL nasal spray, Administer 1 spray into a nostril   If no response after 2-3 minutes, give another dose in the other nostril using a new spray , Disp: 1 each, Rfl: 0  •  ondansetron (Zofran ODT) 4 mg disintegrating tablet, Take 1 tablet (4 mg total) by mouth every 6 (six) hours as needed for nausea or vomiting, Disp: 8 tablet, Rfl: 0  •  Ascorbic Acid (VITAMIN C PO), Take 1 capsule by mouth daily, Disp: , Rfl:   Past Medical History:   Diagnosis Date   • Alcohol abuse 01/30/2019    Sober per pt x4 years   • Anemia    • Anesthesia complication     Hx of Aggitated while waking up from Anesthesia   • Anxiety    • Asthma     sports induced has not used inhaler in 10 years or so   • Bariatric surgery status     roun y gastric bypass--- in 2002 or so   • Blood in urine    • BP (high blood pressure)     not diagnosed but recently with kidney stone   • Burning with urination    • Depression    • Diarrhea due to malabsorption 02/21/2019   • Fatty liver 03/21/2019   • Hiatal hernia    • Kidney stone    • Left ACL tear     MCL and PCL-has had for 15 yrs or so   • Left flank pain    • Morbid obesity ([facility] [address] ) 09/27/2018   • Pancreatitis     per pt had it in the past   • Personal history of COVID-19     approx 2 months ago-- recovered at home   • Spinal headache    • Tooth disorder     "one tooth hole in it"   • Wears glasses      Past Surgical History:   Procedure Laterality Date   • ABDOMINAL ADHESION SURGERY N/A 11/15/2022    Procedure: EXTENSIVE LYSIS ADHESIONS;  Surgeon: Briana Durant MD;  Location:  MAIN OR;  Service: General   • CHOLECYSTECTOMY LAPAROSCOPIC N/A 02/06/2019    Procedure: CHOLECYSTECTOMY LAPAROSCOPIC with ioc;  Surgeon: Briana Durant MD;  Location:  MAIN OR;  Service: General   • DILATION AND CURETTAGE OF UTERUS     • FL RETROGRADE PYELOGRAM  08/09/2022   • FL RETROGRADE PYELOGRAM  9/9/2022   • GASTRIC BYPASS     • NE CYSTO/URETERO W/LITHOTRIPSY &INDWELL STENT INSRT Left 9/9/2022    Procedure: CYSTO USCOPE W/ LASER, RETROGRADE AND STENT exchange, basket stone extraction;  Surgeon: Ronal Alvarado MD;  [facility];  Service: Urology   • IL CYSTOURETHROSCOPY,URETER CATHETER Left 08/09/2022    Procedure: Ariel Cruz PYELOGRAM WITH INSERTION STENT URETERAL;  Surgeon: Ronal Alvarado MD;  Location: [facility];  Service: Urology   • IL LAP, INCISIONAL HERNIA REPAIR,REDUCIBLE N/A 11/15/2022    Procedure: REPAIR HERNIA INCISIONAL LAPAROSCOPIC WITH MESH;  Surgeon: Kelli Bhakta MD;  [facility];  Service: General   • WISDOM TOOTH EXTRACTION       Family History   Problem Relation Age of Onset   • Alcohol abuse Maternal Grandfather    • Colon polyps Neg Hx    • Colon cancer Neg Hx       reports that she has never smoked  She has never used smokeless tobacco  She reports that she does not currently use alcohol after a past usage of about 5 0 standard drinks per week  She reports current drug use  Drug: Marijuana  PHYSICAL EXAM    Vitals:    11/28/22 1418   Resp: 16   Temp: 98 6 °F (37 °C)     Weight (last 2 days)     Date/Time Weight    11/28/22 1418 108 (237 2)          General: normal, cooperative, no distress  Abdominal: soft, non distended, mild tenderness over right lower quadrant port site, no palpable defect  Hernia repair intact  Incision: clean, dry, and intact and healing well  Surgical glue in place       Kyle Bassett

## 2023-02-13 ENCOUNTER — TELEPHONE (OUTPATIENT)
Dept: GASTROENTEROLOGY | Facility: CLINIC | Age: 48
End: 2023-02-13

## 2023-02-13 ENCOUNTER — OFFICE VISIT (OUTPATIENT)
Dept: GASTROENTEROLOGY | Facility: CLINIC | Age: 48
End: 2023-02-13

## 2023-02-13 VITALS
BODY MASS INDEX: 37.65 KG/M2 | HEIGHT: 65 IN | SYSTOLIC BLOOD PRESSURE: 140 MMHG | DIASTOLIC BLOOD PRESSURE: 80 MMHG | WEIGHT: 226 LBS

## 2023-02-13 DIAGNOSIS — Z98.84 HISTORY OF ROUX-EN-Y GASTRIC BYPASS: ICD-10-CM

## 2023-02-13 DIAGNOSIS — R10.84 GENERALIZED ABDOMINAL PAIN: ICD-10-CM

## 2023-02-13 DIAGNOSIS — R68.81 EARLY SATIETY: ICD-10-CM

## 2023-02-13 DIAGNOSIS — R10.84 GENERALIZED ABDOMINAL PAIN: Primary | ICD-10-CM

## 2023-02-13 DIAGNOSIS — E66.09 CLASS 2 OBESITY DUE TO EXCESS CALORIES WITHOUT SERIOUS COMORBIDITY WITH BODY MASS INDEX (BMI) OF 37.0 TO 37.9 IN ADULT: ICD-10-CM

## 2023-02-13 DIAGNOSIS — Z12.11 COLON CANCER SCREENING: ICD-10-CM

## 2023-02-13 NOTE — PROGRESS NOTES
[id number] - Outpatient Consultation  Cindy Kincaid 52 y o  female MRN: [id number]  Encounter: [id number]    ASSESSMENT AND PLAN:      1  Generalized abdominal pain  47F h/o obesity s/p carolina en y, h/o ETOH pancreatitis (quit ETOH 2019) here today at the request of Dr Joe Ortiz MD for abd pain and early satiety since her ventral hernia repair w mesh in 11/2022  Sounds like there is a restrictive component once the mesh went in, shailesh given pt's inability to eat much more than a small handful of food at once  Regardless, will check EGD/COLON to assure no PUD, anastomotic ulcer, and biopsy  - Colonoscopy; Future  - EGD; Future  - sodium picosulfate, magnesium oxide, citric acid 10-3 5-12 MG-GM -GM/160ML SOLN; Take 160 mL by mouth once for 1 dose Take 160 mL by mouth once for 1 dose  Dispense: 160 mL; Refill: 0    2  Early satiety    - Colonoscopy; Future  - EGD; Future  - sodium picosulfate, magnesium oxide, citric acid 10-3 5-12 MG-GM -GM/160ML SOLN; Take 160 mL by mouth once for 1 dose Take 160 mL by mouth once for 1 dose  Dispense: 160 mL; Refill: 0    3  History of Carolina-en-Y gastric bypass    - Colonoscopy; Future  - EGD; Future  - sodium picosulfate, magnesium oxide, citric acid 10-3 5-12 MG-GM -GM/160ML SOLN; Take 160 mL by mouth once for 1 dose Take 160 mL by mouth once for 1 dose  Dispense: 160 mL; Refill: 0    4  Colon cancer screening  Average risk, never had a colonoscopy    5  Class 2 obesity due to excess calories without serious comorbidity with body mass index (BMI) of 37 0 to 37 9 in adult  Lost about 75lbs in the past year alone      Followup Appointment: 3 months  ______________________________________________________________________    Chief Complaint   Patient presents with   • Hernia surgery in November, loss of appetite     Had trouble going to bathroom after surgery, right side pain not sure if it is from hernia or GI related   Has diarrhea and no appetite now       HPI:   Marguerite Cruz is a 52y o  year old female who presents today at the request of Dr Betty Carreno for abdominal pain and early satiety  Patient with history of Adilson-en-Y gastric bypass surgery for obesity  Recently was hospitalized for partial Obstruction from ventral hernia with bowel incarceration  Initially was manually reduced and was instructed to lose about 50 pounds before the hernia repair  Patient has lost over 75 pounds at this point  Did get her mesh placed in November 2022  Since then, she feels that she cannot eat very much without getting abdominal pain  She describes it as generalized, mainly in the upper abdomen but can radiate all the way into the mid near the umbilicus  Worse with eating  Constant  Sharp and crampy  A lot of early satiety  No significant nausea or vomiting  Bowels are okay, but has baseline loose stools ever since her bypass surgery and cholecystectomy      Historical Information   Past Medical History:   Diagnosis Date   • Alcohol abuse 01/30/2019    Sober per pt x4 years   • Anemia    • Anesthesia complication     Hx of Aggitated while waking up from Anesthesia   • Anxiety    • Asthma     sports induced has not used inhaler in 10 years or so   • Bariatric surgery status     roun y gastric bypass--- in 2002 or so   • Blood in urine    • BP (high blood pressure)     not diagnosed but recently with kidney stone   • Burning with urination    • Depression    • Diarrhea due to malabsorption 02/21/2019   • Fatty liver 03/21/2019   • Hiatal hernia    • Kidney stone    • Left ACL tear     MCL and PCL-has had for 15 yrs or so   • Left flank pain    • Morbid obesity ([facility] [address] ) 09/27/2018   • Pancreatitis     per pt had it in the past   • Personal history of COVID-19     approx 2 months ago-- recovered at home   • Spinal headache    • Tooth disorder     "one tooth hole in it"   • Wears glasses      Past Surgical History:   Procedure Laterality Date   • ABDOMINAL ADHESION SURGERY N/A 11/15/2022    Procedure: EXTENSIVE LYSIS ADHESIONS;  Surgeon: Andrzej Heredia MD;  [facility];  Service: General   • CHOLECYSTECTOMY LAPAROSCOPIC N/A 02/06/2019    Procedure: CHOLECYSTECTOMY LAPAROSCOPIC with ioc;  Surgeon: Andrzej Heredia MD;  Location: [facility];  Service: General   • DILATION AND CURETTAGE OF UTERUS     • FL RETROGRADE PYELOGRAM  08/09/2022   • FL RETROGRADE PYELOGRAM  9/9/2022   • GASTRIC BYPASS     • SC CYSTO BLADDER W/URETERAL CATHETERIZATION Left 08/09/2022    Procedure: CYSTOSCOPY RETROGRADE PYELOGRAM WITH INSERTION STENT URETERAL;  Surgeon: Emily Lamb MD;  [facility];  Service: Urology   • SC CYSTO/URETERO W/LITHOTRIPSY &INDWELL STENT INSRT Left 9/9/2022    Procedure: CYSTO USCOPE W/ LASER, RETROGRADE AND STENT exchange, basket stone extraction;  Surgeon: Emily Lamb MD;  [facility];  Service: Urology   • SC LAP RPR HRNA XCPT INCAL/INGUN NCRC8/STRANGULATED N/A 11/15/2022    Procedure: REPAIR HERNIA INCISIONAL LAPAROSCOPIC WITH MESH;  Surgeon: Andrzej Heredia MD;  [facility];  Service: General   • WISDOM TOOTH EXTRACTION       Social History     Substance and Sexual Activity   Alcohol Use Not Currently   • Alcohol/week: 5 0 standard drinks   • Types: 5 Shots of liquor per week    Comment: past hx of alcohol abuse  "sober for 4 years"     Social History     Substance and Sexual Activity   Drug Use Yes   • Types: Marijuana    Comment: Socially     Social History     Tobacco Use   Smoking Status Never   Smokeless Tobacco Never     Family History   Problem Relation Age of Onset   • Alcohol abuse Maternal Grandfather    • Colon polyps Neg Hx    • Colon cancer Neg Hx        Meds/Allergies     Current Outpatient Medications:   •  BIOTIN PO  •  MINOXIDIL FOR WOMEN EX  •  Multiple Vitamin (multivitamin) tablet  •  sodium picosulfate, magnesium oxide, citric acid 10-3 5-12 MG-GM -GM/160ML SOLN    Allergies Allergen Reactions   • Morphine Hallucinations     "felt like ants crawling all over"   • Tylenol [Acetaminophen] Other (See Comments)     nosebleeds   • Augmentin [Amoxicillin-Pot Clavulanate] Rash   • Ciprofloxacin Rash   • Doxycycline Rash   • Latex Rash     Generalized Rash   • Penicillins Rash     "All Cillins"   • Tomato - Food Allergy Other (See Comments)     Tongue irritation       PHYSICAL EXAM:    Blood pressure 140/80, height 5' 5" (1 651 m), weight 103 kg (226 lb)  Body mass index is 37 61 kg/m²  General Appearance: NAD, cooperative, alert  Eyes: Anicteric, PERRLA, EOMI  ENT:  Normocephalic, atraumatic, normal mucosa  Neck:  Supple, symmetrical, trachea midline,   Resp:  Clear to auscultation bilaterally; no rales, rhonchi or wheezing; respirations unlabored   CV:  S1 S2, Regular rate and rhythm; no murmur, rub, or gallop  GI:  Soft, non-tender, non-distended; normal bowel sounds; no masses, no organomegaly   Rectal: Deferred  Musculoskeletal: No cyanosis, clubbing or edema  Normal ROM  Skin:  No jaundice, rashes, or lesions   Heme/Lymph: No palpable cervical lymphadenopathy  Psych: Normal affect, good eye contact  Neuro: No gross deficits, AAOx3    Lab Results:   Lab Results   Component Value Date    WBC 3 23 (L) 10/31/2022    HGB 13 0 10/31/2022    HCT 38 1 10/31/2022    MCV 94 10/31/2022     10/31/2022     Lab Results   Component Value Date    K 3 4 (L) 10/31/2022     10/31/2022    CO2 26 10/31/2022    BUN 9 10/31/2022    CREATININE 0 81 10/31/2022    GLUF 134 (H) 10/31/2022    CALCIUM 7 9 (L) 10/31/2022    AST 10 10/30/2022    ALT 14 10/30/2022    ALKPHOS 72 10/30/2022    EGFR 86 10/31/2022     No results found for: IRON, TIBC, FERRITIN  Lab Results   Component Value Date    LIPASE 138 10/30/2022       Radiology Results:   No results found  REVIEW OF SYSTEMS:    CONSTITUTIONAL: Denies any fever, chills, rigors, and weight loss  HEENT: No earache or tinnitus   Denies hearing loss or visual disturbances  CARDIOVASCULAR: No chest pain or palpitations  RESPIRATORY: Denies any cough, hemoptysis, shortness of breath or dyspnea on exertion  GASTROINTESTINAL: As noted in the History of Present Illness  GENITOURINARY: No problems with urination  Denies any hematuria or dysuria  NEUROLOGIC: No dizziness or vertigo, denies headaches  MUSCULOSKELETAL: Denies any muscle or joint pain  SKIN: Denies skin rashes or itching  ENDOCRINE: Denies excessive thirst  Denies intolerance to heat or cold  PSYCHOSOCIAL: Denies depression or anxiety  Denies any recent memory loss

## 2023-02-13 NOTE — TELEPHONE ENCOUNTER
Scheduled date of colonoscopy (as of today):3/10/23  Physician performing colonoscopy:FAITH  Location of colonoscopy:[facility]  Bowel prep reviewed with patient:Clenpiq  Instructions reviewed with patient by:FIFI  Clearances: N

## 2023-02-14 NOTE — PLAN OF CARE
SAFETY,RESTRAINT: NV/NON-SELF DESTRUCTIVE BEHAVIOR     Remains free of harm/injury (restraint for non violent/non self-detsructive behavior) Completed     Returns to optimal restraint-free functioning Completed          DISCHARGE PLANNING     Discharge to home or other facility with appropriate resources Progressing        DISCHARGE PLANNING - CARE MANAGEMENT     Discharge to post-acute care or home with appropriate resources Progressing        GENITOURINARY - ADULT     Absence of urinary retention Progressing        INFECTION - ADULT     Absence or prevention of progression during hospitalization Progressing     Absence of fever/infection during neutropenic period Progressing        Knowledge Deficit     Patient/family/caregiver demonstrates understanding of disease process, treatment plan, medications, and discharge instructions Progressing        METABOLIC, FLUID AND ELECTROLYTES - ADULT     Electrolytes maintained within normal limits Progressing     Fluid balance maintained Progressing        NEUROSENSORY - ADULT     Achieves stable or improved neurological status Progressing     Absence of seizures Progressing        Nutrition/Hydration-ADULT     Nutrient/Hydration intake appropriate for improving, restoring or maintaining nutritional needs Progressing        PAIN - ADULT     Verbalizes/displays adequate comfort level or baseline comfort level Progressing        Potential for Falls     Patient will remain free of falls Progressing        Prexisting or High Potential for Compromised Skin Integrity     Skin integrity is maintained or improved Progressing        RESPIRATORY - ADULT     Achieves optimal ventilation and oxygenation Progressing        SAFETY ADULT     Patient will remain free of falls Progressing     Maintain or return to baseline ADL function Progressing     Maintain or return mobility status to optimal level Progressing [Time Spent: ___ minutes] : I have spent [unfilled] minutes of time on the encounter.

## 2023-02-15 RX ORDER — PEG-3350, SODIUM SULFATE, SODIUM CHLORIDE, POTASSIUM CHLORIDE, SODIUM ASCORBATE AND ASCORBIC ACID 7.5-2.691G
KIT ORAL
Refills: 0 | OUTPATIENT
Start: 2023-02-15

## 2023-02-23 ENCOUNTER — TELEPHONE (OUTPATIENT)
Dept: GASTROENTEROLOGY | Facility: CLINIC | Age: 48
End: 2023-02-23

## 2023-02-23 DIAGNOSIS — D64.9 ANEMIA, UNSPECIFIED TYPE: Primary | ICD-10-CM

## 2023-03-03 ENCOUNTER — TELEPHONE (OUTPATIENT)
Dept: GASTROENTEROLOGY | Facility: CLINIC | Age: 48
End: 2023-03-03

## 2023-03-03 LAB
BASOPHILS # BLD AUTO: 0 X10E3/UL (ref 0–0.2)
BASOPHILS NFR BLD AUTO: 1 %
EOSINOPHIL # BLD AUTO: 0.1 X10E3/UL (ref 0–0.4)
EOSINOPHIL NFR BLD AUTO: 2 %
ERYTHROCYTE [DISTWIDTH] IN BLOOD BY AUTOMATED COUNT: 12 % (ref 11.7–15.4)
HCT VFR BLD AUTO: 41.9 % (ref 34–46.6)
HGB BLD-MCNC: 13.9 G/DL (ref 11.1–15.9)
IMM GRANULOCYTES # BLD: 0 X10E3/UL (ref 0–0.1)
IMM GRANULOCYTES NFR BLD: 0 %
LYMPHOCYTES # BLD AUTO: 1.8 X10E3/UL (ref 0.7–3.1)
LYMPHOCYTES NFR BLD AUTO: 29 %
MCH RBC QN AUTO: 31.7 PG (ref 26.6–33)
MCHC RBC AUTO-ENTMCNC: 33.2 G/DL (ref 31.5–35.7)
MCV RBC AUTO: 96 FL (ref 79–97)
MONOCYTES # BLD AUTO: 0.3 X10E3/UL (ref 0.1–0.9)
MONOCYTES NFR BLD AUTO: 5 %
NEUTROPHILS # BLD AUTO: 4 X10E3/UL (ref 1.4–7)
NEUTROPHILS NFR BLD AUTO: 63 %
PLATELET # BLD AUTO: 188 X10E3/UL (ref 150–450)
RBC # BLD AUTO: 4.38 X10E6/UL (ref 3.77–5.28)
WBC # BLD AUTO: 6.3 X10E3/UL (ref 3.4–10.8)

## 2023-03-03 NOTE — TELEPHONE ENCOUNTER
Confirmed patient arrival time for combo on 3/10/2023  Pt completed CBC and Hemoglobin was 13 9 on 3/2/2023

## 2023-03-10 ENCOUNTER — ANESTHESIA EVENT (OUTPATIENT)
Dept: GASTROENTEROLOGY | Facility: AMBULATORY SURGERY CENTER | Age: 48
End: 2023-03-10

## 2023-03-10 ENCOUNTER — ANESTHESIA (OUTPATIENT)
Dept: GASTROENTEROLOGY | Facility: AMBULATORY SURGERY CENTER | Age: 48
End: 2023-03-10

## 2023-03-10 ENCOUNTER — HOSPITAL ENCOUNTER (OUTPATIENT)
Dept: GASTROENTEROLOGY | Facility: AMBULATORY SURGERY CENTER | Age: 48
Discharge: HOME | End: 2023-03-10
Attending: INTERNAL MEDICINE

## 2023-03-10 VITALS
BODY MASS INDEX: 37.65 KG/M2 | DIASTOLIC BLOOD PRESSURE: 58 MMHG | WEIGHT: 226 LBS | SYSTOLIC BLOOD PRESSURE: 119 MMHG | OXYGEN SATURATION: 98 % | RESPIRATION RATE: 24 BRPM | HEIGHT: 65 IN | HEART RATE: 67 BPM | TEMPERATURE: 98 F

## 2023-03-10 DIAGNOSIS — R68.81 EARLY SATIETY: ICD-10-CM

## 2023-03-10 DIAGNOSIS — Z98.84 HISTORY OF ROUX-EN-Y GASTRIC BYPASS: ICD-10-CM

## 2023-03-10 DIAGNOSIS — R10.84 GENERALIZED ABDOMINAL PAIN: ICD-10-CM

## 2023-03-10 LAB
EXT PREGNANCY TEST URINE: NEGATIVE
EXT. CONTROL: NORMAL

## 2023-03-10 RX ORDER — PROPOFOL 10 MG/ML
INJECTION, EMULSION INTRAVENOUS AS NEEDED
Status: DISCONTINUED | OUTPATIENT
Start: 2023-03-10 | End: 2023-03-10

## 2023-03-10 RX ORDER — PROPOFOL 10 MG/ML
INJECTION, EMULSION INTRAVENOUS CONTINUOUS PRN
Status: DISCONTINUED | OUTPATIENT
Start: 2023-03-10 | End: 2023-03-10

## 2023-03-10 RX ORDER — SODIUM CHLORIDE, SODIUM LACTATE, POTASSIUM CHLORIDE, CALCIUM CHLORIDE 600; 310; 30; 20 MG/100ML; MG/100ML; MG/100ML; MG/100ML
50 INJECTION, SOLUTION INTRAVENOUS CONTINUOUS
Status: DISCONTINUED | OUTPATIENT
Start: 2023-03-10 | End: 2023-03-14 | Stop reason: HOSPADM

## 2023-03-10 RX ADMIN — PROPOFOL 150 MG: 10 INJECTION, EMULSION INTRAVENOUS at 15:16

## 2023-03-10 RX ADMIN — SODIUM CHLORIDE, SODIUM LACTATE, POTASSIUM CHLORIDE, CALCIUM CHLORIDE 50 ML/HR: 600; 310; 30; 20 INJECTION, SOLUTION INTRAVENOUS at 14:55

## 2023-03-10 RX ADMIN — PROPOFOL 100 MCG/KG/MIN: 10 INJECTION, EMULSION INTRAVENOUS at 15:16

## 2023-03-10 NOTE — ANESTHESIA PREPROCEDURE EVALUATION
Procedure:  COLONOSCOPY  EGD    Relevant Problems   GI/HEPATIC   (+) Acute pancreatitis   (+) Bariatric surgery status   (+) Fatty liver      /RENAL   (+) CHARLIE (acute kidney injury) ([facility] [address] )   (+) Hydronephrosis with urinary obstruction due to ureteral calculus      PULMONARY   (+) Asthma      Digestive   (+) Diarrhea due to malabsorption      Other   (+) Alcohol abuse   (+) Morbid obesity (HCC)        Physical Exam    Airway    Mallampati score: I  TM Distance: >3 FB  Neck ROM: full     Dental       Cardiovascular  Cardiovascular exam normal    Pulmonary  Pulmonary exam normal     Other Findings        Anesthesia Plan  ASA Score- 3     Anesthesia Type- IV sedation with anesthesia with ASA Monitors  Additional Monitors:   Airway Plan:           Plan Factors-Exercise tolerance (METS): >4 METS  Chart reviewed  EKG reviewed  Imaging results reviewed  Existing labs reviewed  Patient summary reviewed  Induction- intravenous  Postoperative Plan- Plan for postoperative opioid use  Planned trial extubation    Informed Consent- Anesthetic plan and risks discussed with patient  I personally reviewed this patient with the CRNA  Discussed and agreed on the Anesthesia Plan with the CRNA  Frutoso Spatz

## 2023-03-10 NOTE — H&P
History and Physical - [id number]    Grecia Uriarte 52 y o  female MRN: [id number]      HPI: Grecia Uriarte is a 52y o  year old female who presents for abdominal pain, early satiety, history of gastric bypass  Allergies   Allergen Reactions   • Morphine Hallucinations     "felt like ants crawling all over"   • Tylenol [Acetaminophen] Other (See Comments)     nosebleeds   • Augmentin [Amoxicillin-Pot Clavulanate] Rash   • Ciprofloxacin Rash   • Doxycycline Rash   • Latex Rash     Generalized Rash   • Penicillins Rash     "All Cillins"   • Tomato - Food Allergy Other (See Comments)     Tongue irritation         REVIEW OF SYSTEMS: Per the HPI, and otherwise unremarkable      Historical Information     Past Medical History:   Diagnosis Date   • Alcohol abuse 01/30/2019    Sober per pt x4 years   • Anemia    • Anesthesia complication     Hx of Aggitated while waking up from Anesthesia   • Anxiety    • Asthma     sports induced has not used inhaler in 10 years or so   • Bariatric surgery status     roun y gastric bypass--- in 2002 or so   • Blood in urine    • BP (high blood pressure)     not diagnosed but recently with kidney stone   • Burning with urination    • Depression    • Diarrhea due to malabsorption 02/21/2019   • Fatty liver 03/21/2019   • Hiatal hernia    • Kidney stone    • Left ACL tear     MCL and PCL-has had for 15 yrs or so   • Left flank pain    • Morbid obesity ([facility] [address] ) 09/27/2018   • Pancreatitis     per pt had it in the past   • Personal history of COVID-19     approx 2 months ago-- recovered at home   • Spinal headache    • Tooth disorder     "one tooth hole in it"   • Wears glasses      Past Surgical History:   Procedure Laterality Date   • ABDOMINAL ADHESION SURGERY N/A 11/15/2022    Procedure: EXTENSIVE LYSIS ADHESIONS;  Surgeon: Ariel Ang MD;  Location:  MAIN OR;  Service: General   • CHOLECYSTECTOMY LAPAROSCOPIC N/A 02/06/2019    Procedure: CHOLECYSTECTOMY LAPAROSCOPIC with ioc;  Surgeon: Blayne Desouza MD;  [facility];  Service: General   • DILATION AND CURETTAGE OF UTERUS     • FL RETROGRADE PYELOGRAM  08/09/2022   • FL RETROGRADE PYELOGRAM  09/09/2022   • GASTRIC BYPASS     • HERNIA REPAIR     • MI CYSTO BLADDER W/URETERAL CATHETERIZATION Left 08/09/2022    Procedure: CYSTOSCOPY RETROGRADE PYELOGRAM WITH INSERTION STENT URETERAL;  Surgeon: Maury Nick MD;  [facility];  Service: Urology   • MI CYSTO/URETERO W/LITHOTRIPSY &INDWELL STENT INSRT Left 09/09/2022    Procedure: CYSTO USCOPE W/ LASER, RETROGRADE AND STENT exchange, basket stone extraction;  Surgeon: Maury Nick MD;  [facility];  Service: Urology   • MI LAP RPR HRNA XCPT INCAL/INGUN NCRC8/STRANGULATED N/A 11/15/2022    Procedure: REPAIR HERNIA INCISIONAL LAPAROSCOPIC WITH MESH;  Surgeon: Blayne Desouza MD;  [facility];  Service: General   • WISDOM TOOTH EXTRACTION       Social History   Social History     Substance and Sexual Activity   Alcohol Use Not Currently   • Alcohol/week: 5 0 standard drinks   • Types: 5 Shots of liquor per week    Comment: past hx of alcohol abuse  "sober for 4 years"     Social History     Substance and Sexual Activity   Drug Use Yes   • Types: Marijuana    Comment: Socially     Social History     Tobacco Use   Smoking Status Never   Smokeless Tobacco Never     Family History   Problem Relation Age of Onset   • Alcohol abuse Maternal Grandfather    • Colon polyps Neg Hx    • Colon cancer Neg Hx        Meds/Allergies       Current Outpatient Medications:   •  MINOXIDIL FOR WOMEN EX  •  Multiple Vitamin (multivitamin) tablet  •  BIOTIN PO    Current Facility-Administered Medications:   •  lactated ringers infusion, 50 mL/hr, Intravenous, Continuous, 50 mL/hr at 03/10/23 1455        Objective     /68   Pulse 98   Temp 98 °F (36 7 °C) (Temporal)   Resp 12   Ht 5' 5" (1 651 m)   Wt 103 kg (226 lb)   LMP 03/04/2023   SpO2 99%   BMI 37 61 kg/m²       PHYSICAL EXAM    Gen: NAD AAOx3  Head: Normocephalic, Atraumatic  CV: S1S2 RRR no m/r/g  CHEST: Clear b/l no c/r/w  ABD: soft, +BS NT/ND no masses  EXT: no edema      ASSESSMENT/PLAN:  This is a 52y o  year old female here for EGD and colonoscopy, and she is stable and optimized for her procedure

## 2024-02-21 PROBLEM — Z12.11 COLON CANCER SCREENING: Status: RESOLVED | Noted: 2019-02-21 | Resolved: 2024-02-21

## 2024-04-10 ENCOUNTER — HOSPITAL ENCOUNTER (OUTPATIENT)
Dept: HOSPITAL 99 - HWRAD | Age: 49
End: 2024-04-10
Payer: COMMERCIAL

## 2024-04-10 DIAGNOSIS — D17.9: Primary | ICD-10-CM

## 2024-06-26 ENCOUNTER — TELEPHONE (OUTPATIENT)
Age: 49
End: 2024-06-26

## 2024-06-26 NOTE — TELEPHONE ENCOUNTER
[facility]  Please schedule a virtual visit or face-to-face preconception visit with [facility] physician.  Thanks.  Alba

## 2024-07-19 ENCOUNTER — TELEPHONE (OUTPATIENT)
Age: 49
End: 2024-07-19

## 2024-07-23 NOTE — TELEPHONE ENCOUNTER
Patient returned call, verified & updated contact ph# [phone]. Pt advised they are not interested in Preconception Counseling services at this time. Closed referral as patient coordinating

## (undated) DEVICE — ENDOPOUCH RETRIEVER SPECIMEN RETRIEVAL BAGS: Brand: ENDOPOUCH RETRIEVER

## (undated) DEVICE — CHLORAPREP HI-LITE 26ML ORANGE

## (undated) DEVICE — PDS II VLT 0 107CM AG ST3: Brand: ENDOLOOP

## (undated) DEVICE — UROCATCH BAG

## (undated) DEVICE — TAUT CATH INTRODUCER 4.5 FR

## (undated) DEVICE — BLUE HEAT SCOPE WARMER

## (undated) DEVICE — IRRIG ENDO FLO TUBING

## (undated) DEVICE — PREMIUM DRY TRAY LF: Brand: MEDLINE INDUSTRIES, INC.

## (undated) DEVICE — DRAPE C-ARM X-RAY

## (undated) DEVICE — DRAPE EQUIPMENT RF WAND

## (undated) DEVICE — CHLORHEXIDINE 4PCT 4 OZ

## (undated) DEVICE — ADHESIVE SKIN HIGH VISCOSITY EXOFIN 1ML

## (undated) DEVICE — INTENDED FOR TISSUE SEPARATION, AND OTHER PROCEDURES THAT REQUIRE A SHARP SURGICAL BLADE TO PUNCTURE OR CUT.: Brand: BARD-PARKER SAFETY BLADES SIZE 11, STERILE

## (undated) DEVICE — GLOVE INDICATOR PI UNDERGLOVE SZ 7 BLUE

## (undated) DEVICE — INVIEW CLEAR LEGGINGS: Brand: CONVERTORS

## (undated) DEVICE — GLOVE INDICATOR PI UNDERGLOVE SZ 6.5 BLUE

## (undated) DEVICE — SUT VICRYL 0 UR-6 27 IN J603H

## (undated) DEVICE — SUT MONOCRYL 4-0 PS-2 27 IN Y426H

## (undated) DEVICE — ENDOPATH 5MM CURVED SCISSORS WITH MONOPOLAR CAUTERY: Brand: ENDOPATH

## (undated) DEVICE — ALLENTOWN LAP CHOLE APP PACK: Brand: CARDINAL HEALTH

## (undated) DEVICE — CATH URETERAL 5FR X 70 CM FLEX TIP POLYUR BARD

## (undated) DEVICE — SPONGE 4 X 4 XRAY 16 PLY STRL LF RFD

## (undated) DEVICE — TROCAR: Brand: KII FIOS FIRST ENTRY

## (undated) DEVICE — ELECTRODE BLADE MOD E-Z CLEAN 2.5IN 6.4CM -0012M

## (undated) DEVICE — 2000CC GUARDIAN II: Brand: GUARDIAN

## (undated) DEVICE — ENDOPATH XCEL BLUNT TIP TROCARS WITH SMOOTH SLEEVES: Brand: ENDOPATH XCEL

## (undated) DEVICE — TUBING SMOKE EVAC W/FILTRATION DEVICE PLUMEPORT ACTIV

## (undated) DEVICE — PACK TUR

## (undated) DEVICE — LIGHT HANDLE COVER SLEEVE DISP BLUE STELLAR

## (undated) DEVICE — ENDOPATH XCEL BLADELESS TROCARS WITH STABILITY SLEEVES: Brand: ENDOPATH XCEL

## (undated) DEVICE — TOWEL SURG XR DETECT GREEN STRL RFD

## (undated) DEVICE — TROCAR: Brand: KII SLEEVE

## (undated) DEVICE — LIGAMAX 5 MM ENDOSCOPIC MULTIPLE CLIP APPLIER: Brand: LIGAMAX

## (undated) DEVICE — PAD GROUNDING ADULT

## (undated) DEVICE — FIBER STD QUANTA 272 MICRON

## (undated) DEVICE — ELECTRODE LAP L WIRE E-Z CLEAN 33CM -0100

## (undated) DEVICE — SUT ETHIBOND 0 SH/SH 36 IN X524H

## (undated) DEVICE — SPECIMEN CONTAINER STERILE PEEL PACK

## (undated) DEVICE — PENCIL ELECTROSURG E-Z CLEAN -0035H

## (undated) DEVICE — ELECTRODE BLADE MOD E-Z CLEAN  2.75IN 7CM -0012AM

## (undated) DEVICE — ADHESIVE SKN CLSR HISTOACRYL FLEX 0.5ML LF

## (undated) DEVICE — SUT PROLENE 0 CT-1 30 IN 8424H

## (undated) DEVICE — BASKET SPECIMEN RETRIVAL 1.9FR 120CM

## (undated) DEVICE — MAYO STAND COVER: Brand: CONVERTORS

## (undated) DEVICE — TROCAR: Brand: KII® SLEEVE

## (undated) DEVICE — SCD SEQUENTIAL COMPRESSION COMFORT SLEEVE MEDIUM KNEE LENGTH: Brand: KENDALL SCD

## (undated) DEVICE — GLOVE PI ULTRA TOUCH SZ.8.0

## (undated) DEVICE — ENDOSCOPIC VALVE WITH ADAPTER.: Brand: SURSEAL® II

## (undated) DEVICE — IV CATH 14 G SAFETY

## (undated) DEVICE — HARMONIC ACE 5MM DIAMETER SHEARS 36CM SHAFT LENGTH + ADAPTIVE TISSUE TECHNOLOGY FOR USE WITH GENERATOR G11: Brand: HARMONIC ACE

## (undated) DEVICE — ENDOPATH XCEL UNIVERSAL TROCAR STABLILITY SLEEVES: Brand: ENDOPATH XCEL

## (undated) DEVICE — GLOVE INDICATOR PI UNDERGLOVE SZ 8 BLUE

## (undated) DEVICE — SYRINGE 30ML LL

## (undated) DEVICE — GUIDEWIRE STRGHT TIP 0.035 IN  SOLO PLUS

## (undated) DEVICE — COTTON TIP APPLICTOR 2 PK

## (undated) DEVICE — TUBING SUCTION 5MM X 12 FT

## (undated) DEVICE — URETEROSCOPE DIGITAL FLEXIBLE RVS DEFLECTION SNGL USE WISCOPE

## (undated) DEVICE — STOPCOCK 4-WAY

## (undated) DEVICE — Device

## (undated) DEVICE — ABSORBABLE WOUND CLOSURE DEVICE: Brand: V-LOC 90

## (undated) DEVICE — NEEDLE HYPO 22G X 1-1/2 IN

## (undated) DEVICE — ASTOUND STANDARD SURGICAL GOWN, XXL: Brand: CONVERTORS

## (undated) DEVICE — STERILE CYSTO PACK: Brand: CARDINAL HEALTH

## (undated) DEVICE — INSUFFLATION TUBING PRIMFLO

## (undated) DEVICE — GLOVE PI ULTRA TOUCH SZ.6.5

## (undated) DEVICE — NEEDLE SPINAL18G X 3.5 IN QUINCKE

## (undated) DEVICE — CATH URET .038 10FR 50CM DUAL LUMEN

## (undated) DEVICE — BINDER ABDOMINAL 46-62 IN

## (undated) DEVICE — PMI DISPOSABLE PUNCTURE CLOSURE DEVICE / SUTURE GRASPER: Brand: PMI

## (undated) DEVICE — SHEATH URETERAL ACCESS 12/14FR 35CM PROXIS